# Patient Record
Sex: FEMALE | Race: BLACK OR AFRICAN AMERICAN | NOT HISPANIC OR LATINO | Employment: OTHER | URBAN - NONMETROPOLITAN AREA
[De-identification: names, ages, dates, MRNs, and addresses within clinical notes are randomized per-mention and may not be internally consistent; named-entity substitution may affect disease eponyms.]

---

## 2017-01-04 PROBLEM — R12 HEART BURN: Status: ACTIVE | Noted: 2017-01-04

## 2017-01-04 PROBLEM — J44.9 CHRONIC OBSTRUCTIVE PULMONARY DISEASE (HCC): Status: ACTIVE | Noted: 2017-01-04

## 2017-01-04 PROBLEM — R06.2 WHEEZING: Status: ACTIVE | Noted: 2017-01-04

## 2017-01-12 ENCOUNTER — OFFICE VISIT (OUTPATIENT)
Dept: GASTROENTEROLOGY | Facility: CLINIC | Age: 57
End: 2017-01-12

## 2017-01-12 VITALS
DIASTOLIC BLOOD PRESSURE: 82 MMHG | HEART RATE: 61 BPM | WEIGHT: 118.5 LBS | SYSTOLIC BLOOD PRESSURE: 124 MMHG | HEIGHT: 65 IN | BODY MASS INDEX: 19.74 KG/M2

## 2017-01-12 DIAGNOSIS — R13.10 DYSPHAGIA, UNSPECIFIED TYPE: ICD-10-CM

## 2017-01-12 DIAGNOSIS — K22.2 STRICTURE ESOPHAGUS: Primary | ICD-10-CM

## 2017-01-12 PROCEDURE — 99213 OFFICE O/P EST LOW 20 MIN: CPT | Performed by: INTERNAL MEDICINE

## 2017-01-12 NOTE — MR AVS SNAPSHOT
Maddison Sheridan   1/12/2017 9:15 AM   Office Visit    Dept Phone:  [phone]   Encounter #:  [id number]    Provider:  Babar Connell MD   Department:  [facility] GASTROENTEROLOGY                Your Full Care Plan              Your Updated Medication List          This list is accurate as of: 1/12/17 10:27 AM.  Always use your most recent med list.                albuterol 108 (90 BASE) MCG/ACT inhaler   Commonly known as:  PROVENTIL HFA;VENTOLIN HFA   Inhale 2 puffs Every 4 (Four) Hours As Needed for wheezing.       BOOST liquid   Take 1 can by mouth 4 (four) times a day.       buPROPion  MG 24 hr tablet   Commonly known as:  WELLBUTRIN XL   Take 1 tablet by mouth Daily.       docusate sodium 100 MG capsule   Commonly known as:  COLACE   Take 1 capsule by mouth 2 (Two) Times a Day.       HYDROcodone-acetaminophen  MG per tablet   Commonly known as:  NORCO   Take 1 tablet by mouth 2 (Two) Times a Day As Needed for moderate pain (4-6).       hydrOXYzine 25 MG capsule   Commonly known as:  VISTARIL       loperamide 2 MG capsule   Commonly known as:  IMODIUM   Take 1 capsule by mouth 3 (three) times a day.       metoclopramide 10 MG tablet   Commonly known as:  REGLAN   Take 1 tablet by mouth 4 (Four) Times a Day.       NEXIUM 24HR 20 MG capsule   Generic drug:  esomeprazole   Take 1 capsule by mouth Daily.       ondansetron ODT 4 MG disintegrating tablet   Commonly known as:  ZOFRAN-ODT   Take 1 tablet by mouth Every 6 (Six) Hours As Needed for nausea or vomiting.       sucralfate 1 GM/10ML suspension   Commonly known as:  CARAFATE   Take 10 mL by mouth 4 (Four) Times a Day.               We Performed the Following     Case request       You Were Diagnosed With        Codes Comments    Stricture esophagus    -  Primary ICD-10-CM: K22.2  ICD-9-CM: 530.3     Dysphagia, unspecified type     ICD-10-CM: R13.10  ICD-9-CM: 787.20       Instructions     None       Patient Instructions History      Upcoming Appointments     Visit Type Date Time Department    OFFICE VISIT 2017  9:15 AM [facility] GASTROENT  [facility]    OFFICE VISIT 2017  3:30 PM [facility] FM RESIDENT [facility]    NEW PATIENT 2017 10:00 AM [facility] PAIN MNGT [facility]    OFFICE VISIT 3/2/2017 10:00 AM [facility] GASTROENT  [facility]    [facility]    LAB 2017  2:00 PM  [facility] OP INFUSION      MyChart Signup     [facility] allows you to send messages to your doctor, view your test results, renew your prescriptions, schedule appointments, and more. To sign up, go to [employer] and click on the Sign Up Now link in the New User? box. Enter your [employer] Activation Code exactly as it appears below along with the last four digits of your Social Security Number and your Date of Birth () to complete the sign-up process. If you do not sign up before the expiration date, you must request a new code.    Shoeb[employer] Activation Code: [email or web address]  Expires: 2017 10:26 AM    If you have questions, you can email [email or web address] or call [phone] to talk to our [employer] staff. Remember, [employer] is NOT to be used for urgent needs. For medical emergencies, dial 911.               Other Info from Your Visit           Your Appointments     2017  3:30 PM CST   Office Visit with Tiffany Jimenez MD   [facility] FAMILY MEDICINE (--)    [address] [place] [address] [phone]           Arrive 15 minutes prior to appointment.            2017 10:00 AM CST   New Patient with Luiz Baxter MD   [facility] PAIN MANAGEMENT (--)    [address]  [address] [place] [address] [phone]           Bring all previous medical records and films, along with current medications and insurance information.            Mar 02, 2017 10:00 AM CST   Office Visit with Babar Connell MD   [facility]  "[facility] GASTROENTEROLOGY (--)    [address] [place] [address] [address]  [address]  [facility] [address] [phone]           Arrive 15 minutes prior to appointment.            Apr 13, 2017  2:00 PM CDT   LAB with NURSE TANMAY ALEXANDER   [facility] OP INFUSION ([place])    [address] [place] [id number] [phone]            Apr 13, 2017  2:30 PM CDT   Follow Up with Camilo Coleman MD   [facility] HEMATOLOGY AND ONCOLOGY ([place])    [address] [place] [address] [phone]           Arrive 15 minutes prior to appointment.              Other Notes About Your Plan     Risk Score 4        Allergies     Penicillins  Hives      Reason for Visit     Nausea 4 week follow up    Vomiting           Vital Signs     Blood Pressure Pulse Height Weight Last Menstrual Period Body Mass Index    124/82 (BP Location: Left arm, Patient Position: Sitting, Cuff Size: Adult) 61 65\" (165.1 cm) 118 lb 8 oz (53.8 kg) 03/17/1989 (Within Months) 19.72 kg/m2    Smoking Status                   Former Smoker           Problems and Diagnoses Noted     Difficulty swallowing    Narrowing of esophagus        "

## 2017-01-12 NOTE — LETTER
January 12, 2017     Moses Howard MD  [facility] [address] [place] [address]    Patient: Maddison Sheridan   YOB: 1960   Date of Visit: 1/12/2017       Dear Dr. Anita MD:    Thank you for referring Maddison Sheridan to me for evaluation. Below is a copy of my consult note.    If you have questions, please do not hesitate to call me. I look forward to following Maddison along with you.         Sincerely,        Babar Connell MD        CC: MD Paul Johnson MD Farhan Ahmed, MD Lilly Maryam Yusufi, MD Benjamin Jacob Jarrett, MD    [facility]      Chief Complaint:   Chief Complaint   Patient presents with   • Nausea     4 week follow up   • Vomiting       Subjective     HPI:   Patient with history of esophageal cancer squamous cell.  Patient had chemotherapy and radiation.  Patient has been having dysphagia and has stricturing of the distal esophagus causing difficulty with eating.    Plan; we'll schedule patient for EGD with dilatation.    Past Medical History:   Past Medical History   Diagnosis Date   • Abnormal weight loss    • Atrophic vaginitis    • Degeneration of lumbar intervertebral disc    • Dysphagia      pharyngoesophageal phase      • Encounter for surgical aftercare following surgery of digestive system      [place] Michael (thoracic esophagectomy, lymphadenectomly, Bronchoscopy, Placement on Q. 3/1/16      • Essential hypertension    • History of esophagogastroduodenoscopy      Normal hypopharynx.A tumor was found in the middle third of the esophagus.Mul.biopsies taken.Normal GE junction.Mild nonerosive gastritis in antrum.Biopsies taken.Normal pylorus and duodenum. 09/24/2015       • History of hysteroscopy      Hysteroscopy, dilation and curettage, and repair of vaginal laceration. 08/03/2013       • History of substance abuse    • Lumbosacral strain    • Myopia    • Presbyopia    • Primary malignant neoplasm of thoracic part of  esophagus      Squamous cell   • Tobacco dependence      1 ppd       Family History:  Family History   Problem Relation Age of Onset   • Ovarian cancer Mother    • Alzheimer's disease Father    • Osteoporosis Other    • Heart disease Other    • Diabetes Other    • Cancer Other      Colorectal-Parent   • Asthma Other        Social History:   reports that she has quit smoking. She does not have any smokeless tobacco history on file. She reports that she does not drink alcohol.    Medications:   Current Outpatient Prescriptions   Medication Sig Dispense Refill   • albuterol (PROVENTIL HFA;VENTOLIN HFA) 108 (90 BASE) MCG/ACT inhaler Inhale 2 puffs Every 4 (Four) Hours As Needed for wheezing. 3.7 g 1   • buPROPion XL (WELLBUTRIN XL) 150 MG 24 hr tablet Take 1 tablet by mouth Daily. 30 tablet 5   • docusate sodium (COLACE) 100 MG capsule Take 1 capsule by mouth 2 (Two) Times a Day. 60 capsule 3   • HYDROcodone-acetaminophen (NORCO)  MG per tablet Take 1 tablet by mouth 2 (Two) Times a Day As Needed for moderate pain (4-6). 60 tablet 0   • hydrOXYzine (VISTARIL) 25 MG capsule Take 25 mg by mouth Daily.     • loperamide (IMODIUM) 2 MG capsule Take 1 capsule by mouth 3 (three) times a day. 180 capsule 1   • metoclopramide (REGLAN) 10 MG tablet Take 1 tablet by mouth 4 (Four) Times a Day. 336 tablet 0   • NEXIUM 24HR 20 MG capsule Take 1 capsule by mouth Daily. 30 capsule 5   • Nutritional Supplements (BOOST) liquid Take 1 can by mouth 4 (four) times a day. 30 can 1   • ondansetron ODT (ZOFRAN-ODT) 4 MG disintegrating tablet Take 1 tablet by mouth Every 6 (Six) Hours As Needed for nausea or vomiting. 60 tablet 1   • sucralfate (CARAFATE) 1 GM/10ML suspension Take 10 mL by mouth 4 (Four) Times a Day. 4 g 5     No current facility-administered medications for this visit.        Allergies:  Penicillins    ROS:    Review of Systems   HENT: Positive for trouble swallowing. Negative for congestion and sore throat.   "  Respiratory: Negative for apnea, cough, choking, chest tightness, shortness of breath, wheezing and stridor.    Cardiovascular: Negative for chest pain, palpitations and leg swelling.   Gastrointestinal: Negative for abdominal distention, abdominal pain, anal bleeding, blood in stool, constipation, diarrhea, nausea, rectal pain and vomiting.   Skin: Negative for color change, pallor, rash and wound.   Neurological: Negative for dizziness, syncope, weakness and headaches.   Psychiatric/Behavioral: Negative for agitation, behavioral problems, confusion and decreased concentration.     Objective     Blood pressure 124/82, pulse 61, height 65\" (165.1 cm), weight 118 lb 8 oz (53.8 kg), last menstrual period 03/17/1989, not currently breastfeeding.    Physical Exam   Constitutional: She is oriented to person, place, and time. She appears well-developed and well-nourished. No distress.   HENT:   Head: Normocephalic and atraumatic.   Cardiovascular: Normal rate, regular rhythm, normal heart sounds and intact distal pulses.  Exam reveals no gallop and no friction rub.    No murmur heard.  Pulmonary/Chest: Breath sounds normal. No respiratory distress. She has no wheezes. She has no rales. She exhibits no tenderness.   Abdominal: Soft. Bowel sounds are normal. She exhibits no distension and no mass. There is no tenderness. There is no rebound and no guarding. No hernia.   Musculoskeletal: Normal range of motion. She exhibits no edema.   Neurological: She is alert and oriented to person, place, and time.   Skin: Skin is warm and dry. No rash noted. She is not diaphoretic. No erythema. No pallor.   Psychiatric: She has a normal mood and affect. Her behavior is normal. Judgment and thought content normal.        Assessment/Plan   Maddison was seen today for nausea and vomiting.    Diagnoses and all orders for this visit:    Stricture esophagus  -     Case request    Dysphagia, unspecified type  -     Case " request        ESOPHAGOGASTRODUODENOSCOPY (N/A)     Diagnosis Plan   1. Stricture esophagus  Case request   2. Dysphagia, unspecified type  Case request       Anticipated Surgical Procedure:  No orders of the defined types were placed in this encounter.      The risks, benefits, and alternatives of this procedure have been discussed with the patient or the responsible party- the patient understands and agrees to proceed.

## 2017-01-12 NOTE — PROGRESS NOTES
[facility]      Chief Complaint:   Chief Complaint   Patient presents with   • Nausea     4 week follow up   • Vomiting       Subjective     HPI:   Patient with history of esophageal cancer squamous cell.  Patient had chemotherapy and radiation.  Patient has been having dysphagia and has stricturing of the distal esophagus causing difficulty with eating.    Plan; we'll schedule patient for EGD with dilatation.    Past Medical History:   Past Medical History   Diagnosis Date   • Abnormal weight loss    • Atrophic vaginitis    • Degeneration of lumbar intervertebral disc    • Dysphagia      pharyngoesophageal phase      • Encounter for surgical aftercare following surgery of digestive system      [place] Michael (thoracic esophagectomy, lymphadenectomly, Bronchoscopy, Placement on Q. 3/1/16      • Essential hypertension    • History of esophagogastroduodenoscopy      Normal hypopharynx.A tumor was found in the middle third of the esophagus.Mul.biopsies taken.Normal GE junction.Mild nonerosive gastritis in antrum.Biopsies taken.Normal pylorus and duodenum. 09/24/2015       • History of hysteroscopy      Hysteroscopy, dilation and curettage, and repair of vaginal laceration. 08/03/2013       • History of substance abuse    • Lumbosacral strain    • Myopia    • Presbyopia    • Primary malignant neoplasm of thoracic part of esophagus      Squamous cell   • Tobacco dependence      1 ppd       Family History:  Family History   Problem Relation Age of Onset   • Ovarian cancer Mother    • Alzheimer's disease Father    • Osteoporosis Other    • Heart disease Other    • Diabetes Other    • Cancer Other      Colorectal-Parent   • Asthma Other        Social History:   reports that she has quit smoking. She does not have any smokeless tobacco history on file. She reports that she does not drink alcohol.    Medications:   Current Outpatient Prescriptions   Medication Sig Dispense Refill   • albuterol (PROVENTIL  HFA;VENTOLIN HFA) 108 (90 BASE) MCG/ACT inhaler Inhale 2 puffs Every 4 (Four) Hours As Needed for wheezing. 3.7 g 1   • buPROPion XL (WELLBUTRIN XL) 150 MG 24 hr tablet Take 1 tablet by mouth Daily. 30 tablet 5   • docusate sodium (COLACE) 100 MG capsule Take 1 capsule by mouth 2 (Two) Times a Day. 60 capsule 3   • HYDROcodone-acetaminophen (NORCO)  MG per tablet Take 1 tablet by mouth 2 (Two) Times a Day As Needed for moderate pain (4-6). 60 tablet 0   • hydrOXYzine (VISTARIL) 25 MG capsule Take 25 mg by mouth Daily.     • loperamide (IMODIUM) 2 MG capsule Take 1 capsule by mouth 3 (three) times a day. 180 capsule 1   • metoclopramide (REGLAN) 10 MG tablet Take 1 tablet by mouth 4 (Four) Times a Day. 336 tablet 0   • NEXIUM 24HR 20 MG capsule Take 1 capsule by mouth Daily. 30 capsule 5   • Nutritional Supplements (BOOST) liquid Take 1 can by mouth 4 (four) times a day. 30 can 1   • ondansetron ODT (ZOFRAN-ODT) 4 MG disintegrating tablet Take 1 tablet by mouth Every 6 (Six) Hours As Needed for nausea or vomiting. 60 tablet 1   • sucralfate (CARAFATE) 1 GM/10ML suspension Take 10 mL by mouth 4 (Four) Times a Day. 4 g 5     No current facility-administered medications for this visit.        Allergies:  Penicillins    ROS:    Review of Systems   HENT: Positive for trouble swallowing. Negative for congestion and sore throat.    Respiratory: Negative for apnea, cough, choking, chest tightness, shortness of breath, wheezing and stridor.    Cardiovascular: Negative for chest pain, palpitations and leg swelling.   Gastrointestinal: Negative for abdominal distention, abdominal pain, anal bleeding, blood in stool, constipation, diarrhea, nausea, rectal pain and vomiting.   Skin: Negative for color change, pallor, rash and wound.   Neurological: Negative for dizziness, syncope, weakness and headaches.   Psychiatric/Behavioral: Negative for agitation, behavioral problems, confusion and decreased concentration.  "    Objective     Blood pressure 124/82, pulse 61, height 65\" (165.1 cm), weight 118 lb 8 oz (53.8 kg), last menstrual period 03/17/1989, not currently breastfeeding.    Physical Exam   Constitutional: She is oriented to person, place, and time. She appears well-developed and well-nourished. No distress.   HENT:   Head: Normocephalic and atraumatic.   Cardiovascular: Normal rate, regular rhythm, normal heart sounds and intact distal pulses.  Exam reveals no gallop and no friction rub.    No murmur heard.  Pulmonary/Chest: Breath sounds normal. No respiratory distress. She has no wheezes. She has no rales. She exhibits no tenderness.   Abdominal: Soft. Bowel sounds are normal. She exhibits no distension and no mass. There is no tenderness. There is no rebound and no guarding. No hernia.   Musculoskeletal: Normal range of motion. She exhibits no edema.   Neurological: She is alert and oriented to person, place, and time.   Skin: Skin is warm and dry. No rash noted. She is not diaphoretic. No erythema. No pallor.   Psychiatric: She has a normal mood and affect. Her behavior is normal. Judgment and thought content normal.        Assessment/Plan   Maddison was seen today for nausea and vomiting.    Diagnoses and all orders for this visit:    Stricture esophagus  -     Case request    Dysphagia, unspecified type  -     Case request        ESOPHAGOGASTRODUODENOSCOPY (N/A)     Diagnosis Plan   1. Stricture esophagus  Case request   2. Dysphagia, unspecified type  Case request       Anticipated Surgical Procedure:  No orders of the defined types were placed in this encounter.      The risks, benefits, and alternatives of this procedure have been discussed with the patient or the responsible party- the patient understands and agrees to proceed.                                                                "

## 2017-01-12 NOTE — LETTER
January 12, 2017     Moses Howard MD  [facility] [address] [place] [address]    Patient: Maddison Sheridan   YOB: 1960   Date of Visit: 1/12/2017       Dear Dr. Anita MD:    Thank you for referring Maddison Sheridan to me for evaluation. Below are the relevant portions of my assessment and plan of care.      Maddison was seen today for nausea and vomiting.    Diagnoses and all orders for this visit:    Stricture esophagus  -     Case request    Dysphagia, unspecified type  -     Case request        ESOPHAGOGASTRODUODENOSCOPY (N/A)     Diagnosis Plan   1. Stricture esophagus  Case request   2. Dysphagia, unspecified type  Case request       Anticipated Surgical Procedure:  No orders of the defined types were placed in this encounter.      The risks, benefits, and alternatives of this procedure have been discussed with the patient or the responsible party- the patient understands and agrees to proceed.                                                                              If you have questions, please do not hesitate to call me. I look forward to following Maddison along with you.         Sincerely,        Babar Connell MD        CC: No Recipients

## 2017-01-20 ENCOUNTER — OFFICE VISIT (OUTPATIENT)
Dept: FAMILY MEDICINE CLINIC | Facility: CLINIC | Age: 57
End: 2017-01-20

## 2017-01-20 VITALS
DIASTOLIC BLOOD PRESSURE: 80 MMHG | HEIGHT: 65 IN | OXYGEN SATURATION: 98 % | HEART RATE: 70 BPM | WEIGHT: 114.2 LBS | BODY MASS INDEX: 19.03 KG/M2 | SYSTOLIC BLOOD PRESSURE: 138 MMHG

## 2017-01-20 DIAGNOSIS — G89.28 OTHER CHRONIC POSTPROCEDURAL PAIN: ICD-10-CM

## 2017-01-20 PROCEDURE — 99213 OFFICE O/P EST LOW 20 MIN: CPT | Performed by: FAMILY MEDICINE

## 2017-01-20 RX ORDER — HYDROCODONE BITARTRATE AND ACETAMINOPHEN 10; 325 MG/1; MG/1
1 TABLET ORAL 3 TIMES DAILY PRN
Qty: 90 TABLET | Refills: 0 | Status: SHIPPED | OUTPATIENT
Start: 2017-01-20 | End: 2017-02-17 | Stop reason: SDUPTHER

## 2017-01-20 NOTE — PROGRESS NOTES
Subjective:     Maddison Sheridan is a 56 y.o. female who presents for Chronic pain secondary from esophageal cancer s/p surgery. Stomach has been moved up to accommodate shortened esophagus.   Patient is to see Dr. Connell soon for esophageal stretching.  She has no side effects of medication at this time.  Patient does state that she uses one in the morning and 1 at night however pain returns approximately in early afternoon and continues until her nighttime dose.  Her nighttime dose has helped her to sleep.  She was requesting her Norco be increased to 3 times a day    History:   Duration of the patient's pain: a few year(s)   Legal issues? no   Current controlled medication and doses: Norco 10/325 BID   Medications that have failed: Vistaril, Benadryl, Ibuprofen, Tylenol   Side effects of current medications: constipation   Satisfaction with treatment: yes    Functional Status:   Able to feed self: yes   Able to bathe self: yes   Able to use the toilet independently: yes   Able to dress self: yes   Able to get up from bed or chair without assistance: yes    Adverse Events:   Constipation: yes   Lethargy: yes   Hypogonadism: no    Aberrant Behavior:   Over dose: no   Run out of medications early: no   Last UDS consistent: yes    Analgesia:   Pain scale prior to meds: 9    Pain scale after meds started: 6   Taking medications as prescribed: yes     The following portions of the patient's history were reviewed and updated as appropriate: allergies, current medications, past family history, past medical history, past social history, past surgical history and problem list.    Preventative:  Over the past 2 weeks, have you felt down, depressed, or hopeless?Yes   Over the past 2 weeks, have you felt little interest or pleasure in doing things?Yes  Clinical depression screening refused by patient.Yes     On osteoporosis therapy?No     Past Medical Hx:  Past Medical History   Diagnosis Date   • Abnormal weight  loss    • Atrophic vaginitis    • Degeneration of lumbar intervertebral disc    • Dysphagia      pharyngoesophageal phase      • Encounter for surgical aftercare following surgery of digestive system      Wal[place] Michael (thoracic esophagectomy, lymphadenectomly, Bronchoscopy, Placement on Q. 3/1/16      • Essential hypertension    • History of esophagogastroduodenoscopy      Normal hypopharynx.A tumor was found in the middle third of the esophagus.Mul.biopsies taken.Normal GE junction.Mild nonerosive gastritis in antrum.Biopsies taken.Normal pylorus and duodenum. 09/24/2015       • History of hysteroscopy      Hysteroscopy, dilation and curettage, and repair of vaginal laceration. 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 Esophageal carcinoma.   • Lumbar disc surgery     • Esophagus surgery       Thoracic esophagectomy (Walter Michael type).Thoracic lymphadenectomy.Fiberoptic bronchoscopy.Placement of On Q pain pump.Jejunostomy tube placement.Pyloromyotomy. 03/01/2016          Health Maintenance:  Health Maintenance   Topic Date Due   • HEPATITIS C SCREENING  08/24/2016   • MAMMOGRAM  08/24/2018   • PAP SMEAR  12/23/2019   • TDAP/TD VACCINES (2 - Td) 01/01/2023   • COLONOSCOPY  08/24/2026   • INFLUENZA VACCINE  Addressed       Current Meds:    Current Outpatient Prescriptions:   •  albuterol (PROVENTIL HFA;VENTOLIN HFA) 108 (90 BASE) MCG/ACT inhaler, Inhale 2 puffs Every 4 (Four) Hours As Needed for wheezing., Disp: 3.7 g, Rfl: 1  •  buPROPion XL (WELLBUTRIN XL) 150 MG 24 hr tablet, Take 1 tablet by mouth Daily., Disp: 30 tablet, Rfl: 5  •  docusate sodium  (COLACE) 100 MG capsule, Take 1 capsule by mouth 2 (Two) Times a Day., Disp: 60 capsule, Rfl: 3  •  HYDROcodone-acetaminophen (NORCO)  MG per tablet, Take 1 tablet by mouth 2 (Two) Times a Day As Needed for moderate pain (4-6)., Disp: 60 tablet, Rfl: 0  •  hydrOXYzine (VISTARIL) 25 MG capsule, Take 25 mg by mouth Daily., Disp: , Rfl:   •  loperamide (IMODIUM) 2 MG capsule, Take 1 capsule by mouth 3 (three) times a day., Disp: 180 capsule, Rfl: 1  •  metoclopramide (REGLAN) 10 MG tablet, Take 1 tablet by mouth 4 (Four) Times a Day., Disp: 336 tablet, Rfl: 0  •  NEXIUM 24HR 20 MG capsule, Take 1 capsule by mouth Daily., Disp: 30 capsule, Rfl: 5  •  Nutritional Supplements (BOOST) liquid, Take 1 can by mouth 4 (four) times a day., Disp: 30 can, Rfl: 1  •  ondansetron ODT (ZOFRAN-ODT) 4 MG disintegrating tablet, Take 1 tablet by mouth Every 6 (Six) Hours As Needed for nausea or vomiting., Disp: 60 tablet, Rfl: 1  •  sucralfate (CARAFATE) 1 GM/10ML suspension, Take 10 mL by mouth 4 (Four) Times a Day., Disp: 4 g, Rfl: 5    Allergies:  Penicillins    Family Hx:  Family History   Problem Relation Age of Onset   • Ovarian cancer Mother    • Alzheimer's disease Father    • Osteoporosis Other    • Heart disease Other    • Diabetes Other    • Cancer Other      Colorectal-Parent   • Asthma Other         Social History:  Social History     Social History   • Marital status: Single     Spouse name: N/A   • Number of children: N/A   • Years of education: N/A     Occupational History   • Not on file.     Social History Main Topics   • Smoking status: Former Smoker   • Smokeless tobacco: Not on file   • Alcohol use No   • Drug use: Not on file   • Sexual activity: Not on file     Other Topics Concern   • Not on file     Social History Narrative       Review of Systems  Review of Systems   Constitutional: Negative.  Negative for fatigue and fever.   HENT: Negative.  Negative for ear pain and sore throat.    Eyes: Negative.   "Negative for pain and visual disturbance.   Respiratory: Negative for cough and shortness of breath.    Cardiovascular: Negative.  Negative for chest pain and palpitations.   Gastrointestinal: Negative for abdominal pain and nausea.   Endocrine: Negative.    Genitourinary: Negative for dysuria.   Musculoskeletal: Negative for arthralgias.        Pain secondary to esophageal cancer. Difficulty sleeping due to Gerd.    Skin: Negative for rash.   Allergic/Immunologic: Negative.    Neurological: Negative for dizziness, weakness and headaches.   Psychiatric/Behavioral: Negative for sleep disturbance.       Objective:     Visit Vitals   • /80 (BP Location: Left arm, Patient Position: Sitting, Cuff Size: Adult)   • Pulse 70   • Ht 65\" (165.1 cm)   • Wt 114 lb 3.2 oz (51.8 kg)   • LMP 03/17/1989 (Within Months)   • SpO2 98%   • BMI 19 kg/m2     Physical exam   General:  alert, appears stated age, cooperative and no distress   Oropharynx: lips, mucosa, and tongue normal; teeth and gums normal    Eyes:  conjunctivae/corneas clear. PERRL, EOM's intact. Fundi benign.    Ears:  normal TM's and external ear canals both ears   Neck: no adenopathy, no carotid bruit, no JVD, supple, symmetrical, trachea midline and thyroid not enlarged, symmetric, no tenderness/mass/nodules   Thyroid:  no palpable nodule   Lung: clear to auscultation bilaterally   Heart:  regular rate and rhythm, S1, S2 normal, no murmur, click, rub or gallop   Abdomen: soft, non-tender; bowel sounds normal; no masses,  no organomegaly   Extremities: extremities normal, atraumatic, no cyanosis or edema   Skin: warm and dry, no hyperpigmentation, vitiligo, or suspicious lesions   Pulses: 2+ and symmetric   Neuro: normal without focal findings, mental status, speech normal, alert and oriented x3 and reflexes normal and symmetric       Lab Review  none     Assessment:     1. Other chronic postprocedural pain         Plan:     As part of this patient's treatment " plan, I am prescribing controlled substances. The patient has been made aware of appropriate use of such medications, including potential risk of somnolence, limited ability to drive and /or work safely, and potential for dependence or overdose. It has also been made clear that these medications are for use by this patient only, without concomitant use of alcohol or other substances unless prescribed.    Patient has completed prescribing agreement detailing terms of continued prescribing of controlled substances, including monitoring ANNETTE reports, urine drug screening, and pill counts if necessary. The patient is aware that inappropriate use will result in cessation of prescribing such medications.    History and physical exam exhibit continued safe and appropriate use of controlled substances.    1.  Controlled substance abuse agreement discussed and copy in record: yes, prior  2.  Discussed:   Risk of addiction: yes   Specific risk of medications: yes   Side effects of medications: yes   Reasonable expectations of the medication: yes  3.  Treatment Objectives:   Discussed management of constipation: yes   Discussed management of other side effects: yes   4.  eKASPER:     ANNETTE report has been reviewed by: Tiffany Jimenez MD on 01/20/17.  The report was scanned into the patient's chart.   Date of last ANNETTE:  1/20/2017 appropriate   Annette # [id number]   UDS Today again  5.  Results and date of last drug screen:appropriate  6.  Follow up plan:    1 month   Recheck if not improving: yes   Continue on current medications as directed: yes   7.  Adjustments:   We'll increase medication 3 times a day      RISK SCORE: 4          This document has been electronically signed by Tiffany Jimenez MD on January 25, 2017 9:57 AM

## 2017-01-20 NOTE — MR AVS SNAPSHOT
Maddison Sheridan   1/20/2017 3:30 PM   Office Visit    Dept Phone:  [phone]   Encounter #:  [id number]    Provider:  Tiffany Jimenez MD   Department:  [facility]                Your Full Care Plan              Today's Medication Changes          These changes are accurate as of: 1/20/17  4:31 PM.  If you have any questions, ask your nurse or doctor.               Medication(s)that have changed:     HYDROcodone-acetaminophen  MG per tablet   Commonly known as:  NORCO   Take 1 tablet by mouth 3 (Three) Times a Day As Needed for moderate pain (4-6).   What changed:  when to take this   Changed by:  Tiffany Jimenez MD            Where to Get Your Medications      You can get these medications from any pharmacy     Bring a paper prescription for each of these medications     HYDROcodone-acetaminophen  MG per tablet                  Your Updated Medication List          This list is accurate as of: 1/20/17  4:31 PM.  Always use your most recent med list.                albuterol 108 (90 BASE) MCG/ACT inhaler   Commonly known as:  PROVENTIL HFA;VENTOLIN HFA   Inhale 2 puffs Every 4 (Four) Hours As Needed for wheezing.       BOOST liquid   Take 1 can by mouth 4 (four) times a day.       buPROPion  MG 24 hr tablet   Commonly known as:  WELLBUTRIN XL   Take 1 tablet by mouth Daily.       docusate sodium 100 MG capsule   Commonly known as:  COLACE   Take 1 capsule by mouth 2 (Two) Times a Day.       HYDROcodone-acetaminophen  MG per tablet   Commonly known as:  NORCO   Take 1 tablet by mouth 3 (Three) Times a Day As Needed for moderate pain (4-6).       hydrOXYzine 25 MG capsule   Commonly known as:  VISTARIL       loperamide 2 MG capsule   Commonly known as:  IMODIUM   Take 1 capsule by mouth 3 (three) times a day.       metoclopramide 10 MG tablet   Commonly known as:  REGLAN   Take 1 tablet by mouth 4 (Four) Times a Day.       NEXIUM 24HR 20 MG capsule   Generic drug:  esomeprazole   Take 1 capsule by mouth Daily.       ondansetron ODT 4 MG disintegrating tablet   Commonly known as:  ZOFRAN-ODT   Take 1 tablet by mouth Every 6 (Six) Hours As Needed for nausea or vomiting.       sucralfate 1 GM/10ML suspension   Commonly known as:  CARAFATE   Take 10 mL by mouth 4 (Four) Times a Day.               We Performed the Following     Pain Management Profile (13 Drugs) Urine       You Were Diagnosed With        Codes Comments    Other chronic postprocedural pain     ICD-10-CM: G89.28  ICD-9-CM: 338.28       Instructions     None    Patient Instructions History      Upcoming Appointments     Visit Type Date Time Department    OFFICE VISIT 2017  3:30 PM W  RESIDENT [facility]    NEW PATIENT 2017 10:00 AM [facility] PAIN MNGT [facility]    OFFICE VISIT 2017  3:45 PM [facility] FM RESIDENT [facility]    OFFICE VISIT 3/2/2017 10:00 AM [facility] GASTROENT  [facility]    FOLLOW UP 2017  2:30 PM [facility] ONC [place]    LAB 2017  2:00 PM [facility] OP INFUSION      MyChart Signup     [facility] [employer] allows you to send messages to your doctor, view your test results, renew your prescriptions, schedule appointments, and more. To sign up, go to [employer] and click on the Sign Up Now link in the New User? box. Enter your [employer] Activation Code exactly as it appears below along with the last four digits of your Social Security Number and your Date of Birth () to complete the sign-up process. If you do not sign up before the expiration date, you must request a new code.    [employer] Activation Code: [email or web address]  Expires: 2017 10:26 AM    If you have questions, you can email [email or web address] or call [phone] to talk to our [employer] staff. Remember, [employer] is NOT to be used for urgent needs. For medical emergencies, dial 911.               Other Info from Your Visit           Your Appointments     2017 10:00 AM CST   New  "Patient with Luiz Baxter MD   [facility] PAIN MANAGEMENT (--)    [address]  [place] [address] [facility] [address] [phone]           Bring all previous medical records and films, along with current medications and insurance information.            Feb 17, 2017  3:45 PM CST   Office Visit with Tiffany Jimenez MD   [facility] FAMILY MEDICINE (--)    [address]   [place] [address] [phone]           Arrive 15 minutes prior to appointment.            Mar 02, 2017 10:00 AM CST   Office Visit with Babar Connell MD   [facility] GASTROENTEROLOGY (--)    [address]  [address] [place] [address] [facility] [address] [phone]           Arrive 15 minutes prior to appointment.            Apr 13, 2017  2:00 PM CDT   LAB with NURSE TANMAY ALEXANDER   [facility] OP INFUSION ([place])    [address] [address] [phone]            Apr 13, 2017  2:30 PM CDT   Follow Up with Camilo Coleman MD   [facility] HEMATOLOGY AND ONCOLOGY ([address] [address] [phone]           Arrive 15 minutes prior to appointment.              Other Notes About Your Plan     Risk Score 4        Allergies     Penicillins  Hives      Reason for Visit     Pain     Follow-up           Vital Signs     Blood Pressure Pulse Height Weight    138/80 (BP Location: Left arm, Patient Position: Sitting, Cuff Size: Adult) 70 65\" (165.1 cm) 114 lb 3.2 oz (51.8 kg)    Last Menstrual Period Oxygen Saturation Body Mass Index Smoking Status    03/17/1989 (Within Months) 98% 19 kg/m2 Former Smoker      Problems and Diagnoses Noted     Chronic pain        "

## 2017-02-03 ENCOUNTER — HOSPITAL ENCOUNTER (OUTPATIENT)
Facility: HOSPITAL | Age: 57
Setting detail: HOSPITAL OUTPATIENT SURGERY
Discharge: HOME | End: 2017-02-03
Attending: INTERNAL MEDICINE | Admitting: INTERNAL MEDICINE

## 2017-02-03 ENCOUNTER — ANESTHESIA EVENT (OUTPATIENT)
Dept: GASTROENTEROLOGY | Facility: HOSPITAL | Age: 57
End: 2017-02-03

## 2017-02-03 ENCOUNTER — ANESTHESIA (OUTPATIENT)
Dept: GASTROENTEROLOGY | Facility: HOSPITAL | Age: 57
End: 2017-02-03

## 2017-02-03 VITALS
SYSTOLIC BLOOD PRESSURE: 137 MMHG | WEIGHT: 114.2 LBS | RESPIRATION RATE: 18 BRPM | DIASTOLIC BLOOD PRESSURE: 87 MMHG | BODY MASS INDEX: 19.03 KG/M2 | OXYGEN SATURATION: 100 % | HEART RATE: 87 BPM | HEIGHT: 65 IN | TEMPERATURE: 96.9 F

## 2017-02-03 DIAGNOSIS — R13.10 DYSPHAGIA, UNSPECIFIED TYPE: ICD-10-CM

## 2017-02-03 DIAGNOSIS — K22.2 STRICTURE ESOPHAGUS: ICD-10-CM

## 2017-02-03 PROCEDURE — 43239 EGD BIOPSY SINGLE/MULTIPLE: CPT | Performed by: INTERNAL MEDICINE

## 2017-02-03 PROCEDURE — 25010000002 MIDAZOLAM PER 1 MG: Performed by: NURSE ANESTHETIST, CERTIFIED REGISTERED

## 2017-02-03 PROCEDURE — 25010000002 FENTANYL CITRATE (PF) 100 MCG/2ML SOLUTION: Performed by: NURSE ANESTHETIST, CERTIFIED REGISTERED

## 2017-02-03 PROCEDURE — 88342 IMHCHEM/IMCYTCHM 1ST ANTB: CPT | Performed by: PATHOLOGY

## 2017-02-03 PROCEDURE — 88342 IMHCHEM/IMCYTCHM 1ST ANTB: CPT | Performed by: INTERNAL MEDICINE

## 2017-02-03 PROCEDURE — 88305 TISSUE EXAM BY PATHOLOGIST: CPT | Performed by: PATHOLOGY

## 2017-02-03 PROCEDURE — 43248 EGD GUIDE WIRE INSERTION: CPT | Performed by: INTERNAL MEDICINE

## 2017-02-03 PROCEDURE — 88305 TISSUE EXAM BY PATHOLOGIST: CPT | Performed by: INTERNAL MEDICINE

## 2017-02-03 RX ORDER — LIDOCAINE HYDROCHLORIDE 10 MG/ML
INJECTION, SOLUTION INFILTRATION; PERINEURAL AS NEEDED
Status: DISCONTINUED | OUTPATIENT
Start: 2017-02-03 | End: 2017-02-03 | Stop reason: SURG

## 2017-02-03 RX ORDER — PROMETHAZINE HYDROCHLORIDE 25 MG/1
25 TABLET ORAL ONCE AS NEEDED
Status: DISCONTINUED | OUTPATIENT
Start: 2017-02-03 | End: 2017-02-03 | Stop reason: HOSPADM

## 2017-02-03 RX ORDER — PROMETHAZINE HYDROCHLORIDE 25 MG/ML
12.5 INJECTION, SOLUTION INTRAMUSCULAR; INTRAVENOUS ONCE AS NEEDED
Status: DISCONTINUED | OUTPATIENT
Start: 2017-02-03 | End: 2017-02-03 | Stop reason: HOSPADM

## 2017-02-03 RX ORDER — DEXTROSE AND SODIUM CHLORIDE 5; .45 G/100ML; G/100ML
20 INJECTION, SOLUTION INTRAVENOUS CONTINUOUS
Status: DISCONTINUED | OUTPATIENT
Start: 2017-02-03 | End: 2017-02-03 | Stop reason: HOSPADM

## 2017-02-03 RX ORDER — ONDANSETRON 2 MG/ML
4 INJECTION INTRAMUSCULAR; INTRAVENOUS ONCE AS NEEDED
Status: DISCONTINUED | OUTPATIENT
Start: 2017-02-03 | End: 2017-02-03 | Stop reason: HOSPADM

## 2017-02-03 RX ORDER — MIDAZOLAM HYDROCHLORIDE 1 MG/ML
INJECTION INTRAMUSCULAR; INTRAVENOUS AS NEEDED
Status: DISCONTINUED | OUTPATIENT
Start: 2017-02-03 | End: 2017-02-03 | Stop reason: SURG

## 2017-02-03 RX ORDER — SODIUM CHLORIDE, SODIUM GLUCONATE, SODIUM ACETATE, POTASSIUM CHLORIDE, AND MAGNESIUM CHLORIDE 526; 502; 368; 37; 30 MG/100ML; MG/100ML; MG/100ML; MG/100ML; MG/100ML
INJECTION, SOLUTION INTRAVENOUS CONTINUOUS PRN
Status: DISCONTINUED | OUTPATIENT
Start: 2017-02-03 | End: 2017-02-03 | Stop reason: SURG

## 2017-02-03 RX ORDER — PROMETHAZINE HYDROCHLORIDE 25 MG/1
25 SUPPOSITORY RECTAL ONCE AS NEEDED
Status: DISCONTINUED | OUTPATIENT
Start: 2017-02-03 | End: 2017-02-03 | Stop reason: HOSPADM

## 2017-02-03 RX ORDER — FENTANYL CITRATE 50 UG/ML
INJECTION, SOLUTION INTRAMUSCULAR; INTRAVENOUS AS NEEDED
Status: DISCONTINUED | OUTPATIENT
Start: 2017-02-03 | End: 2017-02-03 | Stop reason: SURG

## 2017-02-03 RX ADMIN — MIDAZOLAM 2 MG: 1 INJECTION INTRAMUSCULAR; INTRAVENOUS at 14:22

## 2017-02-03 RX ADMIN — SODIUM CHLORIDE, SODIUM GLUCONATE, SODIUM ACETATE, POTASSIUM CHLORIDE, AND MAGNESIUM CHLORIDE: 526; 502; 368; 37; 30 INJECTION, SOLUTION INTRAVENOUS at 14:00

## 2017-02-03 RX ADMIN — LIDOCAINE HYDROCHLORIDE 100 MG: 10 INJECTION, SOLUTION INFILTRATION; PERINEURAL at 14:22

## 2017-02-03 RX ADMIN — FENTANYL CITRATE 100 MCG: 50 INJECTION, SOLUTION INTRAMUSCULAR; INTRAVENOUS at 14:22

## 2017-02-03 RX ADMIN — DEXTROSE AND SODIUM CHLORIDE 20 ML/HR: 5; 450 INJECTION, SOLUTION INTRAVENOUS at 13:46

## 2017-02-03 NOTE — PLAN OF CARE
Problem: Patient Care Overview (Adult)  Goal: Plan of Care Review    02/03/17 1455   Coping/Psychosocial Response Interventions   Plan Of Care Reviewed With patient   Patient Care Overview   Progress no change         Problem: GI Endoscopy (Adult)  Goal: Signs and Symptoms of Listed Potential Problems Will be Absent or Manageable (GI Endoscopy)    02/03/17 4805   GI Endoscopy   Problems Assessed (GI Endoscopy) all   Problems Present (GI Endoscopy) none

## 2017-02-03 NOTE — ANESTHESIA POSTPROCEDURE EVALUATION
Patient: Maddison Sheridan    Procedure Summary     Date Anesthesia Start Anesthesia Stop Room / Location    02/03/17 1421 1432 [facility] ENDOSCOPY 1 / [facility] ENDOSCOPY       Procedure Diagnosis Surgeon Provider    ESOPHAGOGASTRODUODENOSCOPY (N/A Esophagus) Stricture esophagus; Dysphagia, unspecified type  (Stricture esophagus [K22.2]; Dysphagia, unspecified type [R13.10]) MD Pamela Vásquez CRNA          Anesthesia Type: MAC  Last vitals  BP (!) 186/101 (02/03/17 1337)    Temp 97.6 °F (36.4 °C) (02/03/17 1337)    Pulse 54 (02/03/17 1337)   Resp 18 (02/03/17 1337)    SpO2 100 % (02/03/17 1337)      Post Anesthesia Care and Evaluation    Patient location during evaluation: bedside  Patient participation: complete - patient participated  Level of consciousness: agitated  Pain score: 0  Pain management: adequate  Airway patency: patent  Anesthetic complications: No anesthetic complications  PONV Status: none  Cardiovascular status: acceptable  Respiratory status: acceptable  Hydration status: acceptable

## 2017-02-03 NOTE — H&P (VIEW-ONLY)
[facility]      Chief Complaint:   Chief Complaint   Patient presents with   • Nausea     4 week follow up   • Vomiting       Subjective     HPI:   Patient with history of esophageal cancer squamous cell.  Patient had chemotherapy and radiation.  Patient has been having dysphagia and has stricturing of the distal esophagus causing difficulty with eating.    Plan; we'll schedule patient for EGD with dilatation.    Past Medical History:   Past Medical History   Diagnosis Date   • Abnormal weight loss    • Atrophic vaginitis    • Degeneration of lumbar intervertebral disc    • Dysphagia      pharyngoesophageal phase      • Encounter for surgical aftercare following surgery of digestive system      [place] Michael (thoracic esophagectomy, lymphadenectomly, Bronchoscopy, Placement on Q. 3/1/16      • Essential hypertension    • History of esophagogastroduodenoscopy      Normal hypopharynx.A tumor was found in the middle third of the esophagus.Mul.biopsies taken.Normal GE junction.Mild nonerosive gastritis in antrum.Biopsies taken.Normal pylorus and duodenum. 09/24/2015       • History of hysteroscopy      Hysteroscopy, dilation and curettage, and repair of vaginal laceration. 08/03/2013       • History of substance abuse    • Lumbosacral strain    • Myopia    • Presbyopia    • Primary malignant neoplasm of thoracic part of esophagus      Squamous cell   • Tobacco dependence      1 ppd       Family History:  Family History   Problem Relation Age of Onset   • Ovarian cancer Mother    • Alzheimer's disease Father    • Osteoporosis Other    • Heart disease Other    • Diabetes Other    • Cancer Other      Colorectal-Parent   • Asthma Other        Social History:   reports that she has quit smoking. She does not have any smokeless tobacco history on file. She reports that she does not drink alcohol.    Medications:   Current Outpatient Prescriptions   Medication Sig Dispense Refill   • albuterol (PROVENTIL  HFA;VENTOLIN HFA) 108 (90 BASE) MCG/ACT inhaler Inhale 2 puffs Every 4 (Four) Hours As Needed for wheezing. 3.7 g 1   • buPROPion XL (WELLBUTRIN XL) 150 MG 24 hr tablet Take 1 tablet by mouth Daily. 30 tablet 5   • docusate sodium (COLACE) 100 MG capsule Take 1 capsule by mouth 2 (Two) Times a Day. 60 capsule 3   • HYDROcodone-acetaminophen (NORCO)  MG per tablet Take 1 tablet by mouth 2 (Two) Times a Day As Needed for moderate pain (4-6). 60 tablet 0   • hydrOXYzine (VISTARIL) 25 MG capsule Take 25 mg by mouth Daily.     • loperamide (IMODIUM) 2 MG capsule Take 1 capsule by mouth 3 (three) times a day. 180 capsule 1   • metoclopramide (REGLAN) 10 MG tablet Take 1 tablet by mouth 4 (Four) Times a Day. 336 tablet 0   • NEXIUM 24HR 20 MG capsule Take 1 capsule by mouth Daily. 30 capsule 5   • Nutritional Supplements (BOOST) liquid Take 1 can by mouth 4 (four) times a day. 30 can 1   • ondansetron ODT (ZOFRAN-ODT) 4 MG disintegrating tablet Take 1 tablet by mouth Every 6 (Six) Hours As Needed for nausea or vomiting. 60 tablet 1   • sucralfate (CARAFATE) 1 GM/10ML suspension Take 10 mL by mouth 4 (Four) Times a Day. 4 g 5     No current facility-administered medications for this visit.        Allergies:  Penicillins    ROS:    Review of Systems   HENT: Positive for trouble swallowing. Negative for congestion and sore throat.    Respiratory: Negative for apnea, cough, choking, chest tightness, shortness of breath, wheezing and stridor.    Cardiovascular: Negative for chest pain, palpitations and leg swelling.   Gastrointestinal: Negative for abdominal distention, abdominal pain, anal bleeding, blood in stool, constipation, diarrhea, nausea, rectal pain and vomiting.   Skin: Negative for color change, pallor, rash and wound.   Neurological: Negative for dizziness, syncope, weakness and headaches.   Psychiatric/Behavioral: Negative for agitation, behavioral problems, confusion and decreased concentration.  "    Objective     Blood pressure 124/82, pulse 61, height 65\" (165.1 cm), weight 118 lb 8 oz (53.8 kg), last menstrual period 03/17/1989, not currently breastfeeding.    Physical Exam   Constitutional: She is oriented to person, place, and time. She appears well-developed and well-nourished. No distress.   HENT:   Head: Normocephalic and atraumatic.   Cardiovascular: Normal rate, regular rhythm, normal heart sounds and intact distal pulses.  Exam reveals no gallop and no friction rub.    No murmur heard.  Pulmonary/Chest: Breath sounds normal. No respiratory distress. She has no wheezes. She has no rales. She exhibits no tenderness.   Abdominal: Soft. Bowel sounds are normal. She exhibits no distension and no mass. There is no tenderness. There is no rebound and no guarding. No hernia.   Musculoskeletal: Normal range of motion. She exhibits no edema.   Neurological: She is alert and oriented to person, place, and time.   Skin: Skin is warm and dry. No rash noted. She is not diaphoretic. No erythema. No pallor.   Psychiatric: She has a normal mood and affect. Her behavior is normal. Judgment and thought content normal.        Assessment/Plan   Maddison was seen today for nausea and vomiting.    Diagnoses and all orders for this visit:    Stricture esophagus  -     Case request    Dysphagia, unspecified type  -     Case request        ESOPHAGOGASTRODUODENOSCOPY (N/A)     Diagnosis Plan   1. Stricture esophagus  Case request   2. Dysphagia, unspecified type  Case request       Anticipated Surgical Procedure:  No orders of the defined types were placed in this encounter.      The risks, benefits, and alternatives of this procedure have been discussed with the patient or the responsible party- the patient understands and agrees to proceed.                                                                "

## 2017-02-03 NOTE — DISCHARGE INSTRUCTIONS
Outpatient Instructions for Monitored Anesthesia Care (MAC)    1. You will be released from the hospital in the care of a responsible adult who should remain with you for at least 6 hours.    2. You are at an increased risk for falls following anesthesia. Use care when changing from a lying to a sitting position. Use your assistive devices ( example: cane, walker or family member).    3. You must NOT drive a car, climb high places such as a ladder, or operate equipment such as electric knives,stoves etc...for at least 12 hours. If you are dizzy for longer than 24 hours, notify your doctor.    4. DO NOT drink any alcoholic beverages for at least 24 hours. Anesthesia may impair your judgement.    5. If you smoke, do not smoke alone due to increased risk of burns/fires.    6. DO NOT undertake any legally binding commitment for at least 24 hours. Anesthesia may impair your judgement.

## 2017-02-03 NOTE — PLAN OF CARE
Problem: Patient Care Overview (Adult)  Goal: Plan of Care Review    02/03/17 1433   Coping/Psychosocial Response Interventions   Plan Of Care Reviewed With patient   Patient Care Overview   Progress no change   Outcome Evaluation   Outcome Summary/Follow up Plan vs stable         Problem: GI Endoscopy (Adult)  Goal: Signs and Symptoms of Listed Potential Problems Will be Absent or Manageable (GI Endoscopy)  Outcome: Ongoing (interventions implemented as appropriate)    02/03/17 1433   GI Endoscopy   Problems Assessed (GI Endoscopy) all   Problems Present (GI Endoscopy) none

## 2017-02-03 NOTE — ANESTHESIA PREPROCEDURE EVALUATION
Anesthesia Evaluation      Airway   Mallampati: I  TM distance: <3 FB  Neck ROM: full  possible difficult intubation  Dental      Pulmonary - normal exam   Cardiovascular - normal exam    Neuro/Psych  GI/Hepatic/Renal/Endo      Musculoskeletal     Abdominal    Substance History      OB/GYN          Other                             Anesthesia Plan    ASA 3     MAC     Anesthetic plan and risks discussed with patient.

## 2017-02-06 LAB
LAB AP CASE REPORT: NORMAL
LAB AP DIAGNOSIS COMMENT: NORMAL
Lab: NORMAL
PATH REPORT.FINAL DX SPEC: NORMAL
PATH REPORT.GROSS SPEC: NORMAL

## 2017-02-08 ENCOUNTER — OFFICE VISIT (OUTPATIENT)
Dept: PAIN MEDICINE | Facility: CLINIC | Age: 57
End: 2017-02-08

## 2017-02-08 VITALS
WEIGHT: 113.6 LBS | BODY MASS INDEX: 18.93 KG/M2 | SYSTOLIC BLOOD PRESSURE: 140 MMHG | DIASTOLIC BLOOD PRESSURE: 80 MMHG | HEIGHT: 65 IN

## 2017-02-08 DIAGNOSIS — K22.2 STRICTURE ESOPHAGUS: Primary | ICD-10-CM

## 2017-02-08 DIAGNOSIS — K22.89 PAIN OF ESOPHAGUS: ICD-10-CM

## 2017-02-08 DIAGNOSIS — R13.10 DYSPHAGIA, UNSPECIFIED TYPE: ICD-10-CM

## 2017-02-08 DIAGNOSIS — Z85.01 HISTORY OF ESOPHAGEAL CANCER: ICD-10-CM

## 2017-02-08 PROCEDURE — 99213 OFFICE O/P EST LOW 20 MIN: CPT | Performed by: NURSE PRACTITIONER

## 2017-02-08 RX ORDER — LACTOSE-REDUCED FOOD
1 LIQUID (ML) ORAL AS NEEDED
COMMUNITY
End: 2018-05-10

## 2017-02-08 NOTE — PROGRESS NOTES
"Maddison Sheridna is a 56 y.o. female.   1960    HPI:   Location: right chest below clavicle   Quality: aching  Severity: 10/10  Timing: comes and goes  Alleviating: pain medication take the edge off  Aggravating: nothing        Pt referred to pain management via DR. Dubose related to chronic chest wall pain related to esophogeal cancer and treatments. Chronologically, pt was diagnosed August 2015 with Esophogeal Cancer tumor stage II Dr. Bell. Dr. Carias performed upper GI endoscopy. Pt was referred to [facility] and Dr. Nguyễn. Pt underwent chemotherapy and radiation treatment after tumor removed Dr. Carisa and Ray 2016 march. Pt continued to follow up with Dr. dubose and PCP family physician Tony has been providing Norco 10mg TID. Pt states \"Did see Dr. Howard up until the CA diagnosis and treatment, seen him for my chest wall pain, then changed to PCP in [place]\". Pt has been having trouble swallowing and esophogeal pain, Dr. Hernandez with recent Upper GI scope and results below. Pt states \" my chest is sore on the inside not tender to touch, especially when I eat\". I reviewed Dr. Jimenez's notes and below. Pt with aberrant UDS on 12/23/16 Cocaine and Amp... Pt states \"i have no idea....how that got in there\".... Reviewed 1/20/2016 UDS with \"not collected yet results\".... Pt states 'I dont remember her ordering UDS\".... Pt will follow up with Dr. Jimenez Feb. 17th.  Patient understood to continue primary care, [facility] follow-up, and any ancillary appointments---patient seemed very pleasant with today's visit.          CONCLUSION-   1. Postoperative changes from gastric pull up procedure visualized.  Barium was noted to pass freely from the mouth into the small bowel  without evidence of obstruction.  2. Unable to determine whether there is mucosal irregularity due to  the presence of collapsed gastric mucosa in most of the images.  3. No significant gastric peristalsis was visualized, this " resulted  in the colon barium remaining in the proximal esophagus when the  patient was in the prone position, and only proceeded into the gastric  pull-up when the head of the table was raised.      Electronically Signed By- Moses Mcnally MD On: 2016-10-21 13:36:55    Tiffany Jimenez 12/2016  Maddison Sheridan is a 56 y.o. female who presents for Chronic pain secondary from esophageal cancer s/p surgery. Stomach has been moved up to accommodate shortened esophagus. Goes next month to Dr. Connell. She followed up with Dr. Bang who put her on Reglan. She stated it is helping some. She has no side effects with opioids.   The following portions of the patient's history were reviewed by me and updated as appropriate: allergies, current medications, past family history, past medical history, past social history, past surgical history and problem list.    Past Medical History   Diagnosis Date   • Abnormal weight loss    • Anxiety    • Atrophic vaginitis    • Degeneration of lumbar intervertebral disc    • Depression    • Dysphagia      pharyngoesophageal phase      • Encounter for surgical aftercare following surgery of digestive system      [place] Michael (thoracic esophagectomy, lymphadenectomly, Bronchoscopy, Placement on Q. 3/1/16      • Essential hypertension    • Headache    • History of esophagogastroduodenoscopy      Normal hypopharynx.A tumor was found in the middle third of the esophagus.Mul.biopsies taken.Normal GE junction.Mild nonerosive gastritis in antrum.Biopsies taken.Normal pylorus and duodenum. 09/24/2015       • History of hysteroscopy      Hysteroscopy, dilation and curettage, and repair of vaginal laceration. 08/03/2013       • History of substance abuse    • Lumbosacral strain    • Myopia    • Presbyopia    • Primary malignant neoplasm of thoracic part of esophagus      Squamous cell   • Tobacco dependence      1 ppd       Social History     Social History   • Marital status: Single      Spouse name: N/A   • Number of children: N/A   • Years of education: N/A     Occupational History   • Not on file.     Social History Main Topics   • Smoking status: Former Smoker     Quit date: 2015   • Smokeless tobacco: Not on file      Comment: any smoking   • Alcohol use No   • Drug use: No   • Sexual activity: Defer     Other Topics Concern   • Not on file     Social History Narrative       Family History   Problem Relation Age of Onset   • Ovarian cancer Mother    • Cancer Mother    • Osteoporosis Other    • Heart disease Other    • Diabetes Other    • Cancer Other      Colorectal-Parent   • Asthma Other    • Asthma Sister    • Diabetes Sister    • Hypertension Sister    • Alcohol abuse Brother    • Cancer Maternal Uncle          Current Outpatient Prescriptions:   •  albuterol (PROVENTIL HFA;VENTOLIN HFA) 108 (90 BASE) MCG/ACT inhaler, Inhale 2 puffs Every 4 (Four) Hours As Needed for wheezing., Disp: 3.7 g, Rfl: 1  •  buPROPion XL (WELLBUTRIN XL) 150 MG 24 hr tablet, Take 1 tablet by mouth Daily., Disp: 30 tablet, Rfl: 5  •  docusate sodium (COLACE) 100 MG capsule, Take 1 capsule by mouth 2 (Two) Times a Day., Disp: 60 capsule, Rfl: 3  •  HYDROcodone-acetaminophen (NORCO)  MG per tablet, Take 1 tablet by mouth 3 (Three) Times a Day As Needed for moderate pain (4-6)., Disp: 90 tablet, Rfl: 0  •  hydrOXYzine (VISTARIL) 25 MG capsule, Take 25 mg by mouth Daily., Disp: , Rfl:   •  loperamide (IMODIUM) 2 MG capsule, Take 1 capsule by mouth 3 (three) times a day., Disp: 180 capsule, Rfl: 1  •  NEXIUM 24HR 20 MG capsule, Take 1 capsule by mouth Daily., Disp: 30 capsule, Rfl: 5  •  Nutritional Supplements (BOOST) liquid, Take 1 can by mouth As Needed., Disp: , Rfl:   •  ondansetron ODT (ZOFRAN-ODT) 4 MG disintegrating tablet, Take 1 tablet by mouth Every 6 (Six) Hours As Needed for nausea or vomiting., Disp: 60 tablet, Rfl: 1  •  sucralfate (CARAFATE) 1 GM/10ML suspension, Take 10 mL by mouth 4 (Four) Times a  Day., Disp: 4 g, Rfl: 5    Allergies   Allergen Reactions   • Penicillins Hives           Review of Systems   Musculoskeletal:        Right chest pain (below clavicle)     10 system review of systems was reviewed and negative except for above.    Physical Exam   Constitutional: She is oriented to person, place, and time. She appears well-developed. No distress.   113 lbs  Thin appearance   HENT:   Head: Normocephalic and atraumatic.   Nose: Nose normal.   Eyes: Conjunctivae are normal. Pupils are equal, round, and reactive to light.   Neck: Normal range of motion. Neck supple. No spinous process tenderness and no muscular tenderness present. Normal range of motion present.   Cardiovascular: Normal rate and regular rhythm.    Pulmonary/Chest: Effort normal and breath sounds normal.   Abdominal: Soft. Bowel sounds are normal. There is no rigidity, no rebound and no guarding.   Musculoskeletal: Normal range of motion.        Arms:  Pain to right sub clavical region with swallowing of solids   Neurological: She is alert and oriented to person, place, and time. She has normal reflexes. She displays no tremor and normal reflexes. No sensory deficit. She displays no seizure activity. Gait normal.   Reflex Scores:       Tricep reflexes are 2+ on the right side and 2+ on the left side.       Bicep reflexes are 2+ on the right side and 2+ on the left side.       Brachioradialis reflexes are 2+ on the right side and 2+ on the left side.       Patellar reflexes are 2+ on the right side and 2+ on the left side.       Achilles reflexes are 2+ on the right side and 2+ on the left side.  Skin: Skin is warm and dry.   Psychiatric: She has a normal mood and affect. Judgment normal. She expresses no homicidal and no suicidal ideation. She expresses no suicidal plans and no homicidal plans.   Nursing note and vitals reviewed.      Maddison was seen today for pain.    Diagnoses and all orders for this visit:    Stricture  "esophagus    History of esophageal cancer    Pain of esophagus    Dysphagia, unspecified type        Medication: None at this time. Pt takes Norco 10mg TID Dr. Jimenez and GI medications as well.  After reviewing the aberrant UDS is we are unable to take over Norco pain medications, patient may follow up when necessary for any possible interventions at may follow.    Interventional: none at this time. Pt will follow up with Dr. Jimenez feb. 17 and discuss aberrant UDS and missed UDS. Opiate medications will not be apart of today's visit. Pt may follow up PRN.     Rehab: Pt follows up at [facility] for her continuing Dysphagia and esophagus issues.     Behavioral: No aberrant behavior noted. ANNETTE Report # [id number] was reviewed and is consistent with stated history    Urine drug screen None at this time.  After reviewing lab notes with UDS from Dr. Jimenez.  It is noted that in December 2016 patient was positive for \"amphetamines and cocaine\".  Patient states she doesn't know how those get in her system.  Also noted and January that patient did not \"show up or complete a urinary drug screen\" ordered by Dr. Jimenez.    ORT: 4  Depression controlled with Wellbutrin Dr. Jimenez. No suicidal or thoughts. No mental health. Years ago experimented with Marijuana and Cocaine in [place].     PHQ-9: 10          This document has been electronically signed by AKASH Gambino on February 8, 2017 6:52 PM          This document has been electronically signed by AKASH Gambino on February 8, 2017 6:52 PM     "

## 2017-02-16 DIAGNOSIS — Z85.01 HISTORY OF ESOPHAGEAL CANCER: Primary | ICD-10-CM

## 2017-02-16 DIAGNOSIS — C15.9 MALIGNANT NEOPLASM OF ESOPHAGUS, UNSPECIFIED LOCATION (HCC): ICD-10-CM

## 2017-02-17 ENCOUNTER — APPOINTMENT (OUTPATIENT)
Dept: LAB | Facility: HOSPITAL | Age: 57
End: 2017-02-17

## 2017-02-17 ENCOUNTER — OFFICE VISIT (OUTPATIENT)
Dept: FAMILY MEDICINE CLINIC | Facility: CLINIC | Age: 57
End: 2017-02-17

## 2017-02-17 VITALS
SYSTOLIC BLOOD PRESSURE: 140 MMHG | OXYGEN SATURATION: 100 % | HEIGHT: 63 IN | BODY MASS INDEX: 20.54 KG/M2 | WEIGHT: 115.9 LBS | HEART RATE: 96 BPM | DIASTOLIC BLOOD PRESSURE: 80 MMHG

## 2017-02-17 DIAGNOSIS — R06.2 WHEEZING: ICD-10-CM

## 2017-02-17 DIAGNOSIS — G89.28 OTHER CHRONIC POSTPROCEDURAL PAIN: Primary | ICD-10-CM

## 2017-02-17 PROCEDURE — 80307 DRUG TEST PRSMV CHEM ANLYZR: CPT | Performed by: FAMILY MEDICINE

## 2017-02-17 PROCEDURE — 99213 OFFICE O/P EST LOW 20 MIN: CPT | Performed by: FAMILY MEDICINE

## 2017-02-17 RX ORDER — ALBUTEROL SULFATE 90 UG/1
2 AEROSOL, METERED RESPIRATORY (INHALATION) EVERY 4 HOURS PRN
Qty: 18 G | Refills: 11 | Status: SHIPPED | OUTPATIENT
Start: 2017-02-17

## 2017-02-17 RX ORDER — HYDROCODONE BITARTRATE AND ACETAMINOPHEN 10; 325 MG/1; MG/1
1 TABLET ORAL 3 TIMES DAILY PRN
Qty: 90 TABLET | Refills: 0 | Status: SHIPPED | OUTPATIENT
Start: 2017-02-17 | End: 2017-03-20 | Stop reason: SDUPTHER

## 2017-02-17 NOTE — PROGRESS NOTES
Subjective:     Maddison Sheridan is a 56 y.o. female who presents for Chronic pain secondary from esophageal cancer s/p surgery. Stomach has been moved up to accommodate shortened esophagus.   Patient is to see Dr. Connell soon for esophageal stretching.  She has no side effects of medication at this time.  Patient does state that she uses one in the morning and 1 at night however pain returns approximately in early afternoon and continues until her nighttime dose.  Her nighttime dose has helped her to sleep.  She was requesting her Norco be increased to 3 times a day    History:   Duration of the patient's pain: a few year(s)   Legal issues? no   Current controlled medication and doses: Norco 10/325 BID   Medications that have failed: Vistaril, Benadryl, Ibuprofen, Tylenol   Side effects of current medications: constipation   Satisfaction with treatment: yes    Functional Status:   Able to feed self: yes   Able to bathe self: yes   Able to use the toilet independently: yes   Able to dress self: yes   Able to get up from bed or chair without assistance: yes    Adverse Events:   Constipation: yes   Lethargy: yes   Hypogonadism: no    Aberrant Behavior:   Over dose: no   Run out of medications early: no   Last UDS consistent: yes    Analgesia:   Pain scale prior to meds: 9    Pain scale after meds started: 6   Taking medications as prescribed: yes     The following portions of the patient's history were reviewed and updated as appropriate: allergies, current medications, past family history, past medical history, past social history, past surgical history and problem list.    Preventative:  Over the past 2 weeks, have you felt down, depressed, or hopeless?Yes   Over the past 2 weeks, have you felt little interest or pleasure in doing things?Yes  Clinical depression screening refused by patient.Yes     On osteoporosis therapy?No     Past Medical Hx:  Past Medical History   Diagnosis Date   • Abnormal weight  loss    • Anxiety    • Atrophic vaginitis    • Degeneration of lumbar intervertebral disc    • Depression    • Dysphagia      pharyngoesophageal phase      • Encounter for surgical aftercare following surgery of digestive system      [facility] Michael (thoracic esophagectomy, lymphadenectomly, Bronchoscopy, Placement on Q. 3/1/16      • Essential hypertension    • Headache    • History of esophagogastroduodenoscopy      Normal hypopharynx.A tumor was found in the middle third of the esophagus.Mul.biopsies taken.Normal GE junction.Mild nonerosive gastritis in antrum.Biopsies taken.Normal pylorus and duodenum. 09/24/2015       • History of hysteroscopy      Hysteroscopy, dilation and curettage, and repair of vaginal laceration. 08/03/2013       • History of substance abuse    • Lumbosacral strain    • Myopia    • Presbyopia    • Primary malignant neoplasm of thoracic part of esophagus      Squamous cell   • Tobacco dependence      1 ppd       Past Surgical Hx:  Past Surgical History   Procedure Laterality Date   • Abdominal surgery  03/01/2016     Abdominal portion of an [place] Michael esophagogastrectomy with jejunostomy tube placement. Esophageal carcinoma.   • Lumbar disc surgery     • Esophagus surgery       Thoracic esophagectomy ([place] Michael type).Thoracic lymphadenectomy.Fiberoptic bronchoscopy.Placement of On Q pain pump.Jejunostomy tube placement.Pyloromyotomy. 03/01/2016      • Endoscopy N/A 2/3/2017     Procedure: ESOPHAGOGASTRODUODENOSCOPY;  Surgeon: Babar Connell MD;  Location: [facility] ENDOSCOPY;  Service:        Health Maintenance:  Health Maintenance   Topic Date Due   • HEPATITIS C SCREENING  08/24/2016   • MAMMOGRAM  08/24/2018   • PAP SMEAR  12/23/2019   • TDAP/TD VACCINES (2 - Td) 01/01/2023   • COLONOSCOPY  08/24/2026   • INFLUENZA VACCINE  Addressed       Current Meds:    Current Outpatient Prescriptions:   •  albuterol (PROVENTIL HFA;VENTOLIN HFA) 108 (90 BASE) MCG/ACT inhaler, Inhale 2 puffs Every 4 (Four)  Hours As Needed for wheezing., Disp: 3.7 g, Rfl: 1  •  buPROPion XL (WELLBUTRIN XL) 150 MG 24 hr tablet, Take 1 tablet by mouth Daily., Disp: 30 tablet, Rfl: 5  •  docusate sodium (COLACE) 100 MG capsule, Take 1 capsule by mouth 2 (Two) Times a Day., Disp: 60 capsule, Rfl: 3  •  HYDROcodone-acetaminophen (NORCO)  MG per tablet, Take 1 tablet by mouth 3 (Three) Times a Day As Needed for moderate pain (4-6)., Disp: 90 tablet, Rfl: 0  •  hydrOXYzine (VISTARIL) 25 MG capsule, Take 25 mg by mouth Daily., Disp: , Rfl:   •  loperamide (IMODIUM) 2 MG capsule, Take 1 capsule by mouth 3 (three) times a day., Disp: 180 capsule, Rfl: 1  •  NEXIUM 24HR 20 MG capsule, Take 1 capsule by mouth Daily., Disp: 30 capsule, Rfl: 5  •  Nutritional Supplements (BOOST) liquid, Take 1 can by mouth As Needed., Disp: , Rfl:   •  ondansetron ODT (ZOFRAN-ODT) 4 MG disintegrating tablet, Take 1 tablet by mouth Every 6 (Six) Hours As Needed for nausea or vomiting., Disp: 60 tablet, Rfl: 1  •  sucralfate (CARAFATE) 1 GM/10ML suspension, Take 10 mL by mouth 4 (Four) Times a Day., Disp: 4 g, Rfl: 5  •  albuterol (PROVENTIL HFA;VENTOLIN HFA) 108 (90 BASE) MCG/ACT inhaler, Inhale 2 puffs Every 4 (Four) Hours As Needed for wheezing., Disp: 18 g, Rfl: 11    Allergies:  Penicillins    Family Hx:  Family History   Problem Relation Age of Onset   • Ovarian cancer Mother    • Cancer Mother    • Osteoporosis Other    • Heart disease Other    • Diabetes Other    • Cancer Other      Colorectal-Parent   • Asthma Other    • Asthma Sister    • Diabetes Sister    • Hypertension Sister    • Alcohol abuse Brother    • Cancer Maternal Uncle         Social History:  Social History     Social History   • Marital status: Single     Spouse name: N/A   • Number of children: N/A   • Years of education: N/A     Occupational History   • Not on file.     Social History Main Topics   • Smoking status: Former Smoker     Quit date: 2015   • Smokeless tobacco: Not on file  "     Comment: any smoking   • Alcohol use No   • Drug use: No   • Sexual activity: Defer     Other Topics Concern   • Not on file     Social History Narrative       Review of Systems  Review of Systems   Constitutional: Negative.  Negative for fatigue and fever.   HENT: Negative.  Negative for ear pain and sore throat.    Eyes: Negative.  Negative for pain and visual disturbance.   Respiratory: Negative for cough and shortness of breath.    Cardiovascular: Negative.  Negative for chest pain and palpitations.   Gastrointestinal: Negative for abdominal pain and nausea.   Endocrine: Negative.    Genitourinary: Negative for dysuria.   Musculoskeletal: Negative for arthralgias.        Pain secondary to esophageal cancer. Difficulty sleeping due to Gerd.    Skin: Negative for rash.   Allergic/Immunologic: Negative.    Neurological: Negative for dizziness, weakness and headaches.   Psychiatric/Behavioral: Negative for sleep disturbance.       Objective:     Visit Vitals   • /80 (BP Location: Left arm, Patient Position: Sitting)   • Pulse 96   • Ht 63\" (160 cm)   • Wt 115 lb 14.4 oz (52.6 kg)   • LMP 03/17/1989 (Within Months)   • SpO2 100%   • BMI 20.53 kg/m2     Physical exam   General:  alert, appears stated age, cooperative and no distress   Oropharynx: lips, mucosa, and tongue normal; teeth and gums normal    Eyes:  conjunctivae/corneas clear. PERRL, EOM's intact. Fundi benign.    Ears:  normal TM's and external ear canals both ears   Neck: no adenopathy, no carotid bruit, no JVD, supple, symmetrical, trachea midline and thyroid not enlarged, symmetric, no tenderness/mass/nodules   Thyroid:  no palpable nodule   Lung: clear to auscultation bilaterally   Heart:  regular rate and rhythm, S1, S2 normal, no murmur, click, rub or gallop   Abdomen: soft, non-tender; bowel sounds normal; no masses,  no organomegaly   Extremities: extremities normal, atraumatic, no cyanosis or edema   Skin: warm and dry, no " hyperpigmentation, vitiligo, or suspicious lesions   Pulses: 2+ and symmetric   Neuro: normal without focal findings, mental status, speech normal, alert and oriented x3 and reflexes normal and symmetric       Lab Review  none     Assessment:      Diagnosis Plan   1. Other chronic postprocedural pain  HYDROcodone-acetaminophen (NORCO)  MG per tablet   2. Wheezing  albuterol (PROVENTIL HFA;VENTOLIN HFA) 108 (90 BASE) MCG/ACT inhaler          Plan:     As part of this patient's treatment plan, I am prescribing controlled substances. The patient has been made aware of appropriate use of such medications, including potential risk of somnolence, limited ability to drive and /or work safely, and potential for dependence or overdose. It has also been made clear that these medications are for use by this patient only, without concomitant use of alcohol or other substances unless prescribed.    Patient has completed prescribing agreement detailing terms of continued prescribing of controlled substances, including monitoring ANNETTE reports, urine drug screening, and pill counts if necessary. The patient is aware that inappropriate use will result in cessation of prescribing such medications.    History and physical exam exhibit continued safe and appropriate use of controlled substances.    1.  Controlled substance abuse agreement discussed and copy in record: yes, prior  2.  Discussed:   Risk of addiction: yes   Specific risk of medications: yes   Side effects of medications: yes   Reasonable expectations of the medication: yes  3.  Treatment Objectives:   Discussed management of constipation: yes   Discussed management of other side effects: yes   4.  eKASPER:     ANNETTE report has been reviewed by: Tiffany Jimenez MD on 02/23/17.  The report was scanned into the patient's chart.   Date of last ANNETTE:  2/20/2017 appropriate   Annette # [id number]   UDS Today again  5.  Results and date of last drug screen: We'll obtain  today  6.  Follow up plan:    1 month   Recheck if not improving: yes   Continue on current medications as directed: yes   7.  Adjustments:   We'll increase medication 3 times a day      RISK SCORE: 4          This document has been electronically signed by Tiffany Jimenez MD on February 23, 2017 10:39 AM

## 2017-02-17 NOTE — PROGRESS NOTES
I have seen the patient.  I have reviewed the notes, assessments, and/or procedures performed by dr Jimenez I agree with her/his documentation and assessment and plan for Maddison Sheridan.  [place]

## 2017-02-22 LAB
AMPHETAMINES UR QL SCN: NEGATIVE NG/ML
BARBITURATES UR QL SCN: NEGATIVE NG/ML
BENZODIAZ UR QL SCN: NEGATIVE NG/ML
BZE UR QL SCN: NEGATIVE NG/ML
CANNABINOIDS UR QL SCN: NEGATIVE NG/ML
CREAT 24H UR-MCNC: 104 MG/DL (ref 20–300)
FENTANYL+NORFENTANYL UR QL SCN: NEGATIVE PG/ML
Lab: ABNORMAL
MEPERIDINE UR CFM-MCNC: NEGATIVE NG/ML
METHADONE UR QL SCN: NEGATIVE NG/ML
OPIATES TESTED UR SCN: POSITIVE NG/ML
OXYCODONE/OXYMORPHONE, URINE: NEGATIVE NG/ML
PCP UR QL: NEGATIVE NG/ML
PH UR STRIP.AUTO: 8 [PH] (ref 4.5–8.9)
PROPOXYPH UR QL SCN: NEGATIVE NG/ML
SP GR UR: 1.02
TRAMADOL UR QL SCN: NEGATIVE NG/ML

## 2017-03-01 ENCOUNTER — LAB REQUISITION (OUTPATIENT)
Dept: LAB | Facility: HOSPITAL | Age: 57
End: 2017-03-01

## 2017-03-01 ENCOUNTER — HOSPITAL ENCOUNTER (OUTPATIENT)
Dept: CT IMAGING | Facility: HOSPITAL | Age: 57
Discharge: HOME | End: 2017-03-01
Attending: THORACIC SURGERY (CARDIOTHORACIC VASCULAR SURGERY) | Admitting: THORACIC SURGERY (CARDIOTHORACIC VASCULAR SURGERY)

## 2017-03-01 ENCOUNTER — APPOINTMENT (OUTPATIENT)
Dept: CT IMAGING | Facility: HOSPITAL | Age: 57
End: 2017-03-01
Attending: THORACIC SURGERY (CARDIOTHORACIC VASCULAR SURGERY)

## 2017-03-01 DIAGNOSIS — C15.9 MALIGNANT NEOPLASM OF ESOPHAGUS, UNSPECIFIED LOCATION (HCC): ICD-10-CM

## 2017-03-01 DIAGNOSIS — Z85.01 PERSONAL HISTORY OF MALIGNANT NEOPLASM OF ESOPHAGUS: ICD-10-CM

## 2017-03-01 DIAGNOSIS — C15.9 MALIGNANT NEOPLASM OF ESOPHAGUS (HCC): ICD-10-CM

## 2017-03-01 DIAGNOSIS — Z85.01 HISTORY OF ESOPHAGEAL CANCER: ICD-10-CM

## 2017-03-01 LAB
ALBUMIN SERPL-MCNC: 3.7 G/DL (ref 3.4–4.8)
ALBUMIN/GLOB SERPL: 1.2 G/DL (ref 1.1–1.8)
ALP SERPL-CCNC: 78 U/L (ref 38–126)
ALT SERPL W P-5'-P-CCNC: 22 U/L (ref 9–52)
ANION GAP SERPL CALCULATED.3IONS-SCNC: 9 MMOL/L (ref 5–15)
AST SERPL-CCNC: 18 U/L (ref 14–36)
BILIRUB SERPL-MCNC: 1.1 MG/DL (ref 0.2–1.3)
BUN BLD-MCNC: 16 MG/DL (ref 7–21)
BUN/CREAT SERPL: 16.5 (ref 7–25)
CALCIUM SPEC-SCNC: 8.6 MG/DL (ref 8.4–10.2)
CHLORIDE SERPL-SCNC: 103 MMOL/L (ref 95–110)
CO2 SERPL-SCNC: 28 MMOL/L (ref 22–31)
CREAT BLD-MCNC: 0.97 MG/DL (ref 0.5–1)
GFR SERPL CREATININE-BSD FRML MDRD: 71 ML/MIN/1.73 (ref 51–120)
GLOBULIN UR ELPH-MCNC: 3 GM/DL (ref 2.3–3.5)
GLUCOSE BLD-MCNC: 111 MG/DL (ref 60–100)
POTASSIUM BLD-SCNC: 3.4 MMOL/L (ref 3.5–5.1)
PROT SERPL-MCNC: 6.7 G/DL (ref 6.3–8.6)
SODIUM BLD-SCNC: 140 MMOL/L (ref 137–145)

## 2017-03-01 PROCEDURE — 80053 COMPREHEN METABOLIC PANEL: CPT | Performed by: THORACIC SURGERY (CARDIOTHORACIC VASCULAR SURGERY)

## 2017-03-01 PROCEDURE — 71260 CT THORAX DX C+: CPT

## 2017-03-01 PROCEDURE — 74160 CT ABDOMEN W/CONTRAST: CPT

## 2017-03-01 PROCEDURE — 0 IOPAMIDOL 61 % SOLUTION: Performed by: THORACIC SURGERY (CARDIOTHORACIC VASCULAR SURGERY)

## 2017-03-01 PROCEDURE — 36415 COLL VENOUS BLD VENIPUNCTURE: CPT | Performed by: THORACIC SURGERY (CARDIOTHORACIC VASCULAR SURGERY)

## 2017-03-01 RX ADMIN — IOPAMIDOL 80 ML: 612 INJECTION, SOLUTION INTRAVENOUS at 09:30

## 2017-03-02 ENCOUNTER — OFFICE VISIT (OUTPATIENT)
Dept: GASTROENTEROLOGY | Facility: CLINIC | Age: 57
End: 2017-03-02

## 2017-03-02 VITALS
DIASTOLIC BLOOD PRESSURE: 90 MMHG | SYSTOLIC BLOOD PRESSURE: 130 MMHG | BODY MASS INDEX: 20.53 KG/M2 | HEART RATE: 69 BPM | WEIGHT: 115.9 LBS

## 2017-03-02 DIAGNOSIS — R13.10 DYSPHAGIA, UNSPECIFIED TYPE: Primary | ICD-10-CM

## 2017-03-02 PROCEDURE — 99213 OFFICE O/P EST LOW 20 MIN: CPT | Performed by: INTERNAL MEDICINE

## 2017-03-02 RX ORDER — SODIUM CHLORIDE 0.9 % (FLUSH) 0.9 %
1-10 SYRINGE (ML) INJECTION AS NEEDED
Status: CANCELLED | OUTPATIENT
Start: 2017-04-26

## 2017-03-02 RX ORDER — DEXTROSE AND SODIUM CHLORIDE 5; .45 G/100ML; G/100ML
30 INJECTION, SOLUTION INTRAVENOUS CONTINUOUS PRN
Status: CANCELLED | OUTPATIENT
Start: 2017-04-26

## 2017-03-02 NOTE — PROGRESS NOTES
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    Plan; we'll schedule patient for EGD with dilatation.    Past Medical History:   Past Medical History   Diagnosis Date   • Abnormal weight loss    • Anxiety    • Atrophic vaginitis    • Degeneration of lumbar intervertebral disc    • Depression    • Dysphagia      pharyngoesophageal phase      • Encounter for surgical aftercare following surgery of digestive system      Walter Michael (thoracic esophagectomy, lymphadenectomly, Bronchoscopy, Placement on Q. 3/1/16      • Essential hypertension    • Headache    • History of esophagogastroduodenoscopy      Normal hypopharynx.A tumor was found in the middle third of the esophagus.Mul.biopsies taken.Normal GE junction.Mild nonerosive gastritis in antrum.Biopsies taken.Normal pylorus and duodenum. 09/24/2015       • History of hysteroscopy      Hysteroscopy, dilation and curettage, and repair of vaginal laceration. 08/03/2013       • History of substance abuse    • Lumbosacral strain    • Myopia    • Presbyopia    • Primary malignant neoplasm of thoracic part of esophagus      Squamous cell   • Tobacco dependence      1 ppd       Family History:  Family History   Problem Relation Age of Onset   • Ovarian cancer Mother    • Cancer Mother    • Osteoporosis Other    • Heart disease Other    • Diabetes Other    • Cancer Other      Colorectal-Parent   • Asthma Other    • Asthma Sister    • Diabetes Sister    • Hypertension Sister    • Alcohol abuse Brother    • Cancer Maternal Uncle         Social History:   reports that she quit smoking about 2 years ago. She does not have any smokeless tobacco history on file. She reports that she does not drink alcohol or use illicit drugs.    Medications:   Current Outpatient Prescriptions   Medication Sig Dispense Refill   • albuterol (PROVENTIL HFA;VENTOLIN HFA) 108 (90 BASE) MCG/ACT inhaler Inhale 2 puffs Every 4 (Four) Hours As Needed for wheezing. 3.7 g 1   • albuterol (PROVENTIL HFA;VENTOLIN HFA) 108 (90 BASE) MCG/ACT inhaler Inhale 2 puffs Every 4 (Four) Hours As Needed for wheezing. 18 g 11   • buPROPion XL (WELLBUTRIN XL) 150 MG 24 hr tablet Take 1 tablet by mouth Daily. 30 tablet 5   • docusate sodium (COLACE) 100 MG capsule Take 1 capsule by mouth 2 (Two) Times a Day. 60 capsule 3   • HYDROcodone-acetaminophen (NORCO)  MG per tablet Take 1 tablet by mouth 3 (Three) Times a Day As Needed for moderate pain (4-6). 90 tablet 0   • hydrOXYzine (VISTARIL) 25 MG capsule Take 25 mg by mouth Daily.     • loperamide (IMODIUM) 2 MG capsule Take 1 capsule by mouth 3 (three) times a day. 180 capsule 1   • NEXIUM 24HR 20 MG capsule Take 1 capsule by mouth Daily. 30 capsule 5   • Nutritional Supplements (BOOST) liquid Take 1 can by mouth As Needed.     • ondansetron ODT (ZOFRAN-ODT) 4 MG disintegrating tablet Take 1 tablet by mouth Every 6 (Six) Hours As Needed for nausea or vomiting. 60 tablet 1   • sucralfate (CARAFATE) 1 GM/10ML suspension Take 10 mL by mouth 4 (Four) Times a Day. 4 g 5     No current facility-administered medications for this visit.        Allergies:  Penicillins    ROS:    Review of Systems   HENT: Negative for congestion, sore throat and trouble swallowing.    Respiratory: Negative for apnea, cough, choking, chest tightness, shortness of breath, wheezing and stridor.    Cardiovascular: Negative for chest pain, palpitations and leg swelling.   Gastrointestinal: Negative for abdominal distention, abdominal pain, anal bleeding, blood in  stool, constipation, diarrhea, nausea, rectal pain and vomiting.   Skin: Negative for color change, pallor, rash and wound.   Neurological: Negative for dizziness, syncope, weakness and headaches.   Psychiatric/Behavioral: Negative for agitation, behavioral problems, confusion and decreased concentration.     Objective     Blood pressure 130/90, pulse 69, weight 115 lb 14.4 oz (52.6 kg), last menstrual period 03/17/1989, not currently breastfeeding.    Physical Exam   Constitutional: She is oriented to person, place, and time. She appears well-developed and well-nourished. No distress.   HENT:   Head: Normocephalic and atraumatic.   Cardiovascular: Normal rate, regular rhythm, normal heart sounds and intact distal pulses.  Exam reveals no gallop and no friction rub.    No murmur heard.  Pulmonary/Chest: Breath sounds normal. No respiratory distress. She has no wheezes. She has no rales. She exhibits no tenderness.   Abdominal: Soft. Bowel sounds are normal. She exhibits no distension and no mass. There is no tenderness. There is no rebound and no guarding. No hernia.   Musculoskeletal: Normal range of motion. She exhibits no edema.   Neurological: She is alert and oriented to person, place, and time.   Skin: Skin is warm and dry. No rash noted. She is not diaphoretic. No erythema. No pallor.   Psychiatric: She has a normal mood and affect. Her behavior is normal. Judgment and thought content normal.        Assessment/Plan   Maddison was seen today for egd and abdominal pain.    Diagnoses and all orders for this visit:    Dysphagia, unspecified type  -     Case Request; Standing  -     sodium chloride 0.9 % flush 1-10 mL; Infuse 1-10 mL into a venous catheter As Needed for line care.  -     dextrose 5 % and sodium chloride 0.45 % infusion; Infuse 30 mL/hr into a venous catheter Continuous As Needed (Start Prior to Procedure).  -     Case Request    Other orders  -     Implement Anesthesia Orders Day of Procedure;  Standing  -     Obtain Informed Consent; Standing  -     Insert Peripheral IV; Standing  -     Saline Lock & Maintain IV Access; Standing        ESOPHAGOGASTRODUODENOSCOPY (N/A)     Diagnosis Plan   1. Dysphagia, unspecified type  Case Request    sodium chloride 0.9 % flush 1-10 mL    dextrose 5 % and sodium chloride 0.45 % infusion    Case Request       Anticipated Surgical Procedure:  No orders of the defined types were placed in this encounter.      The risks, benefits, and alternatives of this procedure have been discussed with the patient or the responsible party- the patient understands and agrees to proceed.

## 2017-03-20 ENCOUNTER — PROCEDURE VISIT (OUTPATIENT)
Dept: FAMILY MEDICINE CLINIC | Facility: CLINIC | Age: 57
End: 2017-03-20

## 2017-03-20 VITALS
WEIGHT: 110.4 LBS | BODY MASS INDEX: 19.56 KG/M2 | HEART RATE: 75 BPM | DIASTOLIC BLOOD PRESSURE: 70 MMHG | HEIGHT: 63 IN | OXYGEN SATURATION: 98 % | SYSTOLIC BLOOD PRESSURE: 124 MMHG

## 2017-03-20 DIAGNOSIS — Z01.419 ENCOUNTER FOR GYNECOLOGICAL EXAMINATION: Primary | ICD-10-CM

## 2017-03-20 DIAGNOSIS — G89.28 OTHER CHRONIC POSTPROCEDURAL PAIN: ICD-10-CM

## 2017-03-20 PROCEDURE — 88142 CYTOPATH C/V THIN LAYER: CPT | Performed by: FAMILY MEDICINE

## 2017-03-20 PROCEDURE — 99213 OFFICE O/P EST LOW 20 MIN: CPT | Performed by: FAMILY MEDICINE

## 2017-03-20 RX ORDER — HYDROCODONE BITARTRATE AND ACETAMINOPHEN 10; 325 MG/1; MG/1
1 TABLET ORAL 3 TIMES DAILY PRN
Qty: 90 TABLET | Refills: 0 | Status: SHIPPED | OUTPATIENT
Start: 2017-03-20 | End: 2017-04-20 | Stop reason: SDUPTHER

## 2017-03-20 NOTE — PROGRESS NOTES
Subjective:     Maddison Sheridan is a 56 y.o. female who presents for Chronic pain secondary from esophageal cancer s/p surgery. Stomach has been moved up to accommodate shortened esophagus.   Patient is to see Dr. Connell soon for esophageal stretching.  She has no side effects of medication at this time.  Patient does state that she uses one in the morning and 1 at night however pain returns approximately in early afternoon and continues until her nighttime dose.  Her nighttime dose has helped her to sleep.  She was requesting her Norco be increased to 3 times a day    History:   Duration of the patient's pain: a few year(s)   Legal issues? no   Current controlled medication and doses: Norco 10/325 BID   Medications that have failed: Vistaril, Benadryl, Ibuprofen, Tylenol   Side effects of current medications: constipation   Satisfaction with treatment: yes    Functional Status:   Able to feed self: yes   Able to bathe self: yes   Able to use the toilet independently: yes   Able to dress self: yes   Able to get up from bed or chair without assistance: yes    Adverse Events:   Constipation: yes   Lethargy: yes   Hypogonadism: no    Aberrant Behavior:   Over dose: no   Run out of medications early: no   Last UDS consistent: yes    Analgesia:   Pain scale prior to meds: 9    Pain scale after meds started: 6   Taking medications as prescribed: yes     The following portions of the patient's history were reviewed and updated as appropriate: allergies, current medications, past family history, past medical history, past social history, past surgical history and problem list.    Preventative:  Over the past 2 weeks, have you felt down, depressed, or hopeless?Yes   Over the past 2 weeks, have you felt little interest or pleasure in doing things?Yes  Clinical depression screening refused by patient.Yes     On osteoporosis therapy?No     Past Medical Hx:  Past Medical History   Diagnosis Date   • Abnormal weight  loss    • Anxiety    • Atrophic vaginitis    • Degeneration of lumbar intervertebral disc    • Depression    • Dysphagia      pharyngoesophageal phase      • Encounter for surgical aftercare following surgery of digestive system      Walter Michael (thoracic esophagectomy, lymphadenectomly, Bronchoscopy, Placement on Q. 3/1/16      • Essential hypertension    • Headache    • History of esophagogastroduodenoscopy      Normal hypopharynx.A tumor was found in the middle third of the esophagus.Mul.biopsies taken.Normal GE junction.Mild nonerosive gastritis in antrum.Biopsies taken.Normal pylorus and duodenum. 09/24/2015       • History of hysteroscopy      Hysteroscopy, dilation and curettage, and repair of vaginal laceration. 08/03/2013       • History of substance abuse    • Lumbosacral strain    • Myopia    • Presbyopia    • Primary malignant neoplasm of thoracic part of esophagus      Squamous cell   • Tobacco dependence      1 ppd       Past Surgical Hx:  Past Surgical History   Procedure Laterality Date   • Abdominal surgery  03/01/2016     Abdominal portion of an [place] Michael esophagogastrectomy with jejunostomy tube placement. Esophageal carcinoma.   • Lumbar disc surgery     • Esophagus surgery       Thoracic esophagectomy ([place] Michael type).Thoracic lymphadenectomy.Fiberoptic bronchoscopy.Placement of On Q pain pump.Jejunostomy tube placement.Pyloromyotomy. 03/01/2016      • Endoscopy N/A 2/3/2017     Procedure: ESOPHAGOGASTRODUODENOSCOPY;  Surgeon: Babar Connell MD;  Location: [facility] ENDOSCOPY;  Service:    • Upper gastrointestinal endoscopy  02/03/2017       Health Maintenance:  Health Maintenance   Topic Date Due   • HEPATITIS C SCREENING  08/24/2016   • MAMMOGRAM  08/24/2018   • PAP SMEAR  12/23/2019   • TDAP/TD VACCINES (2 - Td) 01/01/2023   • COLONOSCOPY  08/24/2026   • INFLUENZA VACCINE  Addressed       Current Meds:    Current Outpatient Prescriptions:   •  albuterol (PROVENTIL HFA;VENTOLIN HFA) 108 (90  BASE) MCG/ACT inhaler, Inhale 2 puffs Every 4 (Four) Hours As Needed for wheezing., Disp: 3.7 g, Rfl: 1  •  albuterol (PROVENTIL HFA;VENTOLIN HFA) 108 (90 BASE) MCG/ACT inhaler, Inhale 2 puffs Every 4 (Four) Hours As Needed for wheezing., Disp: 18 g, Rfl: 11  •  buPROPion XL (WELLBUTRIN XL) 150 MG 24 hr tablet, Take 1 tablet by mouth Daily., Disp: 30 tablet, Rfl: 5  •  docusate sodium (COLACE) 100 MG capsule, Take 1 capsule by mouth 2 (Two) Times a Day., Disp: 60 capsule, Rfl: 3  •  HYDROcodone-acetaminophen (NORCO)  MG per tablet, Take 1 tablet by mouth 3 (Three) Times a Day As Needed for Moderate Pain (4-6)., Disp: 90 tablet, Rfl: 0  •  hydrOXYzine (VISTARIL) 25 MG capsule, Take 25 mg by mouth Daily., Disp: , Rfl:   •  loperamide (IMODIUM) 2 MG capsule, Take 1 capsule by mouth 3 (three) times a day., Disp: 180 capsule, Rfl: 1  •  NEXIUM 24HR 20 MG capsule, Take 1 capsule by mouth Daily., Disp: 30 capsule, Rfl: 5  •  Nutritional Supplements (BOOST) liquid, Take 1 can by mouth As Needed., Disp: , Rfl:   •  ondansetron ODT (ZOFRAN-ODT) 4 MG disintegrating tablet, Take 1 tablet by mouth Every 6 (Six) Hours As Needed for nausea or vomiting., Disp: 60 tablet, Rfl: 1  •  sucralfate (CARAFATE) 1 GM/10ML suspension, Take 10 mL by mouth 4 (Four) Times a Day., Disp: 4 g, Rfl: 5    Allergies:  Penicillins    Family Hx:  Family History   Problem Relation Age of Onset   • Ovarian cancer Mother    • Cancer Mother    • Osteoporosis Other    • Heart disease Other    • Diabetes Other    • Cancer Other      Colorectal-Parent   • Asthma Other    • Asthma Sister    • Diabetes Sister    • Hypertension Sister    • Alcohol abuse Brother    • Cancer Maternal Uncle         Social History:  Social History     Social History   • Marital status: Single     Spouse name: N/A   • Number of children: N/A   • Years of education: N/A     Occupational History   • Not on file.     Social History Main Topics   • Smoking status: Former Smoker      "Quit date: 2015   • Smokeless tobacco: Not on file      Comment: any smoking   • Alcohol use No   • Drug use: No   • Sexual activity: Defer     Other Topics Concern   • Not on file     Social History Narrative       Review of Systems  Review of Systems   Constitutional: Negative.  Negative for fatigue and fever.        Chronic aches and pain.   Pain from esophageal surgery.    HENT: Negative.  Negative for ear pain and sore throat.    Eyes: Negative.  Negative for pain and visual disturbance.   Respiratory: Positive for choking (chronic) and shortness of breath. Negative for cough.    Cardiovascular: Negative for chest pain and palpitations.   Gastrointestinal: Positive for diarrhea. Negative for abdominal pain and nausea.   Endocrine: Negative.    Genitourinary: Negative for dysuria.   Musculoskeletal: Negative for arthralgias.        Pain secondary to esophageal cancer. Difficulty sleeping due to Gerd.    Skin: Negative for rash.   Allergic/Immunologic: Negative.    Neurological: Negative for dizziness, weakness and headaches.   Psychiatric/Behavioral: Negative for sleep disturbance.       Objective:     Visit Vitals   • /70 (BP Location: Left arm, Patient Position: Sitting, Cuff Size: Adult)   • Pulse 75   • Ht 63\" (160 cm)   • Wt 110 lb 6.4 oz (50.1 kg)   • LMP 03/17/1989 (Within Months)   • SpO2 98%   • BMI 19.56 kg/m2     Physical exam   General:  alert, appears stated age, cooperative and no distress   Oropharynx: lips, mucosa, and tongue normal; teeth and gums normal    Eyes:  conjunctivae/corneas clear. PERRL, EOM's intact. Fundi benign.    Ears:  normal TM's and external ear canals both ears   Neck: no adenopathy, no carotid bruit, no JVD, supple, symmetrical, trachea midline and thyroid not enlarged, symmetric, no tenderness/mass/nodules   Thyroid:  no palpable nodule   Lung: clear to auscultation bilaterally   Heart:  regular rate and rhythm, S1, S2 normal, no murmur, click, rub or gallop "   Abdomen: soft, non-tender; bowel sounds normal; no masses,  no organomegaly   Extremities: extremities normal, atraumatic, no cyanosis or edema   Skin: warm and dry, no hyperpigmentation, vitiligo, or suspicious lesions   Pulses: 2+ and symmetric   Genito-urinary  Vaginal exam normal, cervix normal. No erythema pr discharged noted.    Neuro: normal without focal findings, mental status, speech normal, alert and oriented x3 and reflexes normal and symmetric       Lab Review  none     Assessment:      Diagnosis Plan   1. Encounter for gynecological examination  Cytology, thin prep pap (cervical)   2. Other chronic postprocedural pain  HYDROcodone-acetaminophen (NORCO)  MG per tablet          Plan:     As part of this patient's treatment plan, I am prescribing controlled substances. The patient has been made aware of appropriate use of such medications, including potential risk of somnolence, limited ability to drive and /or work safely, and potential for dependence or overdose. It has also been made clear that these medications are for use by this patient only, without concomitant use of alcohol or other substances unless prescribed.    Patient has completed prescribing agreement detailing terms of continued prescribing of controlled substances, including monitoring ANNETTE reports, urine drug screening, and pill counts if necessary. The patient is aware that inappropriate use will result in cessation of prescribing such medications.    History and physical exam exhibit continued safe and appropriate use of controlled substances.    1.  Controlled substance abuse agreement discussed and copy in record: yes, prior  2.  Discussed:   Risk of addiction: yes   Specific risk of medications: yes   Side effects of medications: yes   Reasonable expectations of the medication: yes  3.  Treatment Objectives:   Discussed management of constipation: yes   Discussed management of other side effects: yes   4.  eKASPER:      ANNETTE report has been reviewed by: Tiffany Jimenez MD on 03/20/17.  The report was scanned into the patient's chart.   Date of last ANNETTE:  2/20/2017 appropriate   Annette # [id number]   UDS Today again  5.  Results and date of last drug screen: We'll obtain today  6.  Follow up plan:    1 month   Recheck if not improving: yes   Continue on current medications as directed: yes   7.  Adjustments:   We'll increase medication 3 times a day      RISK SCORE: 4          This document has been electronically signed by Tiffany Jimenez MD on March 20, 2017 4:04 PM

## 2017-03-22 LAB
LAB AP CASE REPORT: NORMAL
LAB AP GYN ADDITIONAL INFORMATION: NORMAL
LAB AP GYN OTHER FINDINGS: NORMAL
Lab: NORMAL
PATH INTERP SPEC-IMP: NORMAL
STAT OF ADQ CVX/VAG CYTO-IMP: NORMAL

## 2017-03-31 DIAGNOSIS — C15.9 MALIGNANT NEOPLASM OF ESOPHAGUS, UNSPECIFIED LOCATION (HCC): Primary | ICD-10-CM

## 2017-04-12 DIAGNOSIS — C15.4 MALIGNANT NEOPLASM OF THORACIC ESOPHAGUS (HCC): Primary | ICD-10-CM

## 2017-04-13 ENCOUNTER — APPOINTMENT (OUTPATIENT)
Dept: ONCOLOGY | Facility: HOSPITAL | Age: 57
End: 2017-04-13

## 2017-04-19 VITALS — BODY MASS INDEX: 19.84 KG/M2 | HEIGHT: 63 IN | WEIGHT: 112 LBS

## 2017-04-19 RX ORDER — METOCLOPRAMIDE 10 MG/1
10 TABLET ORAL DAILY
Refills: 0 | COMMUNITY
Start: 2017-02-17 | End: 2017-04-28 | Stop reason: SDUPTHER

## 2017-04-20 ENCOUNTER — OFFICE VISIT (OUTPATIENT)
Dept: FAMILY MEDICINE CLINIC | Facility: CLINIC | Age: 57
End: 2017-04-20

## 2017-04-20 ENCOUNTER — LAB (OUTPATIENT)
Dept: ONCOLOGY | Facility: HOSPITAL | Age: 57
End: 2017-04-20

## 2017-04-20 ENCOUNTER — OFFICE VISIT (OUTPATIENT)
Dept: ONCOLOGY | Facility: CLINIC | Age: 57
End: 2017-04-20

## 2017-04-20 VITALS
BODY MASS INDEX: 19.89 KG/M2 | SYSTOLIC BLOOD PRESSURE: 146 MMHG | OXYGEN SATURATION: 100 % | DIASTOLIC BLOOD PRESSURE: 83 MMHG | TEMPERATURE: 98.1 F | WEIGHT: 112.3 LBS | RESPIRATION RATE: 20 BRPM | HEART RATE: 55 BPM

## 2017-04-20 VITALS
OXYGEN SATURATION: 99 % | HEIGHT: 63 IN | SYSTOLIC BLOOD PRESSURE: 114 MMHG | BODY MASS INDEX: 19.97 KG/M2 | DIASTOLIC BLOOD PRESSURE: 78 MMHG | WEIGHT: 112.7 LBS | HEART RATE: 53 BPM

## 2017-04-20 DIAGNOSIS — R13.10 DYSPHAGIA, UNSPECIFIED TYPE: ICD-10-CM

## 2017-04-20 DIAGNOSIS — G89.28 OTHER CHRONIC POSTPROCEDURAL PAIN: Primary | ICD-10-CM

## 2017-04-20 DIAGNOSIS — C15.9 ESOPHAGEAL CARCINOMA (HCC): Primary | ICD-10-CM

## 2017-04-20 DIAGNOSIS — R13.19 OTHER DYSPHAGIA: ICD-10-CM

## 2017-04-20 DIAGNOSIS — C15.4 MALIGNANT NEOPLASM OF THORACIC ESOPHAGUS (HCC): ICD-10-CM

## 2017-04-20 DIAGNOSIS — R12 HEART BURN: ICD-10-CM

## 2017-04-20 DIAGNOSIS — F33.41 RECURRENT MAJOR DEPRESSIVE DISORDER, IN PARTIAL REMISSION (HCC): ICD-10-CM

## 2017-04-20 DIAGNOSIS — Z11.59 NEED FOR HEPATITIS C SCREENING TEST: ICD-10-CM

## 2017-04-20 LAB
ALBUMIN SERPL-MCNC: 3.8 G/DL (ref 3.4–4.8)
ALBUMIN/GLOB SERPL: 1.2 G/DL (ref 1.1–1.8)
ALP SERPL-CCNC: 73 U/L (ref 38–126)
ALT SERPL W P-5'-P-CCNC: 20 U/L (ref 9–52)
ANION GAP SERPL CALCULATED.3IONS-SCNC: 10 MMOL/L (ref 5–15)
AST SERPL-CCNC: 18 U/L (ref 14–36)
BASOPHILS # BLD AUTO: 0.01 10*3/MM3 (ref 0–0.2)
BASOPHILS NFR BLD AUTO: 0.2 % (ref 0–2)
BILIRUB SERPL-MCNC: 1 MG/DL (ref 0.2–1.3)
BUN BLD-MCNC: 14 MG/DL (ref 7–21)
BUN/CREAT SERPL: 19.2 (ref 7–25)
CALCIUM SPEC-SCNC: 8.6 MG/DL (ref 8.4–10.2)
CHLORIDE SERPL-SCNC: 101 MMOL/L (ref 95–110)
CO2 SERPL-SCNC: 28 MMOL/L (ref 22–31)
CREAT BLD-MCNC: 0.73 MG/DL (ref 0.5–1)
DEPRECATED RDW RBC AUTO: 47.6 FL (ref 36.4–46.3)
EOSINOPHIL # BLD AUTO: 0.05 10*3/MM3 (ref 0–0.7)
EOSINOPHIL NFR BLD AUTO: 0.9 % (ref 0–7)
ERYTHROCYTE [DISTWIDTH] IN BLOOD BY AUTOMATED COUNT: 13.8 % (ref 11.5–14.5)
GFR SERPL CREATININE-BSD FRML MDRD: 99 ML/MIN/1.73 (ref 51–120)
GLOBULIN UR ELPH-MCNC: 3.1 GM/DL (ref 2.3–3.5)
GLUCOSE BLD-MCNC: 102 MG/DL (ref 60–100)
HCT VFR BLD AUTO: 36.4 % (ref 35–45)
HGB BLD-MCNC: 12.6 G/DL (ref 12–15.5)
IMM GRANULOCYTES # BLD: 0 10*3/MM3 (ref 0–0.02)
IMM GRANULOCYTES NFR BLD: 0 % (ref 0–0.5)
LYMPHOCYTES # BLD AUTO: 0.9 10*3/MM3 (ref 0.6–4.2)
LYMPHOCYTES NFR BLD AUTO: 16.8 % (ref 10–50)
MCH RBC QN AUTO: 32.7 PG (ref 26.5–34)
MCHC RBC AUTO-ENTMCNC: 34.6 G/DL (ref 31.4–36)
MCV RBC AUTO: 94.5 FL (ref 80–98)
MONOCYTES # BLD AUTO: 0.55 10*3/MM3 (ref 0–0.9)
MONOCYTES NFR BLD AUTO: 10.2 % (ref 0–12)
NEUTROPHILS # BLD AUTO: 3.86 10*3/MM3 (ref 2–8.6)
NEUTROPHILS NFR BLD AUTO: 71.9 % (ref 37–80)
PLATELET # BLD AUTO: 171 10*3/MM3 (ref 150–450)
PMV BLD AUTO: 10.8 FL (ref 8–12)
POTASSIUM BLD-SCNC: 3.5 MMOL/L (ref 3.5–5.1)
PROT SERPL-MCNC: 6.9 G/DL (ref 6.3–8.6)
RBC # BLD AUTO: 3.85 10*6/MM3 (ref 3.77–5.16)
SODIUM BLD-SCNC: 139 MMOL/L (ref 137–145)
WBC NRBC COR # BLD: 5.37 10*3/MM3 (ref 3.2–9.8)

## 2017-04-20 PROCEDURE — 36415 COLL VENOUS BLD VENIPUNCTURE: CPT | Performed by: INTERNAL MEDICINE

## 2017-04-20 PROCEDURE — 99213 OFFICE O/P EST LOW 20 MIN: CPT | Performed by: FAMILY MEDICINE

## 2017-04-20 PROCEDURE — 99213 OFFICE O/P EST LOW 20 MIN: CPT | Performed by: INTERNAL MEDICINE

## 2017-04-20 PROCEDURE — 85025 COMPLETE CBC W/AUTO DIFF WBC: CPT | Performed by: INTERNAL MEDICINE

## 2017-04-20 PROCEDURE — 80053 COMPREHEN METABOLIC PANEL: CPT | Performed by: INTERNAL MEDICINE

## 2017-04-20 RX ORDER — PROPRANOLOL HYDROCHLORIDE 10 MG/1
40 TABLET ORAL DAILY
Qty: 60 CAPSULE | Refills: 5 | Status: SHIPPED | OUTPATIENT
Start: 2017-04-20 | End: 2018-08-02

## 2017-04-20 RX ORDER — HYDROCODONE BITARTRATE AND ACETAMINOPHEN 10; 325 MG/1; MG/1
1 TABLET ORAL 3 TIMES DAILY PRN
Qty: 90 TABLET | Refills: 0 | Status: SHIPPED | OUTPATIENT
Start: 2017-04-20 | End: 2017-05-19 | Stop reason: SDUPTHER

## 2017-04-20 RX ORDER — BUPROPION HYDROCHLORIDE 150 MG/1
150 TABLET, EXTENDED RELEASE ORAL 2 TIMES DAILY
Qty: 60 TABLET | Refills: 5 | Status: SHIPPED | OUTPATIENT
Start: 2017-04-20 | End: 2017-11-13 | Stop reason: SDUPTHER

## 2017-04-20 RX ORDER — SUCRALFATE ORAL 1 G/10ML
1 SUSPENSION ORAL 4 TIMES DAILY
Qty: 4 G | Refills: 5 | Status: SHIPPED | OUTPATIENT
Start: 2017-04-20 | End: 2017-04-21 | Stop reason: SDUPTHER

## 2017-04-20 NOTE — PROGRESS NOTES
Subjective  patient with squamous cell carcinoma of esophagus.         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  Patient was treated with weekly Taxol and carbo along with radiation on CROSS protocol.  This was followed by surgery on 3/1/2016.  Patient had a pathological CR.  She has required multiple dilatations following surgery for her dysphagia.  She has no new complaints at this time.  Her CBC, CMP are within normal range.   denies any cough or chest pain or shortness of breath.    Past Medical History, Past Surgical History, Social History, Family History have been reviewed and are without significant changes except as mentioned.    Review of Systems   Constitutional: Negative.    HENT: Negative.    Eyes: Negative.    Respiratory: Negative.    Cardiovascular: Negative.    Gastrointestinal:        Dysphagia +   Endocrine: Negative.    Genitourinary: Negative.    Musculoskeletal: Negative.    Skin: Negative.    Allergic/Immunologic: Negative.    Neurological: Negative.    Hematological: Negative.    Psychiatric/Behavioral: Negative.    All other systems reviewed and are negative.     A comprehensive 14 point review of systems was performed and was negative except as mentioned.    Medications:  The current medication list was reviewed in the EMR    ALLERGIES:    Allergies   Allergen Reactions   • Penicillins Hives   • Strawberry C [Ascorbate] Hives       Objective      Vitals:    04/20/17 1209   BP: 146/83   Pulse: 55   Resp: 20   Temp: 98.1 °F (36.7 °C)   TempSrc: Temporal Artery    SpO2: 100%   Weight: 112 lb 4.8 oz (50.9 kg)   PainSc:   7   PainLoc: Back  Comment: and stomach     Current Status 4/20/2017   ECOG score 1       Physical Exam   Constitutional:   malnourished   HENT:   Head: Normocephalic and atraumatic.   Nose: Nose normal.   Mouth/Throat: Oropharynx is clear and moist.    Eyes: Conjunctivae and EOM are normal. Pupils are equal, round, and reactive to light.   Neck: Normal range of motion. Neck supple.   Cardiovascular: Normal rate, regular rhythm, normal heart sounds and intact distal pulses.    Pulmonary/Chest: Effort normal and breath sounds normal.   Abdominal: Soft. Bowel sounds are normal.   Musculoskeletal: Normal range of motion.   Neurological: She is alert.   Skin: Skin is warm.   Psychiatric: She has a normal mood and affect. Her behavior is normal. Judgment and thought content normal.        RECENT LABS:  Hematology WBC   Date Value Ref Range Status   04/20/2017 5.37 3.20 - 9.80 10*3/mm3 Final     RBC   Date Value Ref Range Status   04/20/2017 3.85 3.77 - 5.16 10*6/mm3 Final     Hemoglobin   Date Value Ref Range Status   04/20/2017 12.6 12.0 - 15.5 g/dL Final     Hematocrit   Date Value Ref Range Status   04/20/2017 36.4 35.0 - 45.0 % Final     Platelets   Date Value Ref Range Status   04/20/2017 171 150 - 450 10*3/mm3 Final              Assessment/Plan    1.  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  2.  Dysphagia  3.  Pancytopenia    Patient with CA esophagus currently has no evidence of disease.  Observation is indicated.  Her scans from February 2017 did not show any evidence of residual disease on recurrence.  My plan will be to do scans once in 6 months for the next one year.  Dysphagia patient is undergoing frequent dilatations as per GI.  She is encouraged to follow up with gastric for the same.  Her pancytopenia has resolved.    Patient is advised to follow up in October with a CT chest and abdomen and pelvis, CBC and CMP.  Thanks for allowing us to participate in the care of this patient.                  4/20/2017      CC:

## 2017-04-20 NOTE — PROGRESS NOTES
Subjective:     Maddison Sheridan is a 57 y.o. female who presents for Chronic pain secondary from esophageal cancer s/p surgery.  Patient is doing better. She recently had her esophagus stretched which has helped her symptoms. She is taking Norco TID PRN. She states with this medication, she is able to function. She is not able to do everything that she used to do but is much improved.     History:   Duration of the patient's pain: a few year(s)   Legal issues? no   Current controlled medication and doses: Norco 10/325 TID   Medications that have failed: Vistaril, Benadryl, Ibuprofen, Tylenol   Side effects of current medications: constipation   Satisfaction with treatment: yes    Functional Status:   Able to feed self: yes   Able to bathe self: yes   Able to use the toilet independently: yes   Able to dress self: yes   Able to get up from bed or chair without assistance: yes    Adverse Events:   Constipation: yes   Lethargy: yes   Hypogonadism: no    Aberrant Behavior:   Over dose: no   Run out of medications early: no   Last UDS consistent: yes    Analgesia:   Pain scale prior to meds: 9    Pain scale after meds started: 6   Taking medications as prescribed: yes     The following portions of the patient's history were reviewed and updated as appropriate: allergies, current medications, past family history, past medical history, past social history, past surgical history and problem list.    Preventative:  Over the past 2 weeks, have you felt down, depressed, or hopeless?Yes   Over the past 2 weeks, have you felt little interest or pleasure in doing things?Yes  Clinical depression screening refused by patient.Yes     On osteoporosis therapy?No     Past Medical Hx:  Past Medical History:   Diagnosis Date   • Abnormal weight loss    • Anxiety    • Atrophic vaginitis    • Depression    • Dysphagia     pharyngoesophageal phase      • Encounter for surgical aftercare following surgery of digestive system      Walter Michael (thoracic esophagectomy, lymphadenectomly, Bronchoscopy, Placement on Q. 3/1/16      • Essential hypertension    • History of esophagogastroduodenoscopy     Normal hypopharynx.A tumor was found in the middle third of the esophagus.Mul.biopsies taken.Normal GE junction.Mild nonerosive gastritis in antrum.Biopsies taken.Normal pylorus and duodenum. 09/24/2015       • History of hysteroscopy     Hysteroscopy, dilation and curettage, and repair of vaginal laceration. 08/03/2013       • History of substance abuse    • Lumbosacral strain    • Myopia    • Presbyopia    • Primary malignant neoplasm of thoracic part of esophagus     Squamous cell   • Tobacco dependence     1 ppd       Past Surgical Hx:  Past Surgical History:   Procedure Laterality Date   • ABDOMINAL SURGERY  03/01/2016    Abdominal portion of an [place] Michael esophagogastrectomy with jejunostomy tube placement. Esophageal carcinoma.   • ENDOSCOPY N/A 2/3/2017    Procedure: ESOPHAGOGASTRODUODENOSCOPY;  Surgeon: Babar Connell MD;  Location: [facility] ENDOSCOPY;  Service:    • ESOPHAGUS SURGERY      Thoracic esophagectomy (Walter Michael type).Thoracic lymphadenectomy.Fiberoptic bronchoscopy.Placement of On Q pain pump.Jejunostomy tube placement.Pyloromyotomy. 03/01/2016      • LUMBAR DISC SURGERY     • UPPER GASTROINTESTINAL ENDOSCOPY  02/03/2017       Health Maintenance:  Health Maintenance   Topic Date Due   • HEPATITIS C SCREENING  08/24/2016   • MAMMOGRAM  08/24/2018   • PAP SMEAR  03/20/2020   • TDAP/TD VACCINES (2 - Td) 01/01/2023   • COLONOSCOPY  08/24/2026   • INFLUENZA VACCINE  Addressed       Current Meds:    Current Outpatient Prescriptions:   •  albuterol (PROVENTIL HFA;VENTOLIN HFA) 108 (90 BASE) MCG/ACT inhaler, Inhale 2 puffs Every 4 (Four) Hours As Needed for wheezing., Disp: 18 g, Rfl: 11  •  buPROPion XL (WELLBUTRIN XL) 150 MG 24 hr tablet, Take 1 tablet by mouth Daily., Disp: 30 tablet, Rfl: 5  •  docusate sodium (COLACE) 100 MG capsule,  Take 1 capsule by mouth 2 (Two) Times a Day., Disp: 60 capsule, Rfl: 3  •  HYDROcodone-acetaminophen (NORCO)  MG per tablet, Take 1 tablet by mouth 3 (Three) Times a Day As Needed for Moderate Pain (4-6)., Disp: 90 tablet, Rfl: 0  •  hydrOXYzine (VISTARIL) 25 MG capsule, Take 25 mg by mouth Daily., Disp: , Rfl:   •  loperamide (IMODIUM) 2 MG capsule, Take 1 capsule by mouth 3 (three) times a day., Disp: 180 capsule, Rfl: 1  •  metoclopramide (REGLAN) 10 MG tablet, Take 10 mg by mouth Daily., Disp: , Rfl: 0  •  NEXIUM 24HR 20 MG capsule, Take 2 capsules by mouth Daily., Disp: 60 capsule, Rfl: 5  •  Nutritional Supplements (BOOST) liquid, Take 1 can by mouth As Needed., Disp: , Rfl:   •  ondansetron ODT (ZOFRAN-ODT) 4 MG disintegrating tablet, Take 1 tablet by mouth Every 6 (Six) Hours As Needed for nausea or vomiting., Disp: 60 tablet, Rfl: 1  •  sucralfate (CARAFATE) 1 GM/10ML suspension, Take 10 mL by mouth 4 (Four) Times a Day., Disp: 4 g, Rfl: 5  •  buPROPion SR (WELLBUTRIN SR) 150 MG 12 hr tablet, Take 1 tablet by mouth 2 (Two) Times a Day., Disp: 60 tablet, Rfl: 5    Allergies:  Penicillins and Strawberry c [ascorbate]    Family Hx:  Family History   Problem Relation Age of Onset   • Ovarian cancer Mother    • Cancer Mother    • Osteoporosis Other    • Heart disease Other    • Diabetes Other    • Cancer Other      Colorectal-Parent   • Asthma Other    • Asthma Sister    • Diabetes Sister    • Hypertension Sister    • Alcohol abuse Brother    • Cancer Maternal Uncle         Social History:  Social History     Social History   • Marital status: Single     Spouse name: N/A   • Number of children: N/A   • Years of education: N/A     Occupational History   • Not on file.     Social History Main Topics   • Smoking status: Former Smoker     Quit date: 2015   • Smokeless tobacco: Not on file      Comment: any smoking   • Alcohol use No   • Drug use: No   • Sexual activity: Defer     Other Topics Concern   • Not  "on file     Social History Narrative       Review of Systems  Review of Systems   Constitutional: Negative.  Negative for fatigue and fever.   HENT: Negative.  Negative for ear pain and sore throat.    Eyes: Negative.  Negative for pain and visual disturbance.   Respiratory: Negative for cough and shortness of breath.    Cardiovascular: Negative.  Negative for chest pain and palpitations.   Gastrointestinal: Negative for abdominal pain and nausea.   Endocrine: Negative.    Genitourinary: Negative for dysuria.   Musculoskeletal: Negative for arthralgias.        Pain secondary to esophageal cancer. Difficulty sleeping due to Gerd.    Skin: Negative for rash.   Allergic/Immunologic: Negative.    Neurological: Negative for dizziness, weakness and headaches.   Psychiatric/Behavioral: Negative for sleep disturbance.       Objective:     /78  Pulse 53  Ht 63\" (160 cm)  Wt 112 lb 11.2 oz (51.1 kg)  LMP 03/17/1989 (Within Months)  SpO2 99%  BMI 19.96 kg/m2  Physical exam   General:  alert, appears stated age, cooperative and no distress   Oropharynx: lips, mucosa, and tongue normal; teeth and gums normal    Eyes:  conjunctivae/corneas clear. PERRL, EOM's intact. Fundi benign.    Ears:  normal external ear canals both ears   Neck: no adenopathy, no carotid bruit, no JVD, supple, symmetrical, trachea midline and thyroid not enlarged, symmetric, no tenderness/mass/nodules   Thyroid:  no palpable nodule   Lung: clear to auscultation bilaterally   Heart:  regular rate and rhythm, S1, S2 normal, no murmur, click, rub or gallop   Abdomen: soft, non-tender; bowel sounds normal; no masses,  no organomegaly   Extremities: extremities normal, atraumatic, no cyanosis or edema   Skin: warm and dry, no hyperpigmentation, vitiligo, or suspicious lesions   Pulses: 2+ and symmetric   Neuro: normal without focal findings, mental status, speech normal, alert and oriented x3 and reflexes normal and symmetric       Lab Review  none   "   Assessment:      Diagnosis Plan   1. Other chronic postprocedural pain  HYDROcodone-acetaminophen (NORCO)  MG per tablet   2. Heart burn  NEXIUM 24HR 20 MG capsule   3. Malignant neoplasm of thoracic esophagus     4. Dysphagia, unspecified type  sucralfate (CARAFATE) 1 GM/10ML suspension   5. Recurrent major depressive disorder, in partial remission  buPROPion SR (WELLBUTRIN SR) 150 MG 12 hr tablet   6. Need for hepatitis C screening test  Hepatitis C Antibody        Plan:     As part of this patient's treatment plan, I am prescribing controlled substances. The patient has been made aware of appropriate use of such medications, including potential risk of somnolence, limited ability to drive and /or work safely, and potential for dependence or overdose. It has also been made clear that these medications are for use by this patient only, without concomitant use of alcohol or other substances unless prescribed.    Patient has completed prescribing agreement detailing terms of continued prescribing of controlled substances, including monitoring ANNETTE reports, urine drug screening, and pill counts if necessary. The patient is aware that inappropriate use will result in cessation of prescribing such medications.    History and physical exam exhibit continued safe and appropriate use of controlled substances.    1.  Controlled substance abuse agreement discussed and copy in record: yes, prior  2.  Discussed:   Risk of addiction: yes   Specific risk of medications: yes   Side effects of medications: yes   Reasonable expectations of the medication: yes  3.  Treatment Objectives:   Discussed management of constipation: yes   Discussed management of other side effects: yes   4.  eKASPER:     ANNETTE report has been reviewed by: Tiffany Jimenez MD on 04/20/17.  The report was scanned into the patient's chart.   Date of last ANNETTE:  4/20/2017 appropriate   Annette # [id number]   UDS in Feb. Will Need one next month  5.   Results and date of last drug screen: Feb. Appropriate  6.  Follow up plan:    1 month   Recheck if not improving: yes   Continue on current medications as directed: yes   7.  Adjustments:   Will continue current medication       RISK SCORE: 4          This document has been electronically signed by Tiffany Jimenez MD on April 20, 2017 9:35 AM

## 2017-04-20 NOTE — PROGRESS NOTES
I have seen the patient.  I have reviewed the notes, assessments, and/or procedures performed by Tiffany Jimenez MD , I concur with her/his documentation and assessment and plan for Maddisonjulia Powellauroraosman Arvin.          This document has been electronically signed by Sheree Bailon MD on April 20, 2017 9:39 AM

## 2017-04-21 ENCOUNTER — TELEPHONE (OUTPATIENT)
Dept: FAMILY MEDICINE CLINIC | Facility: CLINIC | Age: 57
End: 2017-04-21

## 2017-04-21 DIAGNOSIS — R13.10 DYSPHAGIA, UNSPECIFIED TYPE: ICD-10-CM

## 2017-04-21 RX ORDER — SUCRALFATE ORAL 1 G/10ML
1 SUSPENSION ORAL 4 TIMES DAILY
Qty: 420 ML | Refills: 6 | Status: SHIPPED | OUTPATIENT
Start: 2017-04-21 | End: 2017-12-06

## 2017-04-21 NOTE — TELEPHONE ENCOUNTER
----- Message from Keanu Colin sent at 4/20/2017 12:25 PM CDT -----  Regarding: PRESCRIPTION CLARIFICATION  [facility] is calling regarding a Prescription Clarification on this PT.  Medication: CARAFATE SUSPENSION    Clarification: Need clarification on quantity    Please contact pharmacy at your earliest convenience regarding this matter.    Phone #: [phone]    Thanks.

## 2017-04-21 NOTE — TELEPHONE ENCOUNTER
The quantity on this prescription was wrong. It has been corrected.     Tiffany Jimenez M.D.  Family Medicine Resident, PGY II        This document has been electronically signed by Tiffany Jimenez MD on April 21, 2017 8:08 AM

## 2017-04-22 DIAGNOSIS — R11.2 NAUSEA AND VOMITING, INTRACTABILITY OF VOMITING NOT SPECIFIED, UNSPECIFIED VOMITING TYPE: ICD-10-CM

## 2017-04-24 RX ORDER — METOCLOPRAMIDE 10 MG/1
TABLET ORAL
Qty: 336 TABLET | Refills: 0 | OUTPATIENT
Start: 2017-04-24

## 2017-04-26 ENCOUNTER — HOSPITAL ENCOUNTER (OUTPATIENT)
Facility: HOSPITAL | Age: 57
Setting detail: HOSPITAL OUTPATIENT SURGERY
Discharge: HOME | End: 2017-04-26
Attending: INTERNAL MEDICINE | Admitting: INTERNAL MEDICINE

## 2017-04-26 DIAGNOSIS — R13.19 OTHER DYSPHAGIA: Primary | ICD-10-CM

## 2017-04-26 PROCEDURE — G0463 HOSPITAL OUTPT CLINIC VISIT: HCPCS | Performed by: INTERNAL MEDICINE

## 2017-04-26 RX ORDER — DEXTROSE AND SODIUM CHLORIDE 5; .45 G/100ML; G/100ML
30 INJECTION, SOLUTION INTRAVENOUS CONTINUOUS PRN
Status: CANCELLED | OUTPATIENT
Start: 2017-05-05

## 2017-04-26 RX ORDER — SODIUM CHLORIDE 0.9 % (FLUSH) 0.9 %
1-10 SYRINGE (ML) INJECTION AS NEEDED
Status: CANCELLED | OUTPATIENT
Start: 2017-04-26

## 2017-05-01 RX ORDER — HYDROXYZINE PAMOATE 25 MG/1
CAPSULE ORAL
Qty: 90 CAPSULE | Refills: 6 | Status: SHIPPED | OUTPATIENT
Start: 2017-05-01 | End: 2017-06-30

## 2017-05-01 RX ORDER — METOCLOPRAMIDE 10 MG/1
TABLET ORAL
Qty: 336 TABLET | Refills: 6 | Status: SHIPPED | OUTPATIENT
Start: 2017-05-01 | End: 2017-06-30

## 2017-05-01 RX ORDER — LOPERAMIDE HYDROCHLORIDE 2 MG/1
CAPSULE ORAL
Qty: 180 CAPSULE | Refills: 6 | Status: SHIPPED | OUTPATIENT
Start: 2017-05-01 | End: 2017-06-30

## 2017-05-19 ENCOUNTER — OFFICE VISIT (OUTPATIENT)
Dept: FAMILY MEDICINE CLINIC | Facility: CLINIC | Age: 57
End: 2017-05-19

## 2017-05-19 ENCOUNTER — APPOINTMENT (OUTPATIENT)
Dept: LAB | Facility: HOSPITAL | Age: 57
End: 2017-05-19

## 2017-05-19 VITALS
WEIGHT: 112.3 LBS | BODY MASS INDEX: 19.17 KG/M2 | HEIGHT: 64 IN | OXYGEN SATURATION: 98 % | HEART RATE: 64 BPM | SYSTOLIC BLOOD PRESSURE: 140 MMHG | DIASTOLIC BLOOD PRESSURE: 72 MMHG

## 2017-05-19 DIAGNOSIS — Z51.81 THERAPEUTIC DRUG MONITORING: ICD-10-CM

## 2017-05-19 DIAGNOSIS — G89.28 OTHER CHRONIC POSTPROCEDURAL PAIN: Primary | ICD-10-CM

## 2017-05-19 PROCEDURE — 80307 DRUG TEST PRSMV CHEM ANLYZR: CPT | Performed by: FAMILY MEDICINE

## 2017-05-19 PROCEDURE — G0480 DRUG TEST DEF 1-7 CLASSES: HCPCS

## 2017-05-19 PROCEDURE — 99213 OFFICE O/P EST LOW 20 MIN: CPT | Performed by: FAMILY MEDICINE

## 2017-05-19 RX ORDER — HYDROCODONE BITARTRATE AND ACETAMINOPHEN 10; 325 MG/1; MG/1
1 TABLET ORAL 3 TIMES DAILY PRN
Qty: 90 TABLET | Refills: 0 | Status: SHIPPED | OUTPATIENT
Start: 2017-05-19 | End: 2017-07-07 | Stop reason: ALTCHOICE

## 2017-05-22 ENCOUNTER — ANESTHESIA (OUTPATIENT)
Dept: GASTROENTEROLOGY | Facility: HOSPITAL | Age: 57
End: 2017-05-22

## 2017-05-22 ENCOUNTER — HOSPITAL ENCOUNTER (OUTPATIENT)
Facility: HOSPITAL | Age: 57
Setting detail: HOSPITAL OUTPATIENT SURGERY
Discharge: HOME | End: 2017-05-22
Attending: INTERNAL MEDICINE | Admitting: INTERNAL MEDICINE

## 2017-05-22 ENCOUNTER — ANESTHESIA EVENT (OUTPATIENT)
Dept: GASTROENTEROLOGY | Facility: HOSPITAL | Age: 57
End: 2017-05-22

## 2017-05-22 VITALS
OXYGEN SATURATION: 99 % | SYSTOLIC BLOOD PRESSURE: 133 MMHG | BODY MASS INDEX: 19.65 KG/M2 | TEMPERATURE: 96.8 F | HEART RATE: 73 BPM | DIASTOLIC BLOOD PRESSURE: 85 MMHG | WEIGHT: 110.89 LBS | RESPIRATION RATE: 18 BRPM | HEIGHT: 63 IN

## 2017-05-22 PROCEDURE — 43235 EGD DIAGNOSTIC BRUSH WASH: CPT | Performed by: INTERNAL MEDICINE

## 2017-05-22 PROCEDURE — 25010000002 PROPOFOL 10 MG/ML EMULSION: Performed by: NURSE ANESTHETIST, CERTIFIED REGISTERED

## 2017-05-22 RX ORDER — PROMETHAZINE HYDROCHLORIDE 25 MG/ML
12.5 INJECTION, SOLUTION INTRAMUSCULAR; INTRAVENOUS ONCE AS NEEDED
Status: DISCONTINUED | OUTPATIENT
Start: 2017-05-22 | End: 2017-05-22 | Stop reason: HOSPADM

## 2017-05-22 RX ORDER — PROMETHAZINE HYDROCHLORIDE 25 MG/1
25 SUPPOSITORY RECTAL ONCE AS NEEDED
Status: DISCONTINUED | OUTPATIENT
Start: 2017-05-22 | End: 2017-05-22 | Stop reason: HOSPADM

## 2017-05-22 RX ORDER — ONDANSETRON 2 MG/ML
4 INJECTION INTRAMUSCULAR; INTRAVENOUS ONCE AS NEEDED
Status: DISCONTINUED | OUTPATIENT
Start: 2017-05-22 | End: 2017-05-22 | Stop reason: HOSPADM

## 2017-05-22 RX ORDER — DEXAMETHASONE SODIUM PHOSPHATE 4 MG/ML
8 INJECTION, SOLUTION INTRA-ARTICULAR; INTRALESIONAL; INTRAMUSCULAR; INTRAVENOUS; SOFT TISSUE ONCE AS NEEDED
Status: DISCONTINUED | OUTPATIENT
Start: 2017-05-22 | End: 2017-05-22 | Stop reason: HOSPADM

## 2017-05-22 RX ORDER — LIDOCAINE HYDROCHLORIDE 10 MG/ML
INJECTION, SOLUTION INFILTRATION; PERINEURAL AS NEEDED
Status: DISCONTINUED | OUTPATIENT
Start: 2017-05-22 | End: 2017-05-22 | Stop reason: SURG

## 2017-05-22 RX ORDER — PROPOFOL 10 MG/ML
VIAL (ML) INTRAVENOUS AS NEEDED
Status: DISCONTINUED | OUTPATIENT
Start: 2017-05-22 | End: 2017-05-22 | Stop reason: SURG

## 2017-05-22 RX ORDER — PROMETHAZINE HYDROCHLORIDE 25 MG/1
25 TABLET ORAL ONCE AS NEEDED
Status: DISCONTINUED | OUTPATIENT
Start: 2017-05-22 | End: 2017-05-22 | Stop reason: HOSPADM

## 2017-05-22 RX ORDER — DEXTROSE AND SODIUM CHLORIDE 5; .45 G/100ML; G/100ML
20 INJECTION, SOLUTION INTRAVENOUS CONTINUOUS
Status: DISCONTINUED | OUTPATIENT
Start: 2017-05-22 | End: 2017-05-22 | Stop reason: HOSPADM

## 2017-05-22 RX ADMIN — PROPOFOL 60 MG: 10 INJECTION, EMULSION INTRAVENOUS at 10:41

## 2017-05-22 RX ADMIN — DEXTROSE AND SODIUM CHLORIDE 20 ML/HR: 5; 450 INJECTION, SOLUTION INTRAVENOUS at 10:19

## 2017-05-22 RX ADMIN — LIDOCAINE HYDROCHLORIDE 50 MG: 10 INJECTION, SOLUTION INFILTRATION; PERINEURAL at 10:41

## 2017-05-23 LAB
AMPHETAMINES UR QL SCN: NEGATIVE NG/ML
BARBITURATES UR QL SCN: NEGATIVE NG/ML
BENZODIAZ UR QL SCN: NEGATIVE NG/ML
BZE UR QL SCN: POSITIVE
CANNABINOIDS UR QL SCN: NEGATIVE NG/ML
CREAT 24H UR-MCNC: 135.9 MG/DL (ref 20–300)
FENTANYL+NORFENTANYL UR QL SCN: NEGATIVE PG/ML
Lab: ABNORMAL
MEPERIDINE UR CFM-MCNC: NEGATIVE NG/ML
METHADONE UR QL SCN: NEGATIVE NG/ML
OPIATES TESTED UR SCN: NEGATIVE NG/ML
OXYCODONE/OXYMORPHONE, URINE: NEGATIVE NG/ML
PCP UR QL: NEGATIVE NG/ML
PH UR STRIP.AUTO: 8.8 [PH] (ref 4.5–8.9)
PROPOXYPH UR QL SCN: NEGATIVE NG/ML
SP GR UR: 1.03
TRAMADOL UR QL SCN: NEGATIVE NG/ML

## 2017-05-24 LAB — SPECIMEN STATUS: NORMAL

## 2017-05-29 LAB
HYDROCODONE UR CFM-MCNC: 497 NG/ML
HYDROCODONE UR QL: POSITIVE
HYDROMORPHONE SERPL-MCNC: NEGATIVE NG/ML
Lab: ABNORMAL
MORPHINE UR QL: NEGATIVE
OPIATES UR QL SCN: POSITIVE NG/ML

## 2017-06-06 ENCOUNTER — APPOINTMENT (OUTPATIENT)
Dept: CT IMAGING | Facility: HOSPITAL | Age: 57
End: 2017-06-06

## 2017-06-13 ENCOUNTER — OFFICE VISIT (OUTPATIENT)
Dept: GASTROENTEROLOGY | Facility: CLINIC | Age: 57
End: 2017-06-13

## 2017-06-13 VITALS
WEIGHT: 110.7 LBS | HEART RATE: 65 BPM | SYSTOLIC BLOOD PRESSURE: 179 MMHG | BODY MASS INDEX: 19.61 KG/M2 | HEIGHT: 63 IN | DIASTOLIC BLOOD PRESSURE: 96 MMHG

## 2017-06-13 DIAGNOSIS — R13.10 DYSPHAGIA, UNSPECIFIED TYPE: Primary | ICD-10-CM

## 2017-06-13 PROCEDURE — 99213 OFFICE O/P EST LOW 20 MIN: CPT | Performed by: INTERNAL MEDICINE

## 2017-06-13 RX ORDER — DEXTROSE AND SODIUM CHLORIDE 5; .45 G/100ML; G/100ML
30 INJECTION, SOLUTION INTRAVENOUS CONTINUOUS PRN
Status: CANCELLED | OUTPATIENT
Start: 2017-07-10

## 2017-06-13 RX ORDER — SODIUM CHLORIDE 0.9 % (FLUSH) 0.9 %
1-10 SYRINGE (ML) INJECTION AS NEEDED
Status: CANCELLED | OUTPATIENT
Start: 2017-06-13

## 2017-06-13 NOTE — PROGRESS NOTES
[facility]      Chief Complaint:   Chief Complaint   Patient presents with   • Difficulty Swallowing     3 month follow up   • EGD     Follow up from 05/22/2017   • Abdominal Pain       Subjective     HPI:   Patient with episodes of dysphagia.  Patient underwent EGD and showed a large amount of food in the stomach.  Patient states that it been approximately 8 hours since she had eaten.  Patient may have been an element of gastroparesis versus her esophagus.  We'll have patient stop her eating for 24 hours prior to procedure.    Plan; we'll schedule patient for EGD with dilatation.  If food is still in the stomach at 24 hours on clear liquid will need to do motility studies.    Past Medical History:   Past Medical History:   Diagnosis Date   • Abnormal weight loss    • Anxiety    • Atrophic vaginitis    • Depression    • Dysphagia     pharyngoesophageal phase      • Encounter for surgical aftercare following surgery of digestive system     Walter Michael (thoracic esophagectomy, lymphadenectomly, Bronchoscopy, Placement on Q. 3/1/16      • Essential hypertension    • History of esophagogastroduodenoscopy     Normal hypopharynx.A tumor was found in the middle third of the esophagus.Mul.biopsies taken.Normal GE junction.Mild nonerosive gastritis in antrum.Biopsies taken.Normal pylorus and duodenum. 09/24/2015       • History of hysteroscopy     Hysteroscopy, dilation and curettage, and repair of vaginal laceration. 08/03/2013       • History of substance abuse    • Lumbosacral strain    • Myopia    • Presbyopia    • Primary malignant neoplasm of thoracic part of esophagus     Squamous cell   • Tobacco dependence     1 ppd       Family History:  Family History   Problem Relation Age of Onset   • Ovarian cancer Mother    • Cancer Mother    • Osteoporosis Other    • Heart disease Other    • Diabetes Other    • Cancer Other      Colorectal-Parent   • Asthma Other    • Asthma Sister    • Diabetes Sister     • Hypertension Sister    • Alcohol abuse Brother    • Cancer Maternal Uncle        Social History:   reports that she quit smoking about 2 years ago. She has never used smokeless tobacco. She reports that she does not drink alcohol or use illicit drugs.    Medications:   Current Outpatient Prescriptions   Medication Sig Dispense Refill   • albuterol (PROVENTIL HFA;VENTOLIN HFA) 108 (90 BASE) MCG/ACT inhaler Inhale 2 puffs Every 4 (Four) Hours As Needed for wheezing. 18 g 11   • buPROPion SR (WELLBUTRIN SR) 150 MG 12 hr tablet Take 1 tablet by mouth 2 (Two) Times a Day. 60 tablet 5   • docusate sodium (COLACE) 100 MG capsule Take 1 capsule by mouth 2 (Two) Times a Day. 60 capsule 3   • HYDROcodone-acetaminophen (NORCO)  MG per tablet Take 1 tablet by mouth 3 (Three) Times a Day As Needed for Moderate Pain (4-6). 90 tablet 0   • hydrOXYzine (VISTARIL) 25 MG capsule TAKE 1 CAPSULE AT BEDTIME 90 capsule 6   • loperamide (IMODIUM) 2 MG capsule TAKE 1 CAPSULE THREE TIMES DAILY 180 capsule 6   • metoclopramide (REGLAN) 10 MG tablet TAKE 1 TABLET FOUR TIMES DAILY 336 tablet 6   • NEXIUM 24HR 20 MG capsule Take 2 capsules by mouth Daily. 60 capsule 5   • Nutritional Supplements (BOOST) liquid Take 1 can by mouth As Needed.     • ondansetron ODT (ZOFRAN-ODT) 4 MG disintegrating tablet Take 1 tablet by mouth Every 6 (Six) Hours As Needed for nausea or vomiting. 60 tablet 1   • sucralfate (CARAFATE) 1 GM/10ML suspension Take 10 mL by mouth 4 (Four) Times a Day. 420 mL 6     No current facility-administered medications for this visit.        Allergies:  Penicillins and Strawberry c [ascorbate]    ROS:    Review of Systems   Constitutional: Negative for activity change, appetite change, chills, diaphoresis, fatigue, fever and unexpected weight change.   HENT: Negative for sore throat and trouble swallowing.    Respiratory: Negative for shortness of breath.    Gastrointestinal: Negative for abdominal distention, abdominal  "pain, anal bleeding, blood in stool, constipation, diarrhea, nausea, rectal pain and vomiting.   Musculoskeletal: Negative for arthralgias.   Skin: Negative for pallor.   Neurological: Negative for light-headedness.     Objective     Blood pressure 179/96, pulse 65, height 63\" (160 cm), weight 110 lb 11.2 oz (50.2 kg), last menstrual period 03/17/1989, not currently breastfeeding.    Physical Exam   Constitutional: She is oriented to person, place, and time. She appears well-developed and well-nourished. No distress.   HENT:   Head: Normocephalic and atraumatic.   Cardiovascular: Normal rate, regular rhythm, normal heart sounds and intact distal pulses.  Exam reveals no gallop and no friction rub.    No murmur heard.  Pulmonary/Chest: Breath sounds normal. No respiratory distress. She has no wheezes. She has no rales. She exhibits no tenderness.   Abdominal: Soft. Bowel sounds are normal. She exhibits no distension and no mass. There is no tenderness. There is no rebound and no guarding. No hernia.   Musculoskeletal: Normal range of motion. She exhibits no edema.   Neurological: She is alert and oriented to person, place, and time.   Skin: Skin is warm and dry. No rash noted. She is not diaphoretic. No erythema. No pallor.   Psychiatric: She has a normal mood and affect. Her behavior is normal. Judgment and thought content normal.        Assessment/Plan   Maddison was seen today for difficulty swallowing, egd and abdominal pain.    Diagnoses and all orders for this visit:    Dysphagia, unspecified type  -     Case Request; Standing  -     sodium chloride 0.9 % flush 1-10 mL; Infuse 1-10 mL into a venous catheter As Needed for Line Care.  -     dextrose 5 % and sodium chloride 0.45 % infusion; Infuse 30 mL/hr into a venous catheter Continuous As Needed (Start Prior to Procedure).  -     Case Request    Other orders  -     Implement Anesthesia Orders Day of Procedure; Standing  -     Obtain Informed Consent; Standing  - "     Insert Peripheral IV; Standing  -     Saline Lock & Maintain IV Access; Standing      ESOPHAGOGASTRODUODENOSCOPY (N/A)     Diagnosis Plan   1. Dysphagia, unspecified type  Case Request    sodium chloride 0.9 % flush 1-10 mL    dextrose 5 % and sodium chloride 0.45 % infusion    Case Request       Anticipated Surgical Procedure:  No orders of the defined types were placed in this encounter.      The risks, benefits, and alternatives of this procedure have been discussed with the patient or the responsible party- the patient understands and agrees to proceed.

## 2017-06-28 ENCOUNTER — APPOINTMENT (OUTPATIENT)
Dept: CT IMAGING | Facility: HOSPITAL | Age: 57
End: 2017-06-28

## 2017-07-06 ENCOUNTER — HOSPITAL ENCOUNTER (OUTPATIENT)
Dept: CT IMAGING | Facility: HOSPITAL | Age: 57
Discharge: HOME | End: 2017-07-06
Admitting: THORACIC SURGERY (CARDIOTHORACIC VASCULAR SURGERY)

## 2017-07-06 DIAGNOSIS — C15.4 MALIGNANT NEOPLASM OF THORACIC ESOPHAGUS (HCC): ICD-10-CM

## 2017-07-06 PROCEDURE — 71260 CT THORAX DX C+: CPT

## 2017-07-06 PROCEDURE — 74160 CT ABDOMEN W/CONTRAST: CPT

## 2017-07-06 PROCEDURE — 0 IOPAMIDOL 61 % SOLUTION: Performed by: THORACIC SURGERY (CARDIOTHORACIC VASCULAR SURGERY)

## 2017-07-06 RX ADMIN — IOPAMIDOL 75 ML: 612 INJECTION, SOLUTION INTRAVENOUS at 15:18

## 2017-07-07 ENCOUNTER — OFFICE VISIT (OUTPATIENT)
Dept: FAMILY MEDICINE CLINIC | Facility: CLINIC | Age: 57
End: 2017-07-07

## 2017-07-07 VITALS
OXYGEN SATURATION: 97 % | DIASTOLIC BLOOD PRESSURE: 88 MMHG | WEIGHT: 108.9 LBS | HEART RATE: 88 BPM | SYSTOLIC BLOOD PRESSURE: 128 MMHG | BODY MASS INDEX: 19.3 KG/M2 | HEIGHT: 63 IN

## 2017-07-07 DIAGNOSIS — F51.01 PRIMARY INSOMNIA: Primary | ICD-10-CM

## 2017-07-07 PROCEDURE — 99213 OFFICE O/P EST LOW 20 MIN: CPT | Performed by: FAMILY MEDICINE

## 2017-07-07 RX ORDER — HYDROXYZINE PAMOATE 25 MG/1
25 CAPSULE ORAL NIGHTLY
Qty: 60 CAPSULE | Refills: 2 | Status: SHIPPED | OUTPATIENT
Start: 2017-07-07 | End: 2017-12-06

## 2017-07-07 NOTE — PROGRESS NOTES
Subjective:     Maddison Sheridan is a 57 y.o. female who presents for follow up for insomnia.  Insomnia  several months ago. Patient describes symptoms as frequent night time awakening and difficulty falling asleep. Patient has found moderate relief with prescription sleep aid, on vistaril . Associated symptoms include: none. Patient denies anxiety, frequent nighttime urination, leg cramps and snoring. Symptoms have been well-controlled.    Patient has a history of esophageal cancer. She has a repeat CT scan coming up. A recent scope showed no signs of malignancy. She also has an EGD for esophageal stretching. Patient was on Norco. She had 2 positive urine drug screens for cocaine. Discussed that I would no longer be able to prescribe the medication. She stated she understands.     Preventative:  Over the past 2 weeks, have you felt down, depressed, or hopeless?No   Over the past 2 weeks, have you felt little interest or pleasure in doing things?No  Clinical depression screening refused by patient.No     On osteoporosis therapy?No     Past Medical Hx:  Past Medical History:   Diagnosis Date   • Abnormal weight loss    • Anxiety    • Atrophic vaginitis    • Depression    • Dysphagia     pharyngoesophageal phase      • Encounter for surgical aftercare following surgery of digestive system     Nimitz Michael (thoracic esophagectomy, lymphadenectomly, Bronchoscopy, Placement on Q. 3/1/16      • Essential hypertension    • History of esophagogastroduodenoscopy     Normal hypopharynx.A tumor was found in the middle third of the esophagus.Mul.biopsies taken.Normal GE junction.Mild nonerosive gastritis in antrum.Biopsies taken.Normal pylorus and duodenum. 09/24/2015       • History of hysteroscopy     Hysteroscopy, dilation and curettage, and repair of vaginal laceration. 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 Esophageal carcinoma.   • ENDOSCOPY N/A 2/3/2017    Procedure: ESOPHAGOGASTRODUODENOSCOPY;  Surgeon: Babar Connell MD;  Location: [facility] ENDOSCOPY;  Service:    • ENDOSCOPY N/A 5/22/2017    Procedure: ESOPHAGOGASTRODUODENOSCOPY;  Surgeon: Babar Connell MD;  Location: [facility] ENDOSCOPY;  Service:    • ESOPHAGUS SURGERY      Thoracic esophagectomy (Walter Michael type).Thoracic lymphadenectomy.Fiberoptic bronchoscopy.Placement of On Q pain pump.Jejunostomy tube placement.Pyloromyotomy. 03/01/2016      • LUMBAR DISC SURGERY     • UPPER GASTROINTESTINAL ENDOSCOPY  02/03/2017   • UPPER GASTROINTESTINAL ENDOSCOPY  05/22/2017       Health Maintenance:  Health Maintenance   Topic Date Due   • INFLUENZA VACCINE  08/01/2017   • MAMMOGRAM  08/24/2018   • PAP SMEAR  03/20/2020   • TDAP/TD VACCINES (2 - Td) 01/01/2023   • COLONOSCOPY  08/24/2026   • HEPATITIS C SCREENING  Completed       Current Meds:    Current Outpatient Prescriptions:   •  albuterol (PROVENTIL HFA;VENTOLIN HFA) 108 (90 BASE) MCG/ACT inhaler, Inhale 2 puffs Every 4 (Four) Hours As Needed for wheezing., Disp: 18 g, Rfl: 11  •  buPROPion SR (WELLBUTRIN SR) 150 MG 12 hr tablet, Take 1 tablet by mouth 2 (Two) Times a Day., Disp: 60 tablet, Rfl: 5  •  docusate sodium (COLACE) 100 MG capsule, Take 1 capsule by mouth 2 (Two) Times a Day., Disp: 60 capsule, Rfl: 3  •  HYDROcodone-acetaminophen (NORCO)  MG per tablet, Take 1 tablet by mouth 3 (Three) Times a Day As Needed for Moderate Pain (4-6)., Disp: 90 tablet, Rfl: 0  •  hydrOXYzine (VISTARIL) 25 MG capsule, Take 25 mg by mouth Every Night., Disp: , Rfl:   •  loperamide (IMODIUM A-D) 2 MG tablet, Take 2 mg by mouth 3 (Three) Times a Day As Needed for  Diarrhea., Disp: , Rfl:   •  metoclopramide (REGLAN) 10 MG tablet, Take 10 mg by mouth 4 (Four) Times a Day., Disp: , Rfl:   •  NEXIUM 24HR 20 MG capsule, Take 2 capsules by mouth Daily., Disp: 60 capsule, Rfl: 5  •  Nutritional Supplements (BOOST) liquid, Take 1 can by mouth As Needed., Disp: , Rfl:   •  ondansetron ODT (ZOFRAN-ODT) 4 MG disintegrating tablet, Take 1 tablet by mouth Every 6 (Six) Hours As Needed for nausea or vomiting., Disp: 60 tablet, Rfl: 1  •  sucralfate (CARAFATE) 1 GM/10ML suspension, Take 10 mL by mouth 4 (Four) Times a Day., Disp: 420 mL, Rfl: 6    Allergies:  Penicillins and Strawberry c [ascorbate]    Family Hx:  Family History   Problem Relation Age of Onset   • Ovarian cancer Mother    • Cancer Mother    • Osteoporosis Other    • Heart disease Other    • Diabetes Other    • Cancer Other      Colorectal-Parent   • Asthma Other    • Asthma Sister    • Diabetes Sister    • Hypertension Sister    • Alcohol abuse Brother    • Cancer Maternal Uncle         Social History:  Social History     Social History   • Marital status: Single     Spouse name: N/A   • Number of children: N/A   • Years of education: N/A     Occupational History   • Not on file.     Social History Main Topics   • Smoking status: Former Smoker     Quit date: 2015   • Smokeless tobacco: Never Used      Comment: any smoking   • Alcohol use No   • Drug use: No   • Sexual activity: Defer     Other Topics Concern   • Not on file     Social History Narrative       Review of Systems  General:negative for - chills, fatigue, fever, hot flashes, malaise, night sweats, weight gain or weight loss  Psychological: negative for - anxiety, depression, sleep disturbances or suicidal ideation  Ophthalmic: negative for - blurry vision or loss of vision  ENT: negative for - hearing change, nasal congestion or sore throat  Hematological and Lymphatic: negative for - jaundice  Endocrine: negative for - hair pattern changes, skin changes or  "temperature intolerance  Respiratory: no cough, shortness of breath, or wheezing  Cardiovascular: no chest pain, edema or dyspnea on exertion  Gastrointestinal: no  Nausea/vomiting, abdominal pain, change in bowel habits, or black or bloody stools. Positive for heart burn  Genito-Urinary: no dysuria, trouble voiding, or hematuria  Musculoskeletal: negative for - joint pain or muscle pain  Neurological: negative for - dizziness, headaches, numbness/tingling or seizures  Dermatological: negative for rash and skin lesion changes      Objective:     /88  Pulse 88  Ht 63\" (160 cm)  Wt 108 lb 14.4 oz (49.4 kg)  LMP 03/17/1989 (Within Months)  SpO2 97%  BMI 19.29 kg/m2    Physical Exam     General Appearance:    Alert, cooperative, no distress, appears stated age   Head:    Normocephalic, without obvious abnormality, atraumatic   Eyes:    PERRL, conjunctiva/corneas clear, EOM's intact   Ears:    Normal external ear canals, both ears   Nose:   Nares normal, septum midline, mucosa normal, no drainage     or sinus tenderness   Throat:   Lips, mucosa, and tongue normal; teeth and gums normal   Neck:   Supple, symmetrical, trachea midline, no adenopathy;     thyroid:  no enlargement/tenderness/nodules; no carotid    bruit   Back:     Symmetric, no curvature, ROM normal, no CVA tenderness   Lungs:     Clear to auscultation bilaterally, respirations unlabored   Chest Wall:    No tenderness or deformity    Heart:    Regular rate and rhythm, S1 and S2 normal, no murmur, rub    or gallop   Abdomen:     Soft, non-tender, bowel sounds active all four quadrants,     no masses, no organomegaly   Extremities:   Extremities normal, atraumatic, no cyanosis or edema   Pulses:   2+ and symmetric all extremities   Skin:   Skin color, texture, turgor normal, no rashes or lesions   Lymph nodes:   Cervical, supraclavicular, and axillary nodes normal   Neurologic:   CNII-XII grossly intact              Assessment/Plan:      Diagnosis " Plan   1. Primary insomnia  hydrOXYzine (VISTARIL) 25 MG capsule       Follow-up:     Return in about 3 months (around 10/7/2017).    GOALS:  Be able to sleep     Preventative:  Female Preventative: Colonoscopy and mammogram was declined.   Vaccines:   Tetanus vaccine: not up to date - Declined  Annual influenza vaccine: up to date     Does not smoke   does not drink  BMI goals: avoid cocaine     RISK SCORE: 4      Signature   Tiffany Jimenez M.D.  Family Medicine Resident, PGY III  [address] [place] [address]  [phone]          This document has been electronically signed by Tiffany Jimenez MD on July 7, 2017 4:24 PM

## 2017-07-14 NOTE — PROGRESS NOTES
I have reviewed the notes, assessments, and/or procedures performed. I concur with her/his documentation of Maddison Sheridan.     Kalpesh Stark, DO

## 2017-08-15 ENCOUNTER — OFFICE VISIT (OUTPATIENT)
Dept: CARDIAC SURGERY | Facility: CLINIC | Age: 57
End: 2017-08-15

## 2017-08-15 VITALS
HEART RATE: 86 BPM | HEIGHT: 63 IN | SYSTOLIC BLOOD PRESSURE: 100 MMHG | DIASTOLIC BLOOD PRESSURE: 71 MMHG | TEMPERATURE: 98.7 F | BODY MASS INDEX: 19.49 KG/M2 | WEIGHT: 110 LBS | OXYGEN SATURATION: 94 %

## 2017-08-15 DIAGNOSIS — R13.19 OTHER DYSPHAGIA: ICD-10-CM

## 2017-08-15 DIAGNOSIS — J44.9 CHRONIC OBSTRUCTIVE PULMONARY DISEASE, UNSPECIFIED COPD TYPE (HCC): ICD-10-CM

## 2017-08-15 DIAGNOSIS — K21.9 GASTROESOPHAGEAL REFLUX DISEASE WITHOUT ESOPHAGITIS: ICD-10-CM

## 2017-08-15 DIAGNOSIS — C15.4 MALIGNANT NEOPLASM OF THORACIC ESOPHAGUS (HCC): Primary | ICD-10-CM

## 2017-08-15 PROCEDURE — 99214 OFFICE O/P EST MOD 30 MIN: CPT | Performed by: THORACIC SURGERY (CARDIOTHORACIC VASCULAR SURGERY)

## 2017-08-19 NOTE — PROGRESS NOTES
8/19/2017    Maddison Sheridan  1960      Chief Complaint:    Chief Complaint   Patient presents with   • Esophageal Cancer     CT results       HPI:      PCP:  Tiffany Jimenez MD  General Surgery:  Dr Bell  Oncology:  [facility]  GI:  Dr Hernandez    57 y.o. female with HTN, depression, esophageal cancer, COPD, asthma, GERD...  former smoker.  Mild-moderate dysphagia, recurrent.  Occasional dilation.   prior heavy EtOH use (12 beers/day).    recovered well from esophagectomy.  voice weak, food sticks in throat.  ensure causing diarrhea.  Tolerating diet, Difficulty swallowing, Compliant with medications and instructions, No nausea, No vomiting, No diarrhea, Weight loss. EGD with dilation by Dr Hernandez  with good relief.  Improved symptoms..  No other associated signs, symptoms or modifying factors.    9/15/2015 Esophagram:  mid esophageal mass prohibiting passage of 13mm barium tablet.  mucosal irregularity and central ulceration.  9/25/2015 CT Chest Abdomen Pelvis:  17n32ie mid esophageal mass (patricia), subcarinal adenopathy.  liver, adrenals ok.  9/2015 EGD:  mid esophageal mass.   scope passes without difficulty.  biopsy shows moderately differentiated squamous cell CA.  10/12/2015 PET CT:  mid esophageal mass (maxSUV 12.3), possible subcarinal yina involvement.  3/1/16 Walter Michael (thoracic esophagectomy, lymphadenectomly, Bronchoscopy, Placement on Q.   3/12/16 Hospital discharge on full liquid diet  3/17/16  Advance to liquid soft   3/2016: Pathology: ypT0-ypN0. Tumor margins clear. 4 lymph neg for malignancy  7/2016 CT Chest:  No PTE.  Conduit patent.  Food noted in lower portion of gastric conduit.  Contrast passes thru to abdomen.  No evidence of recurrence or metastatic disease.  10/10/2016 CXR:  Satisfactory postop.  10/21/2016 CT Chest:  No PTE.  Conduit patent.  Food noted in lower portion of gastric conduit.  Contrast passes thru to abdomen.  No evidence of recurrence or metastatic  disease.  7/6/2017 CT Chest:   Conduit patent.  Food noted in mid and lower portion of gastric conduit.  Contrast passes thru to abdomen.  No evidence of recurrence or metastatic disease.      The following portions of the patient's history were reviewed and updated as appropriate: allergies, current medications, past family history, past medical history, past social history, past surgical history and problem list.  Recent images independently reviewed.  Available laboratory values reviewed.    PMH:  Past Medical History:   Diagnosis Date   • Abnormal weight loss    • Anxiety    • Atrophic vaginitis    • Depression    • Dysphagia     pharyngoesophageal phase      • Encounter for surgical aftercare following surgery of digestive system     Walter Michael (thoracic esophagectomy, lymphadenectomly, Bronchoscopy, Placement on Q. 3/1/16      • History of esophagogastroduodenoscopy     Normal hypopharynx.A tumor was found in the middle third of the esophagus.Mul.biopsies taken.Normal GE junction.Mild nonerosive gastritis in antrum.Biopsies taken.Normal pylorus and duodenum. 09/24/2015       • History of hysteroscopy     Hysteroscopy, dilation and curettage, and repair of vaginal laceration. 08/03/2013       • History of substance abuse    • Lumbosacral strain    • Myopia    • Presbyopia    • Primary malignant neoplasm of thoracic part of esophagus     Squamous cell   • Tobacco dependence     1 ppd       ALLERGIES:  Allergies   Allergen Reactions   • Penicillins Hives   • Strawberry C [Ascorbate] Hives         MEDICATIONS:    Current Outpatient Prescriptions:   •  albuterol (PROVENTIL HFA;VENTOLIN HFA) 108 (90 BASE) MCG/ACT inhaler, Inhale 2 puffs Every 4 (Four) Hours As Needed for wheezing., Disp: 18 g, Rfl: 11  •  buPROPion SR (WELLBUTRIN SR) 150 MG 12 hr tablet, Take 1 tablet by mouth 2 (Two) Times a Day., Disp: 60 tablet, Rfl: 5  •  hydrOXYzine (VISTARIL) 25 MG capsule, Take 1 capsule by mouth Every Night., Disp: 60  capsule, Rfl: 2  •  loperamide (IMODIUM A-D) 2 MG tablet, Take 2 mg by mouth 3 (Three) Times a Day As Needed for Diarrhea., Disp: , Rfl:   •  metoclopramide (REGLAN) 10 MG tablet, Take 10 mg by mouth 4 (Four) Times a Day., Disp: , Rfl:   •  NEXIUM 24HR 20 MG capsule, Take 2 capsules by mouth Daily., Disp: 60 capsule, Rfl: 5  •  Nutritional Supplements (BOOST) liquid, Take 1 can by mouth As Needed., Disp: , Rfl:   •  ondansetron ODT (ZOFRAN-ODT) 4 MG disintegrating tablet, Take 1 tablet by mouth Every 6 (Six) Hours As Needed for nausea or vomiting., Disp: 60 tablet, Rfl: 1  •  sucralfate (CARAFATE) 1 GM/10ML suspension, Take 10 mL by mouth 4 (Four) Times a Day., Disp: 420 mL, Rfl: 6    Review of Systems   Review of Systems   Constitution: Negative for weakness, malaise/fatigue and weight loss.   Cardiovascular: Negative for chest pain, claudication and dyspnea on exertion.   Respiratory: Negative for cough and shortness of breath.    Skin: Negative for color change and poor wound healing.   Gastrointestinal: Positive for dysphagia and heartburn.   Neurological: Negative for dizziness and numbness.       Physical Exam   Physical Exam   Constitutional: She is oriented to person, place, and time. She is active and cooperative. She does not appear ill. No distress.   HENT:   Right Ear: Hearing normal.   Left Ear: Hearing normal.   Nose: No nasal deformity. No epistaxis.   Mouth/Throat: She does not have dentures. Normal dentition.   Cardiovascular: Normal rate and regular rhythm.    No murmur heard.  Pulses:       Carotid pulses are 2+ on the right side, and 2+ on the left side.       Radial pulses are 2+ on the right side, and 2+ on the left side.        Dorsalis pedis pulses are 2+ on the right side, and 2+ on the left side.        Posterior tibial pulses are 2+ on the right side, and 2+ on the left side.   Pulmonary/Chest: Effort normal and breath sounds normal.   Abdominal: Soft. She exhibits no distension and no  mass. There is no tenderness.   Musculoskeletal: She exhibits no deformity.   Gait normal.    Neurological: She is alert and oriented to person, place, and time. She has normal strength.   Skin: Skin is warm and dry. No cyanosis or erythema. No pallor.   No venous staining   Psychiatric: She has a normal mood and affect. Her speech is normal. Judgment and thought content normal.   Results for ALEIDA SHERIDAN (MRN [id number]) as of 8/19/2017 15:19   Ref. Range 4/20/2017 10:53   Creatinine Latest Ref Range: 0.50 - 1.00 mg/dL 0.73   BUN Latest Ref Range: 7 - 21 mg/dL 14       ASSESSMENT:  Aleida was seen today for esophageal cancer.    Diagnoses and all orders for this visit:    Malignant neoplasm of thoracic esophagus  -     CT Chest Without Contrast; Future    Chronic obstructive pulmonary disease, unspecified COPD type    Other dysphagia    Gastroesophageal reflux disease without esophagitis    PLAN:  Detailed discussion with Ms Sheridan regarding situation and options.  esophageal cancer with recurrent dysphagia related to benign stricture.  Has appt for dilation with Dr Hernandez..  Multiple risk factors with severe comorbidities.  No intervention indicated at this time.  Will follow with interval imaging.  Risks, benefits discussed.  Understands and wishes to proceed with plan.    Return in January 2018 with CT Chest.    Recommended regular physical activity, progressive walking program.  Continue current medications as directed.  Will Obtain relevant old records.    Thank you for the opportunity to participate in this patient's care.    Copy to primary care provider.    EMR Dragon/Transcription disclaimer:   Much of this encounter note is an electronic transcription/translation of spoken language to printed text. The electronic translation of spoken language may permit erroneous, or at times, nonsensical words or phrases to be inadvertently transcribed; Although I have reviewed the note for such errors, some  may still exist.

## 2017-09-22 ENCOUNTER — HOSPITAL ENCOUNTER (OUTPATIENT)
Facility: HOSPITAL | Age: 57
Setting detail: HOSPITAL OUTPATIENT SURGERY
Discharge: HOME | End: 2017-09-22
Attending: INTERNAL MEDICINE | Admitting: INTERNAL MEDICINE

## 2017-09-22 ENCOUNTER — ANESTHESIA (OUTPATIENT)
Dept: GASTROENTEROLOGY | Facility: HOSPITAL | Age: 57
End: 2017-09-22

## 2017-09-22 ENCOUNTER — ANESTHESIA EVENT (OUTPATIENT)
Dept: GASTROENTEROLOGY | Facility: HOSPITAL | Age: 57
End: 2017-09-22

## 2017-09-22 VITALS
BODY MASS INDEX: 19.65 KG/M2 | HEIGHT: 63 IN | SYSTOLIC BLOOD PRESSURE: 115 MMHG | WEIGHT: 110.89 LBS | TEMPERATURE: 98.7 F | HEART RATE: 77 BPM | DIASTOLIC BLOOD PRESSURE: 64 MMHG | OXYGEN SATURATION: 98 % | RESPIRATION RATE: 16 BRPM

## 2017-09-22 PROCEDURE — 43248 EGD GUIDE WIRE INSERTION: CPT | Performed by: INTERNAL MEDICINE

## 2017-09-22 PROCEDURE — 25010000002 FENTANYL CITRATE (PF) 100 MCG/2ML SOLUTION: Performed by: NURSE ANESTHETIST, CERTIFIED REGISTERED

## 2017-09-22 PROCEDURE — 25010000002 MIDAZOLAM PER 1 MG: Performed by: NURSE ANESTHETIST, CERTIFIED REGISTERED

## 2017-09-22 PROCEDURE — 25010000002 PROPOFOL 10 MG/ML EMULSION: Performed by: NURSE ANESTHETIST, CERTIFIED REGISTERED

## 2017-09-22 RX ORDER — DEXTROSE AND SODIUM CHLORIDE 5; .45 G/100ML; G/100ML
20 INJECTION, SOLUTION INTRAVENOUS CONTINUOUS
Status: DISCONTINUED | OUTPATIENT
Start: 2017-09-22 | End: 2017-09-22 | Stop reason: HOSPADM

## 2017-09-22 RX ORDER — PROMETHAZINE HYDROCHLORIDE 25 MG/1
25 SUPPOSITORY RECTAL ONCE AS NEEDED
Status: DISCONTINUED | OUTPATIENT
Start: 2017-09-22 | End: 2017-09-22 | Stop reason: HOSPADM

## 2017-09-22 RX ORDER — PROMETHAZINE HYDROCHLORIDE 25 MG/ML
12.5 INJECTION, SOLUTION INTRAMUSCULAR; INTRAVENOUS ONCE AS NEEDED
Status: DISCONTINUED | OUTPATIENT
Start: 2017-09-22 | End: 2017-09-22 | Stop reason: HOSPADM

## 2017-09-22 RX ORDER — DEXAMETHASONE SODIUM PHOSPHATE 4 MG/ML
8 INJECTION, SOLUTION INTRA-ARTICULAR; INTRALESIONAL; INTRAMUSCULAR; INTRAVENOUS; SOFT TISSUE ONCE AS NEEDED
Status: DISCONTINUED | OUTPATIENT
Start: 2017-09-22 | End: 2017-09-22 | Stop reason: HOSPADM

## 2017-09-22 RX ORDER — FENTANYL CITRATE 50 UG/ML
INJECTION, SOLUTION INTRAMUSCULAR; INTRAVENOUS AS NEEDED
Status: DISCONTINUED | OUTPATIENT
Start: 2017-09-22 | End: 2017-09-22 | Stop reason: SURG

## 2017-09-22 RX ORDER — PROMETHAZINE HYDROCHLORIDE 25 MG/1
25 TABLET ORAL ONCE AS NEEDED
Status: DISCONTINUED | OUTPATIENT
Start: 2017-09-22 | End: 2017-09-22 | Stop reason: HOSPADM

## 2017-09-22 RX ORDER — MIDAZOLAM HYDROCHLORIDE 1 MG/ML
INJECTION INTRAMUSCULAR; INTRAVENOUS AS NEEDED
Status: DISCONTINUED | OUTPATIENT
Start: 2017-09-22 | End: 2017-09-22 | Stop reason: SURG

## 2017-09-22 RX ORDER — LIDOCAINE HYDROCHLORIDE 10 MG/ML
INJECTION, SOLUTION INFILTRATION; PERINEURAL AS NEEDED
Status: DISCONTINUED | OUTPATIENT
Start: 2017-09-22 | End: 2017-09-22 | Stop reason: SURG

## 2017-09-22 RX ORDER — PROPOFOL 10 MG/ML
VIAL (ML) INTRAVENOUS AS NEEDED
Status: DISCONTINUED | OUTPATIENT
Start: 2017-09-22 | End: 2017-09-22 | Stop reason: SURG

## 2017-09-22 RX ORDER — ONDANSETRON 2 MG/ML
4 INJECTION INTRAMUSCULAR; INTRAVENOUS ONCE AS NEEDED
Status: DISCONTINUED | OUTPATIENT
Start: 2017-09-22 | End: 2017-09-22 | Stop reason: HOSPADM

## 2017-09-22 RX ADMIN — PROPOFOL 20 MG: 10 INJECTION, EMULSION INTRAVENOUS at 10:52

## 2017-09-22 RX ADMIN — PROPOFOL 50 MG: 10 INJECTION, EMULSION INTRAVENOUS at 10:49

## 2017-09-22 RX ADMIN — LIDOCAINE HYDROCHLORIDE 60 MG: 10 INJECTION, SOLUTION INFILTRATION; PERINEURAL at 10:42

## 2017-09-22 RX ADMIN — MIDAZOLAM 2 MG: 1 INJECTION INTRAMUSCULAR; INTRAVENOUS at 10:42

## 2017-09-22 RX ADMIN — FENTANYL CITRATE 50 MCG: 50 INJECTION, SOLUTION INTRAMUSCULAR; INTRAVENOUS at 10:42

## 2017-09-22 RX ADMIN — PROPOFOL 50 MG: 10 INJECTION, EMULSION INTRAVENOUS at 10:46

## 2017-09-22 RX ADMIN — DEXTROSE AND SODIUM CHLORIDE 20 ML/HR: 5; 450 INJECTION, SOLUTION INTRAVENOUS at 10:02

## 2017-09-22 RX ADMIN — FENTANYL CITRATE 50 MCG: 50 INJECTION, SOLUTION INTRAMUSCULAR; INTRAVENOUS at 10:48

## 2017-09-22 NOTE — ANESTHESIA POSTPROCEDURE EVALUATION
Patient: Maddison Sheridan    Procedure Summary     Date Anesthesia Start Anesthesia Stop Room / Location    09/22/17 1042 1054 [facility] ENDOSCOPY 1 / [facility] ENDOSCOPY       Procedure Diagnosis Surgeon Provider    ESOPHAGOGASTRODUODENOSCOPY (N/A Esophagus) Dysphagia  (Dysphagia [R13.10]) MD Enid Vásquez CRNA          Anesthesia Type: MAC  Last vitals  BP   (!) 172/104 (09/22/17 0955)    Temp   97.6 °F (36.4 °C) (09/22/17 0955)    Pulse   56 (09/22/17 0955)   Resp   16 (09/22/17 0955)    SpO2   97 % (09/22/17 0955)      Post Anesthesia Care and Evaluation    Patient location during evaluation: bedside  Patient participation: complete - patient participated  Level of consciousness: awake and awake and alert  Pain management: adequate  Airway patency: patent  Anesthetic complications: No anesthetic complications    Cardiovascular status: acceptable  Respiratory status: acceptable  Hydration status: acceptable

## 2017-09-22 NOTE — PLAN OF CARE
Problem: Patient Care Overview (Adult)  Goal: Plan of Care Review  Outcome: Outcome(s) achieved Date Met:  09/22/17 09/22/17 1114   Coping/Psychosocial Response Interventions   Plan Of Care Reviewed With patient   Patient Care Overview   Progress no change   Outcome Evaluation   Outcome Summary/Follow up Plan pt alert, vss         Problem: GI Endoscopy (Adult)  Goal: Signs and Symptoms of Listed Potential Problems Will be Absent or Manageable (GI Endoscopy)  Outcome: Outcome(s) achieved Date Met:  09/22/17 09/22/17 1114   GI Endoscopy   Problems Assessed (GI Endoscopy) all   Problems Present (GI Endoscopy) none

## 2017-09-22 NOTE — ANESTHESIA PREPROCEDURE EVALUATION
Anesthesia Evaluation     Patient summary reviewed and Nursing notes reviewed   NPO Solid Status: > 8 hours  NPO Liquid Status: > 2 hours     Airway   Mallampati: I  TM distance: >3 FB  Neck ROM: full  Narrow palate  Dental - normal exam     Pulmonary - normal exam   (+) a smoker Former, COPD moderate,   Cardiovascular - negative cardio ROS and normal exam        Neuro/Psych  (+) psychiatric history Anxiety and Depression,    GI/Hepatic/Renal/Endo    (+)  GERD,     Musculoskeletal     (+) back pain,   Abdominal  - normal exam   Substance History - negative use     OB/GYN negative ob/gyn ROS         Other      history of cancer                                    Anesthesia Plan    ASA 3     MAC     Anesthetic plan and risks discussed with patient.

## 2017-09-22 NOTE — PLAN OF CARE
Problem: Patient Care Overview (Adult)  Goal: Plan of Care Review    09/22/17 1054   Coping/Psychosocial Response Interventions   Plan Of Care Reviewed With patient   Patient Care Overview   Progress no change   Outcome Evaluation   Outcome Summary/Follow up Plan pt tolerated well         Problem: GI Endoscopy (Adult)  Goal: Signs and Symptoms of Listed Potential Problems Will be Absent or Manageable (GI Endoscopy)    09/22/17 1054   GI Endoscopy   Problems Assessed (GI Endoscopy) all   Problems Present (GI Endoscopy) none

## 2017-09-22 NOTE — H&P
Progress Notes  Encounter Date: 9/22/2017  Babar Connell MD   Gastroenterology   Expand All Collapse All    []Hide copied text  []Gillian for attribution information  [facility]        Chief Complaint:        Chief Complaint   Patient presents with   • Difficulty Swallowing       3 month follow up   • EGD       Follow up from 05/22/2017   • Abdominal Pain            Subjective         HPI:   Patient with episodes of dysphagia.  Patient underwent EGD and showed a large amount of food in the stomach.  Patient states that it been approximately 8 hours since she had eaten.  Patient may have been an element of gastroparesis versus her esophagus.  We'll have patient stop her eating for 24 hours prior to procedure.     Plan; we'll schedule patient for EGD with dilatation.  If food is still in the stomach at 24 hours on clear liquid will need to do motility studies.     Past Medical History:    Medical History         Past Medical History:   Diagnosis Date   • Abnormal weight loss     • Anxiety     • Atrophic vaginitis     • Depression     • Dysphagia       pharyngoesophageal phase      • Encounter for surgical aftercare following surgery of digestive system       Falcon Michael (thoracic esophagectomy, lymphadenectomly, Bronchoscopy, Placement on Q. 3/1/16      • Essential hypertension     • History of esophagogastroduodenoscopy       Normal hypopharynx.A tumor was found in the middle third of the esophagus.Mul.biopsies taken.Normal GE junction.Mild nonerosive gastritis in antrum.Biopsies taken.Normal pylorus and duodenum. 09/24/2015       • History of hysteroscopy       Hysteroscopy, dilation and curettage, and repair of vaginal laceration. 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 She has never used smokeless tobacco. She reports that she does not drink alcohol or use illicit drugs.     Medications:    Current Medications           Current Outpatient Prescriptions   Medication Sig Dispense Refill   • albuterol (PROVENTIL HFA;VENTOLIN HFA) 108 (90 BASE) MCG/ACT inhaler Inhale 2 puffs Every 4 (Four) Hours As Needed for wheezing. 18 g 11   • buPROPion SR (WELLBUTRIN SR) 150 MG 12 hr tablet Take 1 tablet by mouth 2 (Two) Times a Day. 60 tablet 5   • docusate sodium (COLACE) 100 MG capsule Take 1 capsule by mouth 2 (Two) Times a Day. 60 capsule 3   • HYDROcodone-acetaminophen (NORCO)  MG per tablet Take 1 tablet by mouth 3 (Three) Times a Day As Needed for Moderate Pain (4-6). 90 tablet 0   • hydrOXYzine (VISTARIL) 25 MG capsule TAKE 1 CAPSULE AT BEDTIME 90 capsule 6   • loperamide (IMODIUM) 2 MG capsule TAKE 1 CAPSULE THREE TIMES DAILY 180 capsule 6   • metoclopramide (REGLAN) 10 MG tablet TAKE 1 TABLET FOUR TIMES DAILY 336 tablet 6   • NEXIUM 24HR 20 MG capsule Take 2 capsules by mouth Daily. 60 capsule 5   • Nutritional Supplements (BOOST) liquid Take 1 can by mouth As Needed.       • ondansetron ODT (ZOFRAN-ODT) 4 MG disintegrating tablet Take 1 tablet by mouth Every 6 (Six) Hours As Needed for nausea or vomiting. 60 tablet 1   • sucralfate (CARAFATE) 1 GM/10ML suspension Take 10 mL by mouth 4 (Four) Times a Day. 420 mL 6      No current facility-administered medications for this visit.             Allergies:  Penicillins and Strawberry c [ascorbate]     ROS:    Review of  "Systems   Constitutional: Negative for activity change, appetite change, chills, diaphoresis, fatigue, fever and unexpected weight change.   HENT: Negative for sore throat and trouble swallowing.    Respiratory: Negative for shortness of breath.    Gastrointestinal: Negative for abdominal distention, abdominal pain, anal bleeding, blood in stool, constipation, diarrhea, nausea, rectal pain and vomiting.   Musculoskeletal: Negative for arthralgias.   Skin: Negative for pallor.   Neurological: Negative for light-headedness.         Objective          Blood pressure 179/96, pulse 65, height 63\" (160 cm), weight 110 lb 11.2 oz (50.2 kg), last menstrual period 03/17/1989, not currently breastfeeding.     Physical Exam   Constitutional: She is oriented to person, place, and time. She appears well-developed and well-nourished. No distress.   HENT:   Head: Normocephalic and atraumatic.   Cardiovascular: Normal rate, regular rhythm, normal heart sounds and intact distal pulses.  Exam reveals no gallop and no friction rub.    No murmur heard.  Pulmonary/Chest: Breath sounds normal. No respiratory distress. She has no wheezes. She has no rales. She exhibits no tenderness.   Abdominal: Soft. Bowel sounds are normal. She exhibits no distension and no mass. There is no tenderness. There is no rebound and no guarding. No hernia.   Musculoskeletal: Normal range of motion. She exhibits no edema.   Neurological: She is alert and oriented to person, place, and time.   Skin: Skin is warm and dry. No rash noted. She is not diaphoretic. No erythema. No pallor.   Psychiatric: She has a normal mood and affect. Her behavior is normal. Judgment and thought content normal.            Assessment/Plan       Maddison was seen today for difficulty swallowing, egd and abdominal pain.     Diagnoses and all orders for this visit:     Dysphagia, unspecified type  -     Case Request; Standing  -     sodium chloride 0.9 % flush 1-10 mL; Infuse 1-10 mL " into a venous catheter As Needed for Line Care.  -     dextrose 5 % and sodium chloride 0.45 % infusion; Infuse 30 mL/hr into a venous catheter Continuous As Needed (Start Prior to Procedure).  -     Case Request     Other orders  -     Implement Anesthesia Orders Day of Procedure; Standing  -     Obtain Informed Consent; Standing  -     Insert Peripheral IV; Standing  -     Saline Lock & Maintain IV Access; Standing        ESOPHAGOGASTRODUODENOSCOPY (N/A)       Diagnosis Plan   1. Dysphagia, unspecified type  Case Request     sodium chloride 0.9 % flush 1-10 mL     dextrose 5 % and sodium chloride 0.45 % infusion     Case Request         Anticipated Surgical Procedure:  No orders of the defined types were placed in this encounter.        The risks, benefits, and alternatives of this procedure have been discussed with the patient or the responsible party- the patient understands and agrees to proceed.                                                                                                           Office Visit on 6/13/2017              Revision History              Detailed Report

## 2017-10-11 ENCOUNTER — TELEPHONE (OUTPATIENT)
Dept: FAMILY MEDICINE CLINIC | Facility: CLINIC | Age: 57
End: 2017-10-11

## 2017-10-11 NOTE — TELEPHONE ENCOUNTER
PT IS MOVING OUT OF TOWN. WOULD LIKE FOR A LETTER TO BE WRITTEN TO RELEASE HER FROM YOUR CARE FOR HER NEW PCP.  PT HAS APPT [phone]

## 2017-10-12 NOTE — TELEPHONE ENCOUNTER
Letter will be completed    Signature   Tiffany Jimenez M.D.  Family Medicine Resident, PGY III  [address] [id number] [phone]          This document has been electronically signed by Tiffany Jimenez MD on October 12, 2017 3:16 PM

## 2017-11-07 ENCOUNTER — OFFICE VISIT (OUTPATIENT)
Dept: GASTROENTEROLOGY | Facility: CLINIC | Age: 57
End: 2017-11-07

## 2017-11-07 VITALS
WEIGHT: 112.4 LBS | SYSTOLIC BLOOD PRESSURE: 118 MMHG | HEART RATE: 80 BPM | HEIGHT: 63 IN | BODY MASS INDEX: 19.91 KG/M2 | DIASTOLIC BLOOD PRESSURE: 70 MMHG

## 2017-11-07 DIAGNOSIS — Z12.11 ENCOUNTER FOR SCREENING FOR MALIGNANT NEOPLASM OF COLON: Primary | ICD-10-CM

## 2017-11-07 DIAGNOSIS — R13.10 DYSPHAGIA, UNSPECIFIED TYPE: ICD-10-CM

## 2017-11-07 PROCEDURE — 99213 OFFICE O/P EST LOW 20 MIN: CPT | Performed by: INTERNAL MEDICINE

## 2017-11-07 RX ORDER — DEXTROSE AND SODIUM CHLORIDE 5; .45 G/100ML; G/100ML
30 INJECTION, SOLUTION INTRAVENOUS CONTINUOUS PRN
Status: CANCELLED | OUTPATIENT
Start: 2017-11-07

## 2017-11-07 RX ORDER — SODIUM, POTASSIUM,MAG SULFATES 17.5-3.13G
1 SOLUTION, RECONSTITUTED, ORAL ORAL EVERY 12 HOURS
Qty: 1 BOTTLE | Refills: 0 | Status: SHIPPED | OUTPATIENT
Start: 2017-11-07 | End: 2017-11-10 | Stop reason: CLARIF

## 2017-11-07 NOTE — PROGRESS NOTES
[facility]      Chief Complaint:   Chief Complaint   Patient presents with   • Difficulty Swallowing   • Results     EGD Follow up fr, 09/22/2017       Subjective     HPI:   Patient with dysphagia.  Patient states marked improvement with dilatation but states has had some recurrence.  Patient has not had screening colonoscopy.  Patient is ready to do that at this time.    Plan; we'll schedule patient for EGD and colonoscopy EGD with dilatation.    Past Medical History:   Past Medical History:   Diagnosis Date   • Abnormal weight loss    • Anxiety    • Atrophic vaginitis    • Depression    • Dysphagia     pharyngoesophageal phase      • Encounter for surgical aftercare following surgery of digestive system     Ezel Michael (thoracic esophagectomy, lymphadenectomly, Bronchoscopy, Placement on Q. 3/1/16      • History of esophagogastroduodenoscopy     Normal hypopharynx.A tumor was found in the middle third of the esophagus.Mul.biopsies taken.Normal GE junction.Mild nonerosive gastritis in antrum.Biopsies taken.Normal pylorus and duodenum. 09/24/2015       • History of hysteroscopy     Hysteroscopy, dilation and curettage, and repair of vaginal laceration. 08/03/2013       • History of substance abuse    • Lumbosacral strain    • Myopia    • Presbyopia    • Primary malignant neoplasm of thoracic part of esophagus     Squamous cell   • Tobacco dependence     1 ppd       Family History:  Family History   Problem Relation Age of Onset   • Ovarian cancer Mother    • Cancer Mother    • Osteoporosis Other    • Heart disease Other    • Diabetes Other    • Cancer Other      Colorectal-Parent   • Asthma Other    • Asthma Sister    • Diabetes Sister    • Hypertension Sister    • Alcohol abuse Brother    • Cancer Maternal Uncle        Social History:   reports that she quit smoking about 2 years ago. She has never used smokeless tobacco. She reports that she does not drink alcohol or use illicit  drugs.    Medications:   Prior to Admission medications    Medication Sig Start Date End Date Taking? Authorizing Provider   albuterol (PROVENTIL HFA;VENTOLIN HFA) 108 (90 BASE) MCG/ACT inhaler Inhale 2 puffs Every 4 (Four) Hours As Needed for wheezing. 2/17/17  Yes Tiffany Jimenez MD   buPROPion SR (WELLBUTRIN SR) 150 MG 12 hr tablet Take 1 tablet by mouth 2 (Two) Times a Day. 4/20/17  Yes Tiffany Jimenez MD   hydrOXYzine (VISTARIL) 25 MG capsule Take 1 capsule by mouth Every Night. 7/7/17  Yes Tiffany Jimenez MD   loperamide (IMODIUM A-D) 2 MG tablet Take 2 mg by mouth 3 (Three) Times a Day As Needed for Diarrhea.   Yes Historical Provider, MD   metoclopramide (REGLAN) 10 MG tablet Take 10 mg by mouth 4 (Four) Times a Day.   Yes Historical Provider, MD   NEXIUM 24HR 20 MG capsule Take 2 capsules by mouth Daily. 4/20/17  Yes Tiffany Jimenez MD   Nutritional Supplements (BOOST) liquid Take 1 can by mouth As Needed.   Yes Historical Provider, MD   ondansetron ODT (ZOFRAN-ODT) 4 MG disintegrating tablet Take 1 tablet by mouth Every 6 (Six) Hours As Needed for nausea or vomiting. 12/23/16  Yes Tiffany Jimenez MD   sucralfate (CARAFATE) 1 GM/10ML suspension Take 10 mL by mouth 4 (Four) Times a Day. 4/21/17  Yes Tiffany Jimenez MD   sodium-potassium-magnesium sulfates (SUPREP) 17.5-3.13-1.6 GM/180ML solution oral solution Take 1 bottle by mouth Every 12 (Twelve) Hours. 11/7/17   Babar Connell MD       Allergies:  Penicillins and Strawberry c [ascorbate]    ROS:    Review of Systems   Constitutional: Negative for activity change, appetite change, chills, diaphoresis, fatigue, fever and unexpected weight change.   HENT: Negative for sore throat and trouble swallowing.    Respiratory: Negative for shortness of breath.    Gastrointestinal: Negative for abdominal distention, abdominal pain, anal bleeding, blood in stool, constipation, diarrhea, nausea, rectal pain and vomiting.   Musculoskeletal: Negative for  "arthralgias.   Skin: Negative for pallor.   Neurological: Negative for light-headedness.     Objective     Blood pressure 118/70, pulse 80, height 63\" (160 cm), weight 112 lb 6.4 oz (51 kg), last menstrual period 03/17/1989, not currently breastfeeding.    Physical Exam   Constitutional: She is oriented to person, place, and time. She appears well-developed and well-nourished. No distress.   HENT:   Head: Normocephalic and atraumatic.   Cardiovascular: Normal rate, regular rhythm, normal heart sounds and intact distal pulses.  Exam reveals no gallop and no friction rub.    No murmur heard.  Pulmonary/Chest: Breath sounds normal. No respiratory distress. She has no wheezes. She has no rales. She exhibits no tenderness.   Abdominal: Soft. Bowel sounds are normal. She exhibits no distension and no mass. There is no tenderness. There is no rebound and no guarding. No hernia.   Musculoskeletal: Normal range of motion. She exhibits no edema.   Neurological: She is alert and oriented to person, place, and time.   Skin: Skin is warm and dry. No rash noted. She is not diaphoretic. No erythema. No pallor.   Psychiatric: She has a normal mood and affect. Her behavior is normal. Judgment and thought content normal.        Assessment/Plan   Maddison was seen today for difficulty swallowing and results.    Diagnoses and all orders for this visit:    Encounter for screening for malignant neoplasm of colon  -     Case Request; Standing  -     dextrose 5 % and sodium chloride 0.45 % infusion; Infuse 30 mL/hr into a venous catheter Continuous As Needed (Start Prior to Procedure).  -     Case Request    Dysphagia, unspecified type  -     Case Request; Standing  -     Case Request    Other orders  -     Implement Anesthesia Orders Day of Procedure; Standing  -     Obtain Informed Consent; Standing  -     POC Glucose Fingerstick; Standing  -     sodium-potassium-magnesium sulfates (SUPREP) 17.5-3.13-1.6 GM/180ML solution oral solution; " Take 1 bottle by mouth Every 12 (Twelve) Hours.        COLONOSCOPY (N/A)     Diagnosis Plan   1. Encounter for screening for malignant neoplasm of colon  Case Request    dextrose 5 % and sodium chloride 0.45 % infusion    Case Request   2. Dysphagia, unspecified type  Case Request    Case Request       Anticipated Surgical Procedure:  No orders of the defined types were placed in this encounter.      The risks, benefits, and alternatives of this procedure have been discussed with the patient or the responsible party- the patient understands and agrees to proceed.

## 2017-11-13 DIAGNOSIS — F33.41 RECURRENT MAJOR DEPRESSIVE DISORDER, IN PARTIAL REMISSION (HCC): ICD-10-CM

## 2017-11-14 RX ORDER — BUPROPION HYDROCHLORIDE 150 MG/1
TABLET, EXTENDED RELEASE ORAL
Qty: 60 TABLET | Refills: 5 | Status: SHIPPED | OUTPATIENT
Start: 2017-11-14 | End: 2018-05-30

## 2017-12-04 ENCOUNTER — APPOINTMENT (OUTPATIENT)
Dept: CT IMAGING | Facility: HOSPITAL | Age: 57
End: 2017-12-04

## 2017-12-04 ENCOUNTER — HOSPITAL ENCOUNTER (OUTPATIENT)
Dept: CT IMAGING | Facility: HOSPITAL | Age: 57
Discharge: HOME | End: 2017-12-04
Admitting: INTERNAL MEDICINE

## 2017-12-04 DIAGNOSIS — R13.19 OTHER DYSPHAGIA: ICD-10-CM

## 2017-12-04 DIAGNOSIS — C15.9 ESOPHAGEAL CARCINOMA (HCC): ICD-10-CM

## 2017-12-04 PROCEDURE — 74177 CT ABD & PELVIS W/CONTRAST: CPT

## 2017-12-04 PROCEDURE — 71260 CT THORAX DX C+: CPT

## 2017-12-04 PROCEDURE — 0 IOPAMIDOL 61 % SOLUTION: Performed by: INTERNAL MEDICINE

## 2017-12-04 RX ADMIN — IOPAMIDOL 80 ML: 612 INJECTION, SOLUTION INTRAVENOUS at 16:00

## 2017-12-06 ENCOUNTER — LAB (OUTPATIENT)
Dept: ONCOLOGY | Facility: HOSPITAL | Age: 57
End: 2017-12-06

## 2017-12-06 ENCOUNTER — OFFICE VISIT (OUTPATIENT)
Dept: ONCOLOGY | Facility: CLINIC | Age: 57
End: 2017-12-06

## 2017-12-06 VITALS
HEART RATE: 60 BPM | DIASTOLIC BLOOD PRESSURE: 76 MMHG | HEIGHT: 63 IN | WEIGHT: 115.96 LBS | TEMPERATURE: 97.5 F | SYSTOLIC BLOOD PRESSURE: 106 MMHG | RESPIRATION RATE: 18 BRPM | BODY MASS INDEX: 20.55 KG/M2

## 2017-12-06 DIAGNOSIS — Z85.01 HISTORY OF ESOPHAGEAL CANCER: Primary | ICD-10-CM

## 2017-12-06 DIAGNOSIS — C15.9 ESOPHAGEAL CARCINOMA (HCC): ICD-10-CM

## 2017-12-06 DIAGNOSIS — R13.19 OTHER DYSPHAGIA: ICD-10-CM

## 2017-12-06 LAB
ALBUMIN SERPL-MCNC: 4.2 G/DL (ref 3.4–4.8)
ALBUMIN/GLOB SERPL: 1.4 G/DL (ref 1.1–1.8)
ALP SERPL-CCNC: 77 U/L (ref 38–126)
ALT SERPL W P-5'-P-CCNC: 27 U/L (ref 9–52)
ANION GAP SERPL CALCULATED.3IONS-SCNC: 11 MMOL/L (ref 5–15)
AST SERPL-CCNC: 45 U/L (ref 14–36)
BASOPHILS # BLD AUTO: 0.02 10*3/MM3 (ref 0–0.2)
BASOPHILS NFR BLD AUTO: 0.5 % (ref 0–2)
BILIRUB SERPL-MCNC: 0.9 MG/DL (ref 0.2–1.3)
BUN BLD-MCNC: 15 MG/DL (ref 7–21)
BUN/CREAT SERPL: 14.4 (ref 7–25)
CALCIUM SPEC-SCNC: 9.3 MG/DL (ref 8.4–10.2)
CHLORIDE SERPL-SCNC: 98 MMOL/L (ref 95–110)
CO2 SERPL-SCNC: 32 MMOL/L (ref 22–31)
CREAT BLD-MCNC: 1.04 MG/DL (ref 0.5–1)
DEPRECATED RDW RBC AUTO: 44 FL (ref 36.4–46.3)
EOSINOPHIL # BLD AUTO: 0.06 10*3/MM3 (ref 0–0.7)
EOSINOPHIL NFR BLD AUTO: 1.6 % (ref 0–7)
ERYTHROCYTE [DISTWIDTH] IN BLOOD BY AUTOMATED COUNT: 12.9 % (ref 11.5–14.5)
GFR SERPL CREATININE-BSD FRML MDRD: 66 ML/MIN/1.73 (ref 51–120)
GLOBULIN UR ELPH-MCNC: 3.1 GM/DL (ref 2.3–3.5)
GLUCOSE BLD-MCNC: 104 MG/DL (ref 60–100)
HCT VFR BLD AUTO: 39.8 % (ref 35–45)
HGB BLD-MCNC: 13.3 G/DL (ref 12–15.5)
IMM GRANULOCYTES # BLD: 0.01 10*3/MM3 (ref 0–0.02)
IMM GRANULOCYTES NFR BLD: 0.3 % (ref 0–0.5)
LYMPHOCYTES # BLD AUTO: 1.07 10*3/MM3 (ref 0.6–4.2)
LYMPHOCYTES NFR BLD AUTO: 28.1 % (ref 10–50)
MCH RBC QN AUTO: 31.4 PG (ref 26.5–34)
MCHC RBC AUTO-ENTMCNC: 33.4 G/DL (ref 31.4–36)
MCV RBC AUTO: 94.1 FL (ref 80–98)
MONOCYTES # BLD AUTO: 0.5 10*3/MM3 (ref 0–0.9)
MONOCYTES NFR BLD AUTO: 13.1 % (ref 0–12)
NEUTROPHILS # BLD AUTO: 2.15 10*3/MM3 (ref 2–8.6)
NEUTROPHILS NFR BLD AUTO: 56.4 % (ref 37–80)
PLATELET # BLD AUTO: 212 10*3/MM3 (ref 150–450)
PMV BLD AUTO: 11.2 FL (ref 8–12)
POTASSIUM BLD-SCNC: 3.6 MMOL/L (ref 3.5–5.1)
PROT SERPL-MCNC: 7.3 G/DL (ref 6.3–8.6)
RBC # BLD AUTO: 4.23 10*6/MM3 (ref 3.77–5.16)
SODIUM BLD-SCNC: 141 MMOL/L (ref 137–145)
WBC NRBC COR # BLD: 3.81 10*3/MM3 (ref 3.2–9.8)

## 2017-12-06 PROCEDURE — 99214 OFFICE O/P EST MOD 30 MIN: CPT | Performed by: NURSE PRACTITIONER

## 2017-12-06 PROCEDURE — G0463 HOSPITAL OUTPT CLINIC VISIT: HCPCS | Performed by: NURSE PRACTITIONER

## 2017-12-06 PROCEDURE — 80053 COMPREHEN METABOLIC PANEL: CPT | Performed by: INTERNAL MEDICINE

## 2017-12-06 PROCEDURE — 85025 COMPLETE CBC W/AUTO DIFF WBC: CPT | Performed by: INTERNAL MEDICINE

## 2017-12-06 PROCEDURE — 36415 COLL VENOUS BLD VENIPUNCTURE: CPT | Performed by: INTERNAL MEDICINE

## 2017-12-06 RX ORDER — AMLODIPINE BESYLATE 10 MG/1
10 TABLET ORAL DAILY
COMMUNITY
Start: 2017-11-13 | End: 2019-01-10 | Stop reason: ALTCHOICE

## 2017-12-06 RX ORDER — FAMOTIDINE 20 MG/1
20 TABLET, FILM COATED ORAL DAILY
COMMUNITY
Start: 2017-11-13 | End: 2018-12-18

## 2017-12-06 RX ORDER — POTASSIUM CHLORIDE 20 MEQ/1
20 TABLET, EXTENDED RELEASE ORAL DAILY
COMMUNITY
Start: 2017-11-13 | End: 2018-12-18

## 2017-12-06 RX ORDER — DICYCLOMINE HCL 20 MG
TABLET ORAL
COMMUNITY
Start: 2017-11-16 | End: 2017-12-12

## 2017-12-06 RX ORDER — HYDROCHLOROTHIAZIDE 12.5 MG/1
12.5 TABLET ORAL DAILY
COMMUNITY
Start: 2017-11-13 | End: 2017-12-12

## 2017-12-06 RX ORDER — MIRTAZAPINE 15 MG/1
30 TABLET, FILM COATED ORAL NIGHTLY
COMMUNITY
Start: 2017-11-16 | End: 2019-08-22 | Stop reason: DRUGHIGH

## 2017-12-06 NOTE — PROGRESS NOTES
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 She received concurrent chemotherapy and radiation therapy followed by surgery in March 2016. Pathology showed [id number]. She has required multiple dilatations following surgery for dysphagia. She is scheduled to have procedure this month along with a colonoscopy. She was last seen at [facility] in April 2017 and CT scans were ordered to be done this month. Pt here today for results.    PAST MEDICAL HISTORY:    Past Medical History:   Diagnosis Date   • Abnormal weight loss    • Anxiety    • Atrophic vaginitis    • Depression    • Dysphagia     pharyngoesophageal phase      • Encounter for surgical aftercare following surgery of digestive system     Nursery Michael (thoracic esophagectomy, lymphadenectomly, Bronchoscopy, Placement on Q. 3/1/16      • History of esophagogastroduodenoscopy     Normal hypopharynx.A tumor was found in the middle third of the esophagus.Mul.biopsies taken.Normal GE junction.Mild nonerosive gastritis in antrum.Biopsies taken.Normal pylorus and duodenum. 09/24/2015       • History of hysteroscopy     Hysteroscopy, dilation and curettage, and repair of vaginal laceration. 08/03/2013       • History of substance abuse    • Lumbosacral strain    • Myopia    • Presbyopia    • Primary malignant neoplasm of thoracic part of esophagus     Squamous cell   • Tobacco dependence     1 ppd       SOCIAL HISTORY:    Social History   Substance Use Topics   • Smoking status: Former Smoker     Quit date: 2015   • Smokeless tobacco: Never Used   • Alcohol use No       Surgical History :  Past Surgical History:   Procedure Laterality Date   • ABDOMINAL SURGERY  03/01/2016    Abdominal portion of an Nursery Michael esophagogastrectomy with jejunostomy tube  "placement. Esophageal carcinoma.   • ENDOSCOPY N/A 2/3/2017    Procedure: ESOPHAGOGASTRODUODENOSCOPY;  Surgeon: Babar Connell MD;  Location: [facility] ENDOSCOPY;  Service:    • ENDOSCOPY N/A 5/22/2017    Procedure: ESOPHAGOGASTRODUODENOSCOPY;  Surgeon: Babar Connell MD;  Location: [facility] ENDOSCOPY;  Service:    • ENDOSCOPY N/A 9/22/2017    Procedure: ESOPHAGOGASTRODUODENOSCOPY;  Surgeon: Babar Connell MD;  Location: [facility] ENDOSCOPY;  Service:    • ESOPHAGUS SURGERY      Thoracic esophagectomy (Walter Michael type).Thoracic lymphadenectomy.Fiberoptic bronchoscopy.Placement of On Q pain pump.Jejunostomy tube placement.Pyloromyotomy. 03/01/2016      • LUMBAR DISC SURGERY     • UPPER GASTROINTESTINAL ENDOSCOPY  02/03/2017   • UPPER GASTROINTESTINAL ENDOSCOPY  05/22/2017   • UPPER GASTROINTESTINAL ENDOSCOPY  09/22/2017       ALLERGIES:    Allergies   Allergen Reactions   • Penicillins Hives   • Strawberry C [Ascorbate] Hives       REVIEW OF SYSTEMS:      CONSTITUTIONAL:  No fever, chills, or night sweats.     HEENT:  No epistaxis, mouth sores, she is having some dysphagia, she is scheduled for repeat dilatation this month.    RESPIRATORY:  No new shortness of breath or cough at present.    CARDIOVASCULAR:  No chest pain or palpitations.    GASTROINTESTINAL:  No new abdominal pain, nausea, vomiting, or blood in the stool.    GENITOURINARY:  No dysuria or hematuria.    MUSCULOSKELETAL:  No any new back pain or arthralgias.     NEUROLOGICAL:  No tingling or numbness. No new headache or dizziness.     LYMPHATICS:  Denies any abnormal swollen and anywhere in the body.    SKIN:  Denies any new skin rash.    PHYSICAL EXAMINATION:      VITAL SIGNS:  /76  Pulse 60  Temp 97.5 °F (36.4 °C) (Temporal Artery )   Resp 18  Ht 160 cm (62.99\")  Wt 52.6 kg (115 lb 15.4 oz)  LMP 03/17/1989 (Within Months) Comment: Postmenopausal  BMI 20.55 kg/m2    GENERAL:  Not in any distress.    HEENT:  Normocephalic, Atraumatic.Mild " Conjunctival pallor. No icterus.  No Facial Asymmetry noted.    NECK:  No adenopathy. No JVD.    RESPIRATORY:  Fair air entry bilateral. No rhonchi or wheezing.    CARDIOVASCULAR:  S1, S2. Regular rate and rhythm. No murmur or gallop appreciated.    ABDOMEN:  Soft, obese, nontender. Bowel sounds present in all four quadrants.  No organomegaly appreciated.    EXTREMITIES:  No edema.No Calf Tenderness.    NEUROLOGIC:  Alert, awake and oriented ×3.      SKIN : No new skin lesion identified  DIAGNOSTIC DATA:    Glucose   Date Value Ref Range Status   12/06/2017 104 (H) 60 - 100 mg/dL Final     Sodium   Date Value Ref Range Status   12/06/2017 141 137 - 145 mmol/L Final     Potassium   Date Value Ref Range Status   12/06/2017 3.6 3.5 - 5.1 mmol/L Final     CO2   Date Value Ref Range Status   12/06/2017 32.0 (H) 22.0 - 31.0 mmol/L Final     Chloride   Date Value Ref Range Status   12/06/2017 98 95 - 110 mmol/L Final     Anion Gap   Date Value Ref Range Status   12/06/2017 11.0 5.0 - 15.0 mmol/L Final     Creatinine   Date Value Ref Range Status   12/06/2017 1.04 (H) 0.50 - 1.00 mg/dL Final     BUN   Date Value Ref Range Status   12/06/2017 15 7 - 21 mg/dL Final     BUN/Creatinine Ratio   Date Value Ref Range Status   12/06/2017 14.4 7.0 - 25.0 Final     Calcium   Date Value Ref Range Status   12/06/2017 9.3 8.4 - 10.2 mg/dL Final     Alkaline Phosphatase   Date Value Ref Range Status   12/06/2017 77 38 - 126 U/L Final     Total Protein   Date Value Ref Range Status   12/06/2017 7.3 6.3 - 8.6 g/dL Final     ALT (SGPT)   Date Value Ref Range Status   12/06/2017 27 9 - 52 U/L Final     AST (SGOT)   Date Value Ref Range Status   12/06/2017 45 (H) 14 - 36 U/L Final     Total Bilirubin   Date Value Ref Range Status   12/06/2017 0.9 0.2 - 1.3 mg/dL Final     Albumin   Date Value Ref Range Status   12/06/2017 4.20 3.40 - 4.80 g/dL Final     Globulin   Date Value Ref Range Status   12/06/2017 3.1 2.3 - 3.5 gm/dL Final     A/G  Ratio   Date Value Ref Range Status   12/06/2017 1.4 1.1 - 1.8 g/dL Final     Lab Results   Component Value Date    WBC 3.81 12/06/2017    HGB 13.3 12/06/2017    HCT 39.8 12/06/2017    MCV 94.1 12/06/2017     12/06/2017     Lab Results   Component Value Date    NEUTROABS 2.15 12/06/2017     No results found for: , LABCA2, AFPTM, HCGQUANT, , CHROMGRNA, [id number], CEA, REFLABREPO]        RADIOLOGY DATA :    CT C/A/P 12/4/2017;      1. Stable postsurgical changes consistent with prior  esophagectomy and gastric pull-through procedure.  2. Stable medial and lateral segment left lobe of liver small  subcentimeter flash hemangiomas.  3. Otherwise unremarkable CT of the chest abdomen and pelvis    ASSESSMENT AND PLAN:      1. History of moderately differentiated squamous cell cancer of esophagus, Stage II, underwent neoadjuvant concurrent chemotherapy and radiation therapy followed by surgery and had a complete CR per pathology. At this time she remain on surveillance. She just had CT scan that was unremarkable for any disease recurrence, she will return to clinic in 6 months with repeat labs. She is still following with CT surgery and GI clinics. Scheduled for repeat esophageal dilatation next week.    2. Health maintenance, she does not smoke and is having a colonoscopy next week.    3. Hypertension, BP today is 106/76.         This document has been signed by AKASH Barton on December 6, 2017 11:38 AM

## 2017-12-18 ENCOUNTER — ANESTHESIA (OUTPATIENT)
Dept: GASTROENTEROLOGY | Facility: HOSPITAL | Age: 57
End: 2017-12-18

## 2017-12-18 ENCOUNTER — HOSPITAL ENCOUNTER (OUTPATIENT)
Facility: HOSPITAL | Age: 57
Setting detail: HOSPITAL OUTPATIENT SURGERY
Discharge: HOME | End: 2017-12-18
Attending: INTERNAL MEDICINE | Admitting: INTERNAL MEDICINE

## 2017-12-18 ENCOUNTER — ANESTHESIA EVENT (OUTPATIENT)
Dept: GASTROENTEROLOGY | Facility: HOSPITAL | Age: 57
End: 2017-12-18

## 2017-12-18 VITALS
HEIGHT: 63 IN | BODY MASS INDEX: 20.48 KG/M2 | RESPIRATION RATE: 20 BRPM | WEIGHT: 115.56 LBS | TEMPERATURE: 96.4 F | SYSTOLIC BLOOD PRESSURE: 113 MMHG | OXYGEN SATURATION: 98 % | DIASTOLIC BLOOD PRESSURE: 79 MMHG | HEART RATE: 68 BPM

## 2017-12-18 PROCEDURE — 25010000002 PROPOFOL 10 MG/ML EMULSION: Performed by: NURSE ANESTHETIST, CERTIFIED REGISTERED

## 2017-12-18 PROCEDURE — 45378 DIAGNOSTIC COLONOSCOPY: CPT | Performed by: INTERNAL MEDICINE

## 2017-12-18 PROCEDURE — 43248 EGD GUIDE WIRE INSERTION: CPT | Performed by: INTERNAL MEDICINE

## 2017-12-18 PROCEDURE — 25010000002 GLUCAGON (HUMAN RECOMBINANT) 1 MG RECONSTITUTED SOLUTION: Performed by: INTERNAL MEDICINE

## 2017-12-18 RX ORDER — DEXAMETHASONE SODIUM PHOSPHATE 4 MG/ML
8 INJECTION, SOLUTION INTRA-ARTICULAR; INTRALESIONAL; INTRAMUSCULAR; INTRAVENOUS; SOFT TISSUE ONCE AS NEEDED
Status: DISCONTINUED | OUTPATIENT
Start: 2017-12-18 | End: 2017-12-18 | Stop reason: HOSPADM

## 2017-12-18 RX ORDER — PROMETHAZINE HYDROCHLORIDE 25 MG/1
25 SUPPOSITORY RECTAL ONCE AS NEEDED
Status: DISCONTINUED | OUTPATIENT
Start: 2017-12-18 | End: 2017-12-18 | Stop reason: HOSPADM

## 2017-12-18 RX ORDER — PROMETHAZINE HYDROCHLORIDE 25 MG/ML
12.5 INJECTION, SOLUTION INTRAMUSCULAR; INTRAVENOUS ONCE AS NEEDED
Status: DISCONTINUED | OUTPATIENT
Start: 2017-12-18 | End: 2017-12-18 | Stop reason: HOSPADM

## 2017-12-18 RX ORDER — ONDANSETRON 2 MG/ML
4 INJECTION INTRAMUSCULAR; INTRAVENOUS ONCE AS NEEDED
Status: DISCONTINUED | OUTPATIENT
Start: 2017-12-18 | End: 2017-12-18 | Stop reason: HOSPADM

## 2017-12-18 RX ORDER — LIDOCAINE HYDROCHLORIDE 10 MG/ML
INJECTION, SOLUTION INFILTRATION; PERINEURAL AS NEEDED
Status: DISCONTINUED | OUTPATIENT
Start: 2017-12-18 | End: 2017-12-18 | Stop reason: SURG

## 2017-12-18 RX ORDER — PROPOFOL 10 MG/ML
VIAL (ML) INTRAVENOUS AS NEEDED
Status: DISCONTINUED | OUTPATIENT
Start: 2017-12-18 | End: 2017-12-18 | Stop reason: SURG

## 2017-12-18 RX ORDER — PROMETHAZINE HYDROCHLORIDE 25 MG/1
25 TABLET ORAL ONCE AS NEEDED
Status: DISCONTINUED | OUTPATIENT
Start: 2017-12-18 | End: 2017-12-18 | Stop reason: HOSPADM

## 2017-12-18 RX ORDER — DEXTROSE AND SODIUM CHLORIDE 5; .45 G/100ML; G/100ML
20 INJECTION, SOLUTION INTRAVENOUS CONTINUOUS
Status: DISCONTINUED | OUTPATIENT
Start: 2017-12-18 | End: 2017-12-18 | Stop reason: HOSPADM

## 2017-12-18 RX ADMIN — PROPOFOL 20 MG: 10 INJECTION, EMULSION INTRAVENOUS at 13:11

## 2017-12-18 RX ADMIN — PROPOFOL 50 MG: 10 INJECTION, EMULSION INTRAVENOUS at 13:19

## 2017-12-18 RX ADMIN — PROPOFOL 60 MG: 10 INJECTION, EMULSION INTRAVENOUS at 13:03

## 2017-12-18 RX ADMIN — PROPOFOL 20 MG: 10 INJECTION, EMULSION INTRAVENOUS at 13:05

## 2017-12-18 RX ADMIN — PROPOFOL 50 MG: 10 INJECTION, EMULSION INTRAVENOUS at 13:16

## 2017-12-18 RX ADMIN — DEXTROSE AND SODIUM CHLORIDE 20 ML/HR: 5; 450 INJECTION, SOLUTION INTRAVENOUS at 11:12

## 2017-12-18 RX ADMIN — PROPOFOL 50 MG: 10 INJECTION, EMULSION INTRAVENOUS at 13:07

## 2017-12-18 RX ADMIN — PROPOFOL 50 MG: 10 INJECTION, EMULSION INTRAVENOUS at 13:22

## 2017-12-18 RX ADMIN — PROPOFOL 50 MG: 10 INJECTION, EMULSION INTRAVENOUS at 13:14

## 2017-12-18 RX ADMIN — PROPOFOL 50 MG: 10 INJECTION, EMULSION INTRAVENOUS at 13:09

## 2017-12-18 RX ADMIN — LIDOCAINE HYDROCHLORIDE 50 MG: 10 INJECTION, SOLUTION INFILTRATION; PERINEURAL at 13:03

## 2017-12-18 NOTE — ANESTHESIA PREPROCEDURE EVALUATION
Anesthesia Evaluation     no history of anesthetic complications:  NPO Solid Status: > 8 hours  NPO Liquid Status: > 2 hours     Airway   Mallampati: II  TM distance: >3 FB  Neck ROM: full  no difficulty expected  Dental - normal exam     Pulmonary - normal exam   (-) COPD  Cardiovascular - normal exam    (+) hypertension well controlled,       Neuro/Psych  (+) psychiatric history Anxiety and Depression,    GI/Hepatic/Renal/Endo    (+)  GERD,     Musculoskeletal     (+) back pain,   Abdominal    Substance History      OB/GYN          Other      history of cancer remission                                    Anesthesia Plan    ASA 2     MAC     intravenous induction   Anesthetic plan and risks discussed with patient.

## 2017-12-18 NOTE — ANESTHESIA POSTPROCEDURE EVALUATION
Patient: Maddison Sheridan    Procedure Summary     Date Anesthesia Start Anesthesia Stop Room / Location    12/18/17 1301 1328 [facility] ENDOSCOPY 3 / [facility] ENDOSCOPY       Procedure Diagnosis Surgeon Provider    ESOPHAGOGASTRODUODENOSCOPY (N/A Esophagus); COLONOSCOPY (N/A ) Dysphagia, unspecified type; Encounter for screening colonoscopy  (Dysphagia, unspecified type [R13.10]) MD Melisa Vásquez CRNA          Anesthesia Type: MAC  Last vitals  BP   141/85 (12/18/17 1102)   Temp   98.1 °F (36.7 °C) (12/18/17 1102)   Pulse   62 (12/18/17 1102)   Resp   16 (12/18/17 1102)     SpO2   100 % (12/18/17 1102)     Post Anesthesia Care and Evaluation    Patient location during evaluation: bedside  Patient participation: complete - patient participated  Level of consciousness: responsive to verbal stimuli  Pain management: adequate  Airway patency: patent  Anesthetic complications: No anesthetic complications    Cardiovascular status: acceptable  Respiratory status: acceptable  Hydration status: acceptable

## 2017-12-19 DIAGNOSIS — C15.9 MALIGNANT NEOPLASM OF ESOPHAGUS, UNSPECIFIED LOCATION (HCC): Primary | ICD-10-CM

## 2018-01-11 ENCOUNTER — TELEPHONE (OUTPATIENT)
Dept: GENERAL RADIOLOGY | Facility: HOSPITAL | Age: 58
End: 2018-01-11

## 2018-01-15 DIAGNOSIS — F51.01 PRIMARY INSOMNIA: ICD-10-CM

## 2018-01-15 RX ORDER — HYDROXYZINE PAMOATE 25 MG/1
CAPSULE ORAL
Qty: 60 CAPSULE | Refills: 2 | OUTPATIENT
Start: 2018-01-15

## 2018-02-16 DIAGNOSIS — R12 HEART BURN: ICD-10-CM

## 2018-05-07 RX ORDER — LOPERAMIDE HYDROCHLORIDE 2 MG/1
CAPSULE ORAL
Qty: 180 CAPSULE | Refills: 6 | OUTPATIENT
Start: 2018-05-07

## 2018-05-10 ENCOUNTER — OFFICE VISIT (OUTPATIENT)
Dept: GASTROENTEROLOGY | Facility: CLINIC | Age: 58
End: 2018-05-10

## 2018-05-10 VITALS
BODY MASS INDEX: 21.33 KG/M2 | DIASTOLIC BLOOD PRESSURE: 82 MMHG | WEIGHT: 120.4 LBS | SYSTOLIC BLOOD PRESSURE: 130 MMHG | HEART RATE: 82 BPM | HEIGHT: 63 IN

## 2018-05-10 DIAGNOSIS — K22.2 ESOPHAGEAL STRICTURE: ICD-10-CM

## 2018-05-10 DIAGNOSIS — R13.19 ESOPHAGEAL DYSPHAGIA: Primary | ICD-10-CM

## 2018-05-10 DIAGNOSIS — Z85.01 PERSONAL HISTORY OF ESOPHAGEAL CANCER: ICD-10-CM

## 2018-05-10 PROCEDURE — 99213 OFFICE O/P EST LOW 20 MIN: CPT | Performed by: NURSE PRACTITIONER

## 2018-05-10 RX ORDER — DICYCLOMINE HCL 20 MG
20 TABLET ORAL EVERY 6 HOURS
COMMUNITY
End: 2018-12-18

## 2018-05-10 RX ORDER — HYDROXYZINE PAMOATE 25 MG/1
25 CAPSULE ORAL
Refills: 11 | COMMUNITY
Start: 2018-03-14 | End: 2019-08-22

## 2018-05-10 RX ORDER — HYDROCODONE BITARTRATE AND ACETAMINOPHEN 10; 325 MG/1; MG/1
TABLET ORAL
Refills: 0 | COMMUNITY
Start: 2018-05-07 | End: 2019-08-22

## 2018-05-10 RX ORDER — HYDROCHLOROTHIAZIDE 12.5 MG/1
12.5 TABLET ORAL DAILY
Refills: 11 | COMMUNITY
Start: 2018-03-16 | End: 2019-12-12

## 2018-05-10 RX ORDER — DEXTROSE AND SODIUM CHLORIDE 5; .45 G/100ML; G/100ML
30 INJECTION, SOLUTION INTRAVENOUS CONTINUOUS PRN
Status: CANCELLED | OUTPATIENT
Start: 2018-06-14

## 2018-05-10 RX ORDER — POTASSIUM CITRATE 5 MEQ/1
TABLET, EXTENDED RELEASE ORAL DAILY
COMMUNITY
End: 2018-05-30 | Stop reason: ALTCHOICE

## 2018-05-10 NOTE — PROGRESS NOTES
Chief Complaint   Patient presents with   • Colonoscopy     results   • EGD     results   • Difficulty Swallowing       Subjective    Maddison Sheridan is a 58 y.o. female. she is here today for follow-up.  58-year-old female presents to discuss EGD and colonoscopy results.  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  She follows up with oncology routinely.  EGD noted a benign-appearing stenosis dilated to 54 [place].  Partial esophagectomy anastomosis with some mid esophagus.  Entire examined stomach and duodenum was normal.  No specimens were collected.  Colonoscopy had a poor prep and noted many small and large mouth diverticula throughout the colon.  Hemorrhoids were seen.  Patient states she was confused about how to drink the prep and drink it at the wrong time of day.    Difficulty Swallowing   This is a chronic problem. The current episode started more than 1 year ago. The problem occurs daily. The problem has been waxing and waning. Associated symptoms include abdominal pain (mild ) and vomiting (sometimes when food hanges ). Pertinent negatives include no arthralgias, chills, diaphoresis, fatigue, fever, nausea or sore throat. The symptoms are aggravated by swallowing. Treatments tried: Dilated about 6x takes pepcid daily.  The treatment provided mild relief.     Plan; schedule patient for EGD due to dysphagia likely recurrent esophageal stricture due to history of radiation and partial esophagectomy.  She will need repeat colonoscopy December 2018 due to poor prep.  Recommend daily fiber supplementation for diverticular disease.       The following portions of the patient's history were reviewed and updated as appropriate:   Past Medical History:   Diagnosis Date   • Abnormal weight loss    • Anxiety    •  Atrophic vaginitis    • Depression    • Dysphagia     pharyngoesophageal phase      • Encounter for surgical aftercare following surgery of digestive system     Walter Michael (thoracic esophagectomy, lymphadenectomly, Bronchoscopy, Placement on Q. 3/1/16      • History of esophagogastroduodenoscopy     Normal hypopharynx.A tumor was found in the middle third of the esophagus.Mul.biopsies taken.Normal GE junction.Mild nonerosive gastritis in antrum.Biopsies taken.Normal pylorus and duodenum. 09/24/2015       • History of hysteroscopy     Hysteroscopy, dilation and curettage, and repair of vaginal laceration. 08/03/2013       • History of substance abuse    • Hypertension    • Lumbosacral strain    • Myopia    • Presbyopia    • Primary malignant neoplasm of thoracic part of esophagus     Squamous cell   • Tobacco dependence     1 ppd     Past Surgical History:   Procedure Laterality Date   • ABDOMINAL SURGERY  03/01/2016    Abdominal portion of an Perley Michael esophagogastrectomy with jejunostomy tube placement. Esophageal carcinoma.   • COLONOSCOPY N/A 12/18/2017    Procedure: COLONOSCOPY;  Surgeon: Babar Connell MD;  Location: [facility] ENDOSCOPY;  Service:    • ENDOSCOPY N/A 2/3/2017    Procedure: ESOPHAGOGASTRODUODENOSCOPY;  Surgeon: Babar Connell MD;  Location: [facility] ENDOSCOPY;  Service:    • ENDOSCOPY N/A 5/22/2017    Procedure: ESOPHAGOGASTRODUODENOSCOPY;  Surgeon: Babar Connell MD;  Location: [facility] ENDOSCOPY;  Service:    • ENDOSCOPY N/A 9/22/2017    Procedure: ESOPHAGOGASTRODUODENOSCOPY;  Surgeon: Babar Connell MD;  Location: [facility] ENDOSCOPY;  Service:    • ENDOSCOPY N/A 12/18/2017    Procedure: ESOPHAGOGASTRODUODENOSCOPY;  Surgeon: Babar Connell MD;  Location: [facility] ENDOSCOPY;  Service:    • ESOPHAGUS SURGERY      Thoracic esophagectomy (Perley Michael type).Thoracic lymphadenectomy.Fiberoptic bronchoscopy.Placement of On Q pain pump.Jejunostomy tube placement.Pyloromyotomy. 03/01/2016      • LUMBAR DISC  SURGERY     • UPPER GASTROINTESTINAL ENDOSCOPY  02/03/2017   • UPPER GASTROINTESTINAL ENDOSCOPY  05/22/2017   • UPPER GASTROINTESTINAL ENDOSCOPY  09/22/2017     Family History   Problem Relation Age of Onset   • Ovarian cancer Mother    • Cancer Mother    • Osteoporosis Other    • Heart disease Other    • Diabetes Other    • Cancer Other      Colorectal-Parent   • Asthma Other    • Asthma Sister    • Diabetes Sister    • Hypertension Sister    • Alcohol abuse Brother    • Cancer Maternal Uncle      OB History     No data available        Current Outpatient Prescriptions   Medication Sig Dispense Refill   • albuterol (PROVENTIL HFA;VENTOLIN HFA) 108 (90 BASE) MCG/ACT inhaler Inhale 2 puffs Every 4 (Four) Hours As Needed for wheezing. 18 g 11   • amLODIPine (NORVASC) 10 MG tablet Daily.     • buPROPion SR (WELLBUTRIN SR) 150 MG 12 hr tablet TAKE 1 TABLET BY MOUTH 2 TIMES A DAY. 60 tablet 5   • dicyclomine (BENTYL) 20 MG tablet Take 20 mg by mouth Every 6 (Six) Hours.     • famotidine (PEPCID) 20 MG tablet      • hydrochlorothiazide (HYDRODIURIL) 12.5 MG tablet Take 12.5 mg by mouth Daily.  11   • HYDROcodone-acetaminophen (NORCO)  MG per tablet TK 1 T PO TID PRN.  0   • hydrOXYzine (VISTARIL) 25 MG capsule Take 25 mg by mouth every night at bedtime.  11   • loperamide (IMODIUM A-D) 2 MG tablet Take 2 mg by mouth 3 (Three) Times a Day As Needed for Diarrhea.     • mirtazapine (REMERON) 15 MG tablet 15 mg Every Night.     • NEXIUM 24HR 20 MG capsule Take 2 capsules by mouth Daily. (Patient taking differently: Take 40 mg by mouth As Needed.) 60 capsule 5   • ondansetron ODT (ZOFRAN-ODT) 4 MG disintegrating tablet Take 1 tablet by mouth Every 6 (Six) Hours As Needed for nausea or vomiting. (Patient taking differently: Take 4 mg by mouth As Needed for Nausea or Vomiting.) 60 tablet 1   • potassium chloride (K-DUR,KLOR-CON) 20 MEQ CR tablet      • potassium citrate (UROCIT-K) 5 MEQ (540 MG) CR tablet Take  by mouth  "Daily.       No current facility-administered medications for this visit.      Allergies   Allergen Reactions   • Penicillins Hives   • Strawberry C [Ascorbate] Hives     Social History     Social History   • Marital status: Single     Social History Main Topics   • Smoking status: Former Smoker     Quit date: 2015   • Smokeless tobacco: Never Used   • Alcohol use No   • Drug use: No   • Sexual activity: Defer     Other Topics Concern   • Not on file       Review of Systems  Review of Systems   Constitutional: Negative for activity change, appetite change, chills, diaphoresis, fatigue, fever and unexpected weight change.   HENT: Positive for trouble swallowing. Negative for sore throat.    Respiratory: Positive for choking. Negative for shortness of breath.    Gastrointestinal: Positive for abdominal pain (mild ) and vomiting (sometimes when food hanges ). Negative for abdominal distention, anal bleeding, blood in stool, constipation, diarrhea, nausea and rectal pain.   Musculoskeletal: Negative for arthralgias.   Skin: Negative for pallor.   Neurological: Negative for light-headedness.        /82   Pulse 82   Ht 160 cm (62.99\")   Wt 54.6 kg (120 lb 6.4 oz)   LMP 03/17/1989 (Within Months) Comment: Postmenopausal  BMI 21.33 kg/m²     Objective    Physical Exam   Constitutional: She is oriented to person, place, and time. She appears well-developed and well-nourished. She is cooperative. No distress.   HENT:   Head: Normocephalic and atraumatic.   Neck: Normal range of motion. Neck supple. No thyromegaly present.   Cardiovascular: Normal rate, regular rhythm and normal heart sounds.    Pulmonary/Chest: Effort normal and breath sounds normal. She has no wheezes. She has no rhonchi. She has no rales.   Abdominal: Soft. Normal appearance and bowel sounds are normal. She exhibits no distension. There is no hepatosplenomegaly. There is tenderness in the right upper quadrant, epigastric area and left upper " quadrant. There is no rigidity and no guarding. No hernia.   Lymphadenopathy:     She has no cervical adenopathy.   Neurological: She is alert and oriented to person, place, and time.   Skin: Skin is warm, dry and intact. No rash noted. No pallor.   Psychiatric: She has a normal mood and affect. Her speech is normal.     Lab on 12/06/2017   Component Date Value Ref Range Status   • Glucose 12/06/2017 104* 60 - 100 mg/dL Final   • BUN 12/06/2017 15  7 - 21 mg/dL Final   • Creatinine 12/06/2017 1.04* 0.50 - 1.00 mg/dL Final   • Sodium 12/06/2017 141  137 - 145 mmol/L Final   • Potassium 12/06/2017 3.6  3.5 - 5.1 mmol/L Final   • Chloride 12/06/2017 98  95 - 110 mmol/L Final   • CO2 12/06/2017 32.0* 22.0 - 31.0 mmol/L Final   • Calcium 12/06/2017 9.3  8.4 - 10.2 mg/dL Final   • Total Protein 12/06/2017 7.3  6.3 - 8.6 g/dL Final   • Albumin 12/06/2017 4.20  3.40 - 4.80 g/dL Final   • ALT (SGPT) 12/06/2017 27  9 - 52 U/L Final   • AST (SGOT) 12/06/2017 45* 14 - 36 U/L Final   • Alkaline Phosphatase 12/06/2017 77  38 - 126 U/L Final   • Total Bilirubin 12/06/2017 0.9  0.2 - 1.3 mg/dL Final   • eGFR   Amer 12/06/2017 66  51 - 120 mL/min/1.73 Final   • Globulin 12/06/2017 3.1  2.3 - 3.5 gm/dL Final   • A/G Ratio 12/06/2017 1.4  1.1 - 1.8 g/dL Final   • BUN/Creatinine Ratio 12/06/2017 14.4  7.0 - 25.0 Final   • Anion Gap 12/06/2017 11.0  5.0 - 15.0 mmol/L Final   • WBC 12/06/2017 3.81  3.20 - 9.80 10*3/mm3 Final   • RBC 12/06/2017 4.23  3.77 - 5.16 10*6/mm3 Final   • Hemoglobin 12/06/2017 13.3  12.0 - 15.5 g/dL Final   • Hematocrit 12/06/2017 39.8  35.0 - 45.0 % Final   • MCV 12/06/2017 94.1  80.0 - 98.0 fL Final   • MCH 12/06/2017 31.4  26.5 - 34.0 pg Final   • MCHC 12/06/2017 33.4  31.4 - 36.0 g/dL Final   • RDW 12/06/2017 12.9  11.5 - 14.5 % Final   • RDW-SD 12/06/2017 44.0  36.4 - 46.3 fl Final   • MPV 12/06/2017 11.2  8.0 - 12.0 fL Final   • Platelets 12/06/2017 212  150 - 450 10*3/mm3 Final   • Neutrophil %  12/06/2017 56.4  37.0 - 80.0 % Final   • Lymphocyte % 12/06/2017 28.1  10.0 - 50.0 % Final   • Monocyte % 12/06/2017 13.1* 0.0 - 12.0 % Final   • Eosinophil % 12/06/2017 1.6  0.0 - 7.0 % Final   • Basophil % 12/06/2017 0.5  0.0 - 2.0 % Final   • Immature Grans % 12/06/2017 0.3  0.0 - 0.5 % Final   • Neutrophils, Absolute 12/06/2017 2.15  2.00 - 8.60 10*3/mm3 Final   • Lymphocytes, Absolute 12/06/2017 1.07  0.60 - 4.20 10*3/mm3 Final   • Monocytes, Absolute 12/06/2017 0.50  0.00 - 0.90 10*3/mm3 Final   • Eosinophils, Absolute 12/06/2017 0.06  0.00 - 0.70 10*3/mm3 Final   • Basophils, Absolute 12/06/2017 0.02  0.00 - 0.20 10*3/mm3 Final   • Immature Grans, Absolute 12/06/2017 0.01  0.00 - 0.02 10*3/mm3 Final     Assessment/Plan      1. Esophageal dysphagia    2. Esophageal stricture    3. Personal history of esophageal cancer    .       Orders placed during this encounter include:    ESOPHAGOGASTRODUODENOSCOPY possible dilation (N/A)    Review and/or summary of lab tests, radiology, procedures, medications. Review and summary of old records and obtaining of history. The risks and benefits of my recommendations, as well as other treatment options were discussed with the patient today. Questions were answered.    No orders of the defined types were placed in this encounter.      Follow-up: Return for Recheck after procedure.          This document has been electronically signed by AKASH Gao on May 10, 2018 5:01 PM             Results for orders placed or performed in visit on 12/06/17   CBC Auto Differential   Result Value Ref Range    WBC 3.81 3.20 - 9.80 10*3/mm3    RBC 4.23 3.77 - 5.16 10*6/mm3    Hemoglobin 13.3 12.0 - 15.5 g/dL    Hematocrit 39.8 35.0 - 45.0 %    MCV 94.1 80.0 - 98.0 fL    MCH 31.4 26.5 - 34.0 pg    MCHC 33.4 31.4 - 36.0 g/dL    RDW 12.9 11.5 - 14.5 %    RDW-SD 44.0 36.4 - 46.3 fl    MPV 11.2 8.0 - 12.0 fL    Platelets 212 150 - 450 10*3/mm3    Neutrophil % 56.4 37.0 - 80.0 %    Lymphocyte  % 28.1 10.0 - 50.0 %    Monocyte % 13.1 (H) 0.0 - 12.0 %    Eosinophil % 1.6 0.0 - 7.0 %    Basophil % 0.5 0.0 - 2.0 %    Immature Grans % 0.3 0.0 - 0.5 %    Neutrophils, Absolute 2.15 2.00 - 8.60 10*3/mm3    Lymphocytes, Absolute 1.07 0.60 - 4.20 10*3/mm3    Monocytes, Absolute 0.50 0.00 - 0.90 10*3/mm3    Eosinophils, Absolute 0.06 0.00 - 0.70 10*3/mm3    Basophils, Absolute 0.02 0.00 - 0.20 10*3/mm3    Immature Grans, Absolute 0.01 0.00 - 0.02 10*3/mm3   Comprehensive Metabolic Panel   Result Value Ref Range    Glucose 104 (H) 60 - 100 mg/dL    BUN 15 7 - 21 mg/dL    Creatinine 1.04 (H) 0.50 - 1.00 mg/dL    Sodium 141 137 - 145 mmol/L    Potassium 3.6 3.5 - 5.1 mmol/L    Chloride 98 95 - 110 mmol/L    CO2 32.0 (H) 22.0 - 31.0 mmol/L    Calcium 9.3 8.4 - 10.2 mg/dL    Total Protein 7.3 6.3 - 8.6 g/dL    Albumin 4.20 3.40 - 4.80 g/dL    ALT (SGPT) 27 9 - 52 U/L    AST (SGOT) 45 (H) 14 - 36 U/L    Alkaline Phosphatase 77 38 - 126 U/L    Total Bilirubin 0.9 0.2 - 1.3 mg/dL    eGFR  African Amer 66 51 - 120 mL/min/1.73    Globulin 3.1 2.3 - 3.5 gm/dL    A/G Ratio 1.4 1.1 - 1.8 g/dL    BUN/Creatinine Ratio 14.4 7.0 - 25.0    Anion Gap 11.0 5.0 - 15.0 mmol/L   Results for orders placed or performed in visit on 05/19/17   Specimen Status Report   Result Value Ref Range    Specimen Status Comment    Pain Management Profile (13 Drugs) Urine   Result Value Ref Range    Amphetamine, Urine Qual Negative Uphoms=4866 ng/mL    Barbiturates Screen, Urine Negative Pmtgfz=162 ng/mL    Benzodiazepine Screen, Urine Negative Mtovix=723 ng/mL    THC Screen, Urine Negative Cutoff=20 ng/mL    Cocaine Screen, Urine Positive (A) Kvqkcb=957    Opiate Screen, Urine Negative Tccqef=245 ng/mL    Oxycodone/Oxymorphone, Urine Negative Ebfoyv=368 ng/mL    Phencyclidine (PCP), Urine Negative Cutoff=25 ng/mL    Methadone Screen, Urine Negative Urdzys=897 ng/mL    Propoxyphene Screen Negative Gbtvzt=899 ng/mL    Meperidine, Urine Negative Shsngb=191  ng/mL    Fentanyl, Urine Negative Mcykac=8866 pg/mL    Tramadol Screen, Urine Negative Oevqxc=271 ng/mL    Creatinine, Urine 135.9 20.0 - 300.0 mg/dL    Specific Gravity, UA 1.026     pH, UA 8.8 4.5 - 8.9    Please note Comment    Opiates Confirmation, Urine   Result Value Ref Range    Opiates Positive (A) ng/mL    Codeine, Meconium Negative Vmekrn=621    Morphine, Urine Negative Ezgwiu=790    Hydromorphone Negative Tsxtbr=898    Hydrocodone UR Positive (A)     Hydrocodone Confirm 497 Nkijzq=271 ng/mL    Please note Comment    Results for orders placed or performed in visit on 04/20/17   CBC Auto Differential   Result Value Ref Range    WBC 5.37 3.20 - 9.80 10*3/mm3    RBC 3.85 3.77 - 5.16 10*6/mm3    Hemoglobin 12.6 12.0 - 15.5 g/dL    Hematocrit 36.4 35.0 - 45.0 %    MCV 94.5 80.0 - 98.0 fL    MCH 32.7 26.5 - 34.0 pg    MCHC 34.6 31.4 - 36.0 g/dL    RDW 13.8 11.5 - 14.5 %    RDW-SD 47.6 (H) 36.4 - 46.3 fl    MPV 10.8 8.0 - 12.0 fL    Platelets 171 150 - 450 10*3/mm3    Neutrophil % 71.9 37.0 - 80.0 %    Lymphocyte % 16.8 10.0 - 50.0 %    Monocyte % 10.2 0.0 - 12.0 %    Eosinophil % 0.9 0.0 - 7.0 %    Basophil % 0.2 0.0 - 2.0 %    Immature Grans % 0.0 0.0 - 0.5 %    Neutrophils, Absolute 3.86 2.00 - 8.60 10*3/mm3    Lymphocytes, Absolute 0.90 0.60 - 4.20 10*3/mm3    Monocytes, Absolute 0.55 0.00 - 0.90 10*3/mm3    Eosinophils, Absolute 0.05 0.00 - 0.70 10*3/mm3    Basophils, Absolute 0.01 0.00 - 0.20 10*3/mm3    Immature Grans, Absolute 0.00 0.00 - 0.02 10*3/mm3   Comprehensive Metabolic Panel   Result Value Ref Range    Glucose 102 (H) 60 - 100 mg/dL    BUN 14 7 - 21 mg/dL    Creatinine 0.73 0.50 - 1.00 mg/dL    Sodium 139 137 - 145 mmol/L    Potassium 3.5 3.5 - 5.1 mmol/L    Chloride 101 95 - 110 mmol/L    CO2 28.0 22.0 - 31.0 mmol/L    Calcium 8.6 8.4 - 10.2 mg/dL    Total Protein 6.9 6.3 - 8.6 g/dL    Albumin 3.80 3.40 - 4.80 g/dL    ALT (SGPT) 20 9 - 52 U/L    AST (SGOT) 18 14 - 36 U/L    Alkaline Phosphatase  73 38 - 126 U/L    Total Bilirubin 1.0 0.2 - 1.3 mg/dL    eGFR  [place] Amer 99 51 - 120 mL/min/1.73    Globulin 3.1 2.3 - 3.5 gm/dL    A/G Ratio 1.2 1.1 - 1.8 g/dL    BUN/Creatinine Ratio 19.2 7.0 - 25.0    Anion Gap 10.0 5.0 - 15.0 mmol/L     *Note: Due to a large number of results and/or encounters for the requested time period, some results have not been displayed. A complete set of results can be found in Results Review.

## 2018-05-10 NOTE — PATIENT INSTRUCTIONS
Dysphagia  Dysphagia is trouble swallowing. This condition occurs when solids and liquids stick in a person's throat on the way down to the stomach, or when food takes longer to get to the stomach. You may have problems swallowing food, liquids, or both. You may also have pain while trying to swallow. It may take you more time and effort to swallow something.  What are the causes?  This condition is caused by:  · Problems with the muscles. They may make it difficult for you to move food and liquids through the tube that connects your mouth to your stomach (esophagus). You may have ulcers, scar tissue, or inflammation that blocks the normal passage of food and liquids. Causes of these problems include:  ¨ Acid reflux from your stomach into your esophagus (gastroesophageal reflux).  ¨ Infections.  ¨ Radiation treatment for cancer.  ¨ Medicines taken without enough fluids to wash them down into your stomach.  · Nerve problems. These prevent signals from being sent to the muscles of your esophagus to squeeze (contract) and move what you swallow down to your stomach.  · Globus pharyngeus. This is a common problem that involves feeling like something is stuck in the throat or a sense of trouble with swallowing even though nothing is wrong with the swallowing passages.  · Stroke. This can affect the nerves and make it difficult to swallow.  · Certain conditions, such as cerebral palsy or Parkinson disease.  What are the signs or symptoms?  Common symptoms of this condition include:  · A feeling that solids or liquids are stuck in your throat on the way down to the stomach.  · Food taking too long to get to the stomach.  Other symptoms include:  · Food moving back from your stomach to your mouth (regurgitation).  · Noises coming from your throat.  · Chest discomfort with swallowing.  · A feeling of fullness when swallowing.  · Drooling, especially when the throat is blocked.  · Pain while  swallowing.  · Heartburn.  · Coughing or gagging while trying to swallow.  How is this diagnosed?  This condition is diagnosed by:  · Barium X-ray. In this test, you swallow a white substance (contrast medium)that sticks to the inside of your esophagus. X-ray images are then taken.  · Endoscopy. In this test, a flexible telescope is inserted down your throat to look at your esophagus and your stomach.  · CT scans and MRI.  How is this treated?  Treatment for dysphagia depends on the cause of the condition:  · If the dysphagia is caused by acid reflux or infection, medicines may be used. They may include antibiotics and heartburn medicines.  · If the dysphagia is caused by problems with your muscles, swallowing therapy may be used to help you strengthen your swallowing muscles. You may have to do specific exercises to strengthen the muscles or stretch them.  · If the dysphagia is caused by a blockage or mass, procedures to remove the blockage may be done. You may need surgery and a feeding tube.  You may need to make diet changes. Ask your health care provider for specific instructions.  Follow these instructions at home:  Eating and drinking   · Try to eat soft food that is easier to swallow.  · Follow any diet changes as told by your health care provider.  · Cut your food into small pieces and eat slowly.  · Eat and drink only when you are sitting upright.  · Do not drink alcohol or caffeine. If you need help quitting, ask your health care provider.  General instructions   · Check your weight every day to make sure you are not losing weight.  · Take over-the-counter and prescription medicines only as told by your health care provider.  · If you were prescribed an antibiotic medicine, take it as told by your health care provider. Do not stop taking the antibiotic even if you start to feel better.  · Do not use any products that contain nicotine or tobacco, such as cigarettes and e-cigarettes. If you need help  quitting, ask your health care provider.  · Keep all follow-up visits as told by your health care provider. This is important.  Contact a health care provider if:  · You lose weight because you cannot swallow.  · You cough when you drink liquids (aspiration).  · You cough up partially digested food.  Get help right away if:  · You cannot swallow your saliva.  · You have shortness of breath or a fever, or both.  · You have a hoarse voice and also have trouble swallowing.  Summary  · Dysphagia is trouble swallowing. This condition occurs when solids and liquids stick in a person's throat on the way down to the stomach, or when food takes longer to get to the stomach.  · Dysphagia has many possible causes and symptoms.  · Treatment for dysphagia depends on the cause of the condition.  This information is not intended to replace advice given to you by your health care provider. Make sure you discuss any questions you have with your health care provider.  Document Released: 12/15/2001 Document Revised: 12/07/2017 Document Reviewed: 12/07/2017  [employer] Interactive Patient Education © 2017 [employer]  MyPlate from [employer]  The general, healthful diet is based on the 2010 Dietary Guidelines for Americans. The amount of food you need to eat from each food group depends on your age, sex, and level of physical activity and can be individualized by a dietitian. Go to ChooseMyPlate.gov for more information.  What do I need to know about the MyPlate plan?  · Enjoy your food, but eat less.  · Avoid oversized portions.  ¨ ½ of your plate should include fruits and vegetables.  ¨ ¼ of your plate should be grains.  ¨ ¼ of your plate should be protein.  Grains   · Make at least half of your grains whole grains.  · For a 2,000 calorie daily food plan, eat 6 oz every day.  · 1 oz is about 1 slice bread, 1 cup cereal, or ½ cup cooked rice, cereal, or pasta.  Vegetables   · Make half your plate fruits and vegetables.  · For a 2,000  calorie daily food plan, eat 2½ cups every day.  · 1 cup is about 1 cup raw or cooked vegetables or vegetable juice or 2 cups raw leafy greens.  Fruits   · Make half your plate fruits and vegetables.  · For a 2,000 calorie daily food plan, eat 2 cups every day.  · 1 cup is about 1 cup fruit or 100% fruit juice or ½ cup dried fruit.  Protein   · For a 2,000 calorie daily food plan, eat 5½ oz every day.  · 1 oz is about 1 oz meat, poultry, or fish, ¼ cup cooked beans, 1 egg, 1 Tbsp peanut butter, or ½ oz nuts or seeds.  Dairy   · Switch to fat-free or low-fat (1%) milk.  · For a 2,000 calorie daily food plan, eat 3 cups every day.  · 1 cup is about 1 cup milk or yogurt or soy milk (soy beverage), 1½ oz natural cheese, or 2 oz processed cheese.  Fats, Oils, and Empty Calories   · Only small amounts of oils are recommended.  · Empty calories are calories from solid fats or added sugars.  · Compare sodium in foods like soup, bread, and frozen meals. Choose the foods with lower numbers.  · Drink water instead of sugary drinks.  What foods can I eat?  Grains   Whole grains such as whole wheat, quinoa, millet, and bulgur. Bread, rolls, and pasta made from whole grains. Brown or wild rice. Hot or cold cereals made from whole grains and without added sugar.  Vegetables   All fresh vegetables, especially fresh red, dark green, or orange vegetables. Peas and beans. Low-sodium frozen or canned vegetables prepared without added salt. Low-sodium vegetable juices.  Fruits   All fresh, frozen, and dried fruits. Canned fruit packed in water or fruit juice without added sugar. Fruit juices without added sugar.  Meats and Other Protein Sources   Boiled, baked, or grilled lean meat trimmed of fat. Skinless poultry. Fresh seafood and shellfish. Canned seafood packed in water. Unsalted nuts and unsalted nut butters. Tofu. Dried beans and pea. Eggs.  Dairy   Low-fat or fat-free milk, yogurt, and cheeses.  Sweets and Desserts   Frozen  desserts made from low-fat milk.  Fats and Oils   Olive, peanut, and canola oils and margarine. Salad dressing and mayonnaise made from these oils.  Other   Soups and casseroles made from allowed ingredients and without added fat or salt.  The items listed above may not be a complete list of recommended foods or beverages. Contact your dietitian for more options.   What foods are not recommended?  Grains   Sweetened, low-fiber cereals. Packaged baked goods. Snack crackers and chips. Cheese crackers, butter crackers, and biscuits. Frozen waffles, sweet breads, doughnuts, pastries, packaged baking mixes, pancakes, cakes, and cookies.  Vegetables   Regular canned or frozen vegetables or vegetables prepared with salt. Canned tomatoes. Canned tomato sauce. Fried vegetables. Vegetables in cream sauce or cheese sauce.  Fruits   Fruits packed in syrup or made with added sugar.  Meats and Other Protein Sources   Marbled or fatty meats such as ribs. Poultry with skin. Fried meats, poultry, eggs, or fish. Sausages, hot dogs, and deli meats such as pastrami, bologna, or salami.  Dairy   Whole milk, cream, cheeses made from whole milk, sour cream. Ice cream or yogurt made from whole milk or with added sugar.  Beverages   For adults, no more than one alcoholic drink per day. Regular soft drinks or other sugary beverages. Juice drinks.  Sweets and Desserts   Sugary or fatty desserts, candy, and other sweets.  Fats and Oils   Solid shortening or partially hydrogenated oils. Solid margarine. Margarine that contains trans fats. Butter.  The items listed above may not be a complete list of foods and beverages to avoid. Contact your dietitian for more information.   This information is not intended to replace advice given to you by your health care provider. Make sure you discuss any questions you have with your health care provider.  Document Released: 01/06/2009 Document Revised: 05/25/2017 Document Reviewed: 11/26/2014  [employer]  Interactive Patient Education © 2017 [employer].  BMI for Adults  Body mass index (BMI) is a number that is calculated from a person's weight and height. In most adults, the number is used to find how much of an adult's weight is made up of fat. BMI is not as accurate as a direct measure of body fat.  How is BMI calculated?  BMI is calculated by dividing weight in kilograms by height in meters squared. It can also be calculated by dividing weight in pounds by height in inches squared, then multiplying the resulting number by 703. Charts are available to help you find your BMI quickly and easily without doing this calculation.  How is BMI interpreted?  Health care professionals use BMI charts to identify whether an adult is underweight, at a normal weight, or overweight based on the following guidelines:  · Underweight: BMI less than 18.5.  · Normal weight: BMI between 18.5 and 24.9.  · Overweight: BMI between 25 and 29.9.  · Obese: BMI of 30 and above.  BMI is usually interpreted the same for males and females.  Weight includes both fat and muscle, so someone with a muscular build, such as an athlete, may have a BMI that is higher than 24.9. In cases like these, BMI may not accurately depict body fat. To determine if excess body fat is the cause of a BMI of 25 or higher, further assessments may need to be done by a health care provider.  Why is BMI a useful tool?  BMI is used to identify a possible weight problem that may be related to a medical problem or may increase the risk for medical problems. BMI can also be used to promote changes to reach a healthy weight.  This information is not intended to replace advice given to you by your health care provider. Make sure you discuss any questions you have with your health care provider.  Document Released: 08/29/2005 Document Revised: 04/27/2017 Document Reviewed: 05/15/2015  [employer] Interactive Patient Education © 2017 [employer] Inc.

## 2018-05-12 ENCOUNTER — HOSPITAL ENCOUNTER (EMERGENCY)
Facility: HOSPITAL | Age: 58
Discharge: HOME | End: 2018-05-12
Admitting: NURSE PRACTITIONER

## 2018-05-12 VITALS
OXYGEN SATURATION: 96 % | BODY MASS INDEX: 21.26 KG/M2 | SYSTOLIC BLOOD PRESSURE: 146 MMHG | DIASTOLIC BLOOD PRESSURE: 75 MMHG | HEART RATE: 86 BPM | TEMPERATURE: 99.3 F | RESPIRATION RATE: 18 BRPM | HEIGHT: 63 IN | WEIGHT: 120 LBS
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
## 2018-05-12 DIAGNOSIS — T14.8XXA ANIMAL BITE: Primary | ICD-10-CM

## 2018-05-12 PROCEDURE — 99283 EMERGENCY DEPT VISIT LOW MDM: CPT

## 2018-05-12 RX ORDER — HYDROCODONE BITARTRATE AND ACETAMINOPHEN 5; 325 MG/1; MG/1
1 TABLET ORAL ONCE
Status: COMPLETED | OUTPATIENT
Start: 2018-05-12 | End: 2018-05-12

## 2018-05-12 RX ORDER — CLINDAMYCIN HYDROCHLORIDE 150 MG/1
300 CAPSULE ORAL ONCE
Status: COMPLETED | OUTPATIENT
Start: 2018-05-12 | End: 2018-05-12

## 2018-05-12 RX ORDER — CLINDAMYCIN HYDROCHLORIDE 300 MG/1
300 CAPSULE ORAL 3 TIMES DAILY
Qty: 15 CAPSULE | Refills: 0 | Status: SHIPPED | OUTPATIENT
Start: 2018-05-12 | End: 2018-05-17

## 2018-05-12 RX ADMIN — HYDROCODONE BITARTRATE AND ACETAMINOPHEN 1 TABLET: 5; 325 TABLET ORAL at 19:10

## 2018-05-12 RX ADMIN — CLINDAMYCIN HYDROCHLORIDE 300 MG: 150 CAPSULE ORAL at 19:10

## 2018-05-13 NOTE — ED PROVIDER NOTES
Subjective   Patient emergency department complaining of dog bite.  She reports that her neighbor's dog bit her in her left lower leg.  The attack was unprovoked.  The patient asked the owner if she should go to the hospital and the owner's landed on her face and said yes.  Contacted [facility] so they can check if the owner was truthful about treatment or not.  We also needed to know if the and was had his shots.  [place] [employer] reports that the and was not had shots.  We'll treat with a rabies vaccine and immunoglobulin in the emergency department and the patient will follow up with primary care or health Department.        History provided by:  Patient   used: No        Review of Systems   Constitutional: Negative for activity change, chills, fatigue and fever.   HENT: Negative for congestion, ear pain, hearing loss, mouth sores, nosebleeds, postnasal drip, sinus pain, sinus pressure and voice change.    Eyes: Negative for discharge.   Respiratory: Negative for apnea, cough and wheezing.    Cardiovascular: Negative for chest pain and palpitations.   Gastrointestinal: Negative for abdominal distention, abdominal pain, constipation, nausea and vomiting.   Endocrine: Negative for cold intolerance.   Musculoskeletal: Positive for arthralgias.   Skin: Positive for wound. Negative for rash.   Allergic/Immunologic: Negative for immunocompromised state.   Neurological: Negative for dizziness, weakness and numbness.   Hematological: Negative for adenopathy.   Psychiatric/Behavioral: Negative for confusion.   All other systems reviewed and are negative.      Past Medical History:   Diagnosis Date   • Abnormal weight loss    • Anxiety    • Atrophic vaginitis    • Depression    • Dysphagia     pharyngoesophageal phase      • Encounter for surgical aftercare following surgery of digestive system     Walter Michael (thoracic esophagectomy, lymphadenectomly, Bronchoscopy, Placement on Q. 3/1/16      •  History of esophagogastroduodenoscopy     Normal hypopharynx.A tumor was found in the middle third of the esophagus.Mul.biopsies taken.Normal GE junction.Mild nonerosive gastritis in antrum.Biopsies taken.Normal pylorus and duodenum. 09/24/2015       • History of hysteroscopy     Hysteroscopy, dilation and curettage, and repair of vaginal laceration. 08/03/2013       • History of substance abuse    • Hypertension    • Lumbosacral strain    • Myopia    • Presbyopia    • Primary malignant neoplasm of thoracic part of esophagus     Squamous cell   • Tobacco dependence     1 ppd       Allergies   Allergen Reactions   • Penicillins Hives   • Strawberry C [Ascorbate] Hives       Past Surgical History:   Procedure Laterality Date   • ABDOMINAL SURGERY  03/01/2016    Abdominal portion of an Walter Michael esophagogastrectomy with jejunostomy tube placement. Esophageal carcinoma.   • COLONOSCOPY N/A 12/18/2017    Procedure: COLONOSCOPY;  Surgeon: Babar Connell MD;  Location: [facility] ENDOSCOPY;  Service:    • ENDOSCOPY N/A 2/3/2017    Procedure: ESOPHAGOGASTRODUODENOSCOPY;  Surgeon: Babar Connell MD;  Location: [facility] ENDOSCOPY;  Service:    • ENDOSCOPY N/A 5/22/2017    Procedure: ESOPHAGOGASTRODUODENOSCOPY;  Surgeon: Babar Connell MD;  Location: [facility] ENDOSCOPY;  Service:    • ENDOSCOPY N/A 9/22/2017    Procedure: ESOPHAGOGASTRODUODENOSCOPY;  Surgeon: Babar Connell MD;  Location: [facility] ENDOSCOPY;  Service:    • ENDOSCOPY N/A 12/18/2017    Procedure: ESOPHAGOGASTRODUODENOSCOPY;  Surgeon: Babar Connell MD;  Location: [facility] ENDOSCOPY;  Service:    • ESOPHAGUS SURGERY      Thoracic esophagectomy (Leadore Michael type).Thoracic lymphadenectomy.Fiberoptic bronchoscopy.Placement of On Q pain pump.Jejunostomy tube placement.Pyloromyotomy. 03/01/2016      • LUMBAR DISC SURGERY     • UPPER GASTROINTESTINAL ENDOSCOPY  02/03/2017   • UPPER GASTROINTESTINAL ENDOSCOPY  05/22/2017   • UPPER GASTROINTESTINAL ENDOSCOPY  09/22/2017        Family History   Problem Relation Age of Onset   • Ovarian cancer Mother    • Cancer Mother    • Osteoporosis Other    • Heart disease Other    • Diabetes Other    • Cancer Other      Colorectal-Parent   • Asthma Other    • Asthma Sister    • Diabetes Sister    • Hypertension Sister    • Alcohol abuse Brother    • Cancer Maternal Uncle        Social History     Social History   • Marital status: Single     Social History Main Topics   • Smoking status: Former Smoker     Quit date: 2015   • Smokeless tobacco: Never Used   • Alcohol use No   • Drug use: No   • Sexual activity: Defer     Other Topics Concern   • Not on file           Objective   Physical Exam   Constitutional: She is oriented to person, place, and time. Vital signs are normal. She appears well-developed and well-nourished.   HENT:   Head: Normocephalic.   Nose: Nose normal.   Eyes: Conjunctivae are normal. Pupils are equal, round, and reactive to light.   Neck: Normal range of motion.   Cardiovascular: Normal rate, regular rhythm and normal heart sounds.    Pulmonary/Chest: Effort normal and breath sounds normal.   Abdominal: Soft.   Musculoskeletal: Normal range of motion.   Neurological: She is alert and oriented to person, place, and time. GCS eye subscore is 4. GCS verbal subscore is 5. GCS motor subscore is 6.   Skin: Skin is warm and dry.        Psychiatric: She has a normal mood and affect.   Nursing note and vitals reviewed.      Procedures           ED Course  ED Course                  MDM  Number of Diagnoses or Management Options  Animal bite: new and requires workup  Risk of Complications, Morbidity, and/or Mortality  Presenting problems: moderate  Diagnostic procedures: low  Management options: moderate  General comments: Patient instructed to follow up with local health department     Patient Progress  Patient progress: stable        Final diagnoses:   Animal bite            Simón Clarke, APRN  05/12/18 1940

## 2018-05-30 RX ORDER — BUPROPION HYDROCHLORIDE 150 MG/1
150 TABLET, EXTENDED RELEASE ORAL 2 TIMES DAILY
COMMUNITY
End: 2018-12-18

## 2018-06-06 ENCOUNTER — APPOINTMENT (OUTPATIENT)
Dept: ONCOLOGY | Facility: HOSPITAL | Age: 58
End: 2018-06-06

## 2018-06-14 ENCOUNTER — ANESTHESIA (OUTPATIENT)
Dept: GASTROENTEROLOGY | Facility: HOSPITAL | Age: 58
End: 2018-06-14

## 2018-06-14 ENCOUNTER — HOSPITAL ENCOUNTER (OUTPATIENT)
Facility: HOSPITAL | Age: 58
Setting detail: HOSPITAL OUTPATIENT SURGERY
Discharge: HOME | End: 2018-06-14
Attending: INTERNAL MEDICINE | Admitting: INTERNAL MEDICINE

## 2018-06-14 ENCOUNTER — ANESTHESIA EVENT (OUTPATIENT)
Dept: GASTROENTEROLOGY | Facility: HOSPITAL | Age: 58
End: 2018-06-14

## 2018-06-14 VITALS
HEIGHT: 63 IN | SYSTOLIC BLOOD PRESSURE: 130 MMHG | RESPIRATION RATE: 20 BRPM | TEMPERATURE: 97.5 F | DIASTOLIC BLOOD PRESSURE: 77 MMHG | HEART RATE: 76 BPM | OXYGEN SATURATION: 97 % | WEIGHT: 118.17 LBS | BODY MASS INDEX: 20.94 KG/M2

## 2018-06-14 DIAGNOSIS — Z85.01 PERSONAL HISTORY OF ESOPHAGEAL CANCER: ICD-10-CM

## 2018-06-14 DIAGNOSIS — K22.2 ESOPHAGEAL STRICTURE: ICD-10-CM

## 2018-06-14 DIAGNOSIS — R13.19 ESOPHAGEAL DYSPHAGIA: ICD-10-CM

## 2018-06-14 PROCEDURE — 25010000002 PROPOFOL 10 MG/ML EMULSION: Performed by: NURSE ANESTHETIST, CERTIFIED REGISTERED

## 2018-06-14 PROCEDURE — 43248 EGD GUIDE WIRE INSERTION: CPT | Performed by: INTERNAL MEDICINE

## 2018-06-14 RX ORDER — LIDOCAINE HYDROCHLORIDE 10 MG/ML
INJECTION, SOLUTION INFILTRATION; PERINEURAL AS NEEDED
Status: DISCONTINUED | OUTPATIENT
Start: 2018-06-14 | End: 2018-06-14 | Stop reason: SURG

## 2018-06-14 RX ORDER — PROPOFOL 10 MG/ML
VIAL (ML) INTRAVENOUS AS NEEDED
Status: DISCONTINUED | OUTPATIENT
Start: 2018-06-14 | End: 2018-06-14 | Stop reason: SURG

## 2018-06-14 RX ORDER — DEXTROSE AND SODIUM CHLORIDE 5; .45 G/100ML; G/100ML
30 INJECTION, SOLUTION INTRAVENOUS CONTINUOUS PRN
Status: DISCONTINUED | OUTPATIENT
Start: 2018-06-14 | End: 2018-06-14 | Stop reason: HOSPADM

## 2018-06-14 RX ADMIN — LIDOCAINE HYDROCHLORIDE 80 MG: 10 INJECTION, SOLUTION INFILTRATION; PERINEURAL at 13:01

## 2018-06-14 RX ADMIN — PROPOFOL 30 MG: 10 INJECTION, EMULSION INTRAVENOUS at 13:09

## 2018-06-14 RX ADMIN — DEXTROSE AND SODIUM CHLORIDE 30 ML/HR: 5; 450 INJECTION, SOLUTION INTRAVENOUS at 12:31

## 2018-06-14 RX ADMIN — PROPOFOL 100 MG: 10 INJECTION, EMULSION INTRAVENOUS at 13:01

## 2018-06-14 RX ADMIN — PROPOFOL 30 MG: 10 INJECTION, EMULSION INTRAVENOUS at 13:04

## 2018-06-14 RX ADMIN — PROPOFOL 30 MG: 10 INJECTION, EMULSION INTRAVENOUS at 13:07

## 2018-06-14 NOTE — ANESTHESIA POSTPROCEDURE EVALUATION
Patient: Maddison Sheridan    Procedure Summary     Date:  06/14/18 Room / Location:  [facility] ENDOSCOPY 1 / [facility] ENDOSCOPY    Anesthesia Start:  1258 Anesthesia Stop:  1311    Procedure:  ESOPHAGOGASTRODUODENOSCOPY possible dilation (N/A Esophagus) Diagnosis:       Esophageal dysphagia      Esophageal stricture      Personal history of esophageal cancer      (Esophageal dysphagia [R13.10])      (Esophageal stricture [K22.2])      (Personal history of esophageal cancer [Z85.01])    Surgeon:  Babar Connell MD Provider:  Deandre Amador CRNA    Anesthesia Type:  MAC ASA Status:  3          Anesthesia Type: MAC  Last vitals  BP   140/87 (06/14/18 1225)   Temp   96 °F (35.6 °C) (06/14/18 1225)   Pulse   80 (06/14/18 1225)   Resp   18 (06/14/18 1225)     SpO2   97 % (06/14/18 1225)     Post Anesthesia Care and Evaluation    Patient location during evaluation: bedside  Patient participation: waiting for patient participation  Level of consciousness: responsive to physical stimuli  Pain management: adequate  Airway patency: patent  Anesthetic complications: No anesthetic complications  PONV Status: none  Cardiovascular status: acceptable  Respiratory status: acceptable  Hydration status: acceptable

## 2018-06-14 NOTE — ANESTHESIA PREPROCEDURE EVALUATION
Anesthesia Evaluation     NPO Solid Status: > 8 hours  NPO Liquid Status: > 8 hours           Airway   Mallampati: II  TM distance: >3 FB  Neck ROM: full  no difficulty expected  Dental - normal exam     Pulmonary - normal exam   (+) COPD, shortness of breath,   Cardiovascular - normal exam    (+) hypertension,       Neuro/Psych  (+) psychiatric history,     GI/Hepatic/Renal/Endo    (+)  GERD,      Musculoskeletal     (+) back pain,   Abdominal    Substance History      OB/GYN          Other      history of cancer                    Anesthesia Plan    ASA 3     MAC     intravenous induction   Anesthetic plan and risks discussed with patient.

## 2018-06-14 NOTE — H&P
Progress Notes  Encounter Date: 6/14/2018  [facility]  Gastroenterology   Expand All Collapse All    []Manual[]Template  []Copied  Chief Complaint   Patient presents with   • Colonoscopy       results   • EGD       results   • Difficulty Swallowing            Subjective       Maddison Usama Sheridan is a 58 y.o. female. she is here today for follow-up.  58-year-old female presents to discuss EGD and colonoscopy results.  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  She follows up with oncology routinely.  EGD noted a benign-appearing stenosis dilated to 54 Italian.  Partial esophagectomy anastomosis with some mid esophagus.  Entire examined stomach and duodenum was normal.  No specimens were collected.  Colonoscopy had a poor prep and noted many small and large mouth diverticula throughout the colon.  Hemorrhoids were seen.  Patient states she was confused about how to drink the prep and drink it at the wrong time of day.     Difficulty Swallowing   This is a chronic problem. The current episode started more than 1 year ago. The problem occurs daily. The problem has been waxing and waning. Associated symptoms include abdominal pain (mild ) and vomiting (sometimes when food hanges ). Pertinent negatives include no arthralgias, chills, diaphoresis, fatigue, fever, nausea or sore throat. The symptoms are aggravated by swallowing. Treatments tried: Dilated about 6x takes pepcid daily.  The treatment provided mild relief.      Plan; schedule patient for EGD due to dysphagia likely recurrent esophageal stricture due to history of radiation and partial esophagectomy.  She will need repeat colonoscopy December 2018 due to poor prep.  Recommend daily fiber supplementation for diverticular disease.        The following portions of  the patient's history were reviewed and updated as appropriate:   Medical History        Past Medical History:   Diagnosis Date   • Abnormal weight loss     • Anxiety     • Atrophic vaginitis     • Depression     • Dysphagia       pharyngoesophageal phase      • Encounter for surgical aftercare following surgery of digestive system       Charlotte Michael (thoracic esophagectomy, lymphadenectomly, Bronchoscopy, Placement on Q. 3/1/16      • History of esophagogastroduodenoscopy       Normal hypopharynx.A tumor was found in the middle third of the esophagus.Mul.biopsies taken.Normal GE junction.Mild nonerosive gastritis in antrum.Biopsies taken.Normal pylorus and duodenum. 09/24/2015       • History of hysteroscopy       Hysteroscopy, dilation and curettage, and repair of vaginal laceration. 08/03/2013       • History of substance abuse     • Hypertension     • Lumbosacral strain     • Myopia     • Presbyopia     • Primary malignant neoplasm of thoracic part of esophagus       Squamous cell   • Tobacco dependence       1 ppd         Surgical History         Past Surgical History:   Procedure Laterality Date   • ABDOMINAL SURGERY   03/01/2016     Abdominal portion of an Charlotte Michael esophagogastrectomy with jejunostomy tube placement. Esophageal carcinoma.   • COLONOSCOPY N/A 12/18/2017     Procedure: COLONOSCOPY;  Surgeon: Babar Connell MD;  Location: [facility] ENDOSCOPY;  Service:    • ENDOSCOPY N/A 2/3/2017     Procedure: ESOPHAGOGASTRODUODENOSCOPY;  Surgeon: Babar Connell MD;  Location: [facility] ENDOSCOPY;  Service:    • ENDOSCOPY N/A 5/22/2017     Procedure: ESOPHAGOGASTRODUODENOSCOPY;  Surgeon: Babar Connell MD;  Location: [facility] ENDOSCOPY;  Service:    • ENDOSCOPY N/A 9/22/2017     Procedure: ESOPHAGOGASTRODUODENOSCOPY;  Surgeon: Babar Connell MD;  Location: [facility] ENDOSCOPY;  Service:    • ENDOSCOPY N/A 12/18/2017     Procedure: ESOPHAGOGASTRODUODENOSCOPY;  Surgeon: Babar Connell MD;  Location: [facility] ENDOSCOPY;   Service:    • ESOPHAGUS SURGERY         Thoracic esophagectomy ([place] Michael type).Thoracic lymphadenectomy.Fiberoptic bronchoscopy.Placement of On Q pain pump.Jejunostomy tube placement.Pyloromyotomy. 03/01/2016      • LUMBAR DISC SURGERY       • UPPER GASTROINTESTINAL ENDOSCOPY   02/03/2017   • UPPER GASTROINTESTINAL ENDOSCOPY   05/22/2017   • UPPER GASTROINTESTINAL ENDOSCOPY   09/22/2017                Family History   Problem Relation Age of Onset   • Ovarian cancer Mother     • Cancer Mother     • Osteoporosis Other     • Heart disease Other     • Diabetes Other     • Cancer Other         Colorectal-Parent   • Asthma Other     • Asthma Sister     • Diabetes Sister     • Hypertension Sister     • Alcohol abuse Brother     • Cancer Maternal Uncle            OB History      No data available          Current Medications          Current Outpatient Prescriptions   Medication Sig Dispense Refill   • albuterol (PROVENTIL HFA;VENTOLIN HFA) 108 (90 BASE) MCG/ACT inhaler Inhale 2 puffs Every 4 (Four) Hours As Needed for wheezing. 18 g 11   • amLODIPine (NORVASC) 10 MG tablet Daily.       • buPROPion SR (WELLBUTRIN SR) 150 MG 12 hr tablet TAKE 1 TABLET BY MOUTH 2 TIMES A DAY. 60 tablet 5   • dicyclomine (BENTYL) 20 MG tablet Take 20 mg by mouth Every 6 (Six) Hours.       • famotidine (PEPCID) 20 MG tablet         • hydrochlorothiazide (HYDRODIURIL) 12.5 MG tablet Take 12.5 mg by mouth Daily.   11   • HYDROcodone-acetaminophen (NORCO)  MG per tablet TK 1 T PO TID PRN.   0   • hydrOXYzine (VISTARIL) 25 MG capsule Take 25 mg by mouth every night at bedtime.   11   • loperamide (IMODIUM A-D) 2 MG tablet Take 2 mg by mouth 3 (Three) Times a Day As Needed for Diarrhea.       • mirtazapine (REMERON) 15 MG tablet 15 mg Every Night.       • NEXIUM 24HR 20 MG capsule Take 2 capsules by mouth Daily. (Patient taking differently: Take 40 mg by mouth As Needed.) 60 capsule 5   • ondansetron ODT (ZOFRAN-ODT) 4 MG disintegrating  "tablet Take 1 tablet by mouth Every 6 (Six) Hours As Needed for nausea or vomiting. (Patient taking differently: Take 4 mg by mouth As Needed for Nausea or Vomiting.) 60 tablet 1   • potassium chloride (K-DUR,KLOR-CON) 20 MEQ CR tablet         • potassium citrate (UROCIT-K) 5 MEQ (540 MG) CR tablet Take  by mouth Daily.          No current facility-administered medications for this visit.               Allergies   Allergen Reactions   • Penicillins Hives   • Strawberry C [Ascorbate] Hives      Social History   Social History           Social History   • Marital status: Single            Social History Main Topics   • Smoking status: Former Smoker       Quit date: 2015   • Smokeless tobacco: Never Used   • Alcohol use No   • Drug use: No   • Sexual activity: Defer           Other Topics Concern   • Not on file            Review of Systems  Review of Systems   Constitutional: Negative for activity change, appetite change, chills, diaphoresis, fatigue, fever and unexpected weight change.   HENT: Positive for trouble swallowing. Negative for sore throat.    Respiratory: Positive for choking. Negative for shortness of breath.    Gastrointestinal: Positive for abdominal pain (mild ) and vomiting (sometimes when food hanges ). Negative for abdominal distention, anal bleeding, blood in stool, constipation, diarrhea, nausea and rectal pain.   Musculoskeletal: Negative for arthralgias.   Skin: Negative for pallor.   Neurological: Negative for light-headedness.                    /82   Pulse 82   Ht 160 cm (62.99\")   Wt 54.6 kg (120 lb 6.4 oz)   LMP 03/17/1989 (Within Months) Comment: Postmenopausal  BMI 21.33 kg/m²         Objective       Physical Exam   Constitutional: She is oriented to person, place, and time. She appears well-developed and well-nourished. She is cooperative. No distress.   HENT:   Head: Normocephalic and atraumatic.   Neck: Normal range of motion. Neck supple. No thyromegaly present. "   Cardiovascular: Normal rate, regular rhythm and normal heart sounds.    Pulmonary/Chest: Effort normal and breath sounds normal. She has no wheezes. She has no rhonchi. She has no rales.   Abdominal: Soft. Normal appearance and bowel sounds are normal. She exhibits no distension. There is no hepatosplenomegaly. There is tenderness in the right upper quadrant, epigastric area and left upper quadrant. There is no rigidity and no guarding. No hernia.   Lymphadenopathy:     She has no cervical adenopathy.   Neurological: She is alert and oriented to person, place, and time.   Skin: Skin is warm, dry and intact. No rash noted. No pallor.   Psychiatric: She has a normal mood and affect. Her speech is normal.              Lab on 12/06/2017   Component Date Value Ref Range Status   • Glucose 12/06/2017 104* 60 - 100 mg/dL Final   • BUN 12/06/2017 15  7 - 21 mg/dL Final   • Creatinine 12/06/2017 1.04* 0.50 - 1.00 mg/dL Final   • Sodium 12/06/2017 141  137 - 145 mmol/L Final   • Potassium 12/06/2017 3.6  3.5 - 5.1 mmol/L Final   • Chloride 12/06/2017 98  95 - 110 mmol/L Final   • CO2 12/06/2017 32.0* 22.0 - 31.0 mmol/L Final   • Calcium 12/06/2017 9.3  8.4 - 10.2 mg/dL Final   • Total Protein 12/06/2017 7.3  6.3 - 8.6 g/dL Final   • Albumin 12/06/2017 4.20  3.40 - 4.80 g/dL Final   • ALT (SGPT) 12/06/2017 27  9 - 52 U/L Final   • AST (SGOT) 12/06/2017 45* 14 - 36 U/L Final   • Alkaline Phosphatase 12/06/2017 77  38 - 126 U/L Final   • Total Bilirubin 12/06/2017 0.9  0.2 - 1.3 mg/dL Final   • eGFR   Amer 12/06/2017 66  51 - 120 mL/min/1.73 Final   • Globulin 12/06/2017 3.1  2.3 - 3.5 gm/dL Final   • A/G Ratio 12/06/2017 1.4  1.1 - 1.8 g/dL Final   • BUN/Creatinine Ratio 12/06/2017 14.4  7.0 - 25.0 Final   • Anion Gap 12/06/2017 11.0  5.0 - 15.0 mmol/L Final   • WBC 12/06/2017 3.81  3.20 - 9.80 10*3/mm3 Final   • RBC 12/06/2017 4.23  3.77 - 5.16 10*6/mm3 Final   • Hemoglobin 12/06/2017 13.3  12.0 - 15.5 g/dL Final   •  Hematocrit 12/06/2017 39.8  35.0 - 45.0 % Final   • MCV 12/06/2017 94.1  80.0 - 98.0 fL Final   • MCH 12/06/2017 31.4  26.5 - 34.0 pg Final   • MCHC 12/06/2017 33.4  31.4 - 36.0 g/dL Final   • RDW 12/06/2017 12.9  11.5 - 14.5 % Final   • RDW-SD 12/06/2017 44.0  36.4 - 46.3 fl Final   • MPV 12/06/2017 11.2  8.0 - 12.0 fL Final   • Platelets 12/06/2017 212  150 - 450 10*3/mm3 Final   • Neutrophil % 12/06/2017 56.4  37.0 - 80.0 % Final   • Lymphocyte % 12/06/2017 28.1  10.0 - 50.0 % Final   • Monocyte % 12/06/2017 13.1* 0.0 - 12.0 % Final   • Eosinophil % 12/06/2017 1.6  0.0 - 7.0 % Final   • Basophil % 12/06/2017 0.5  0.0 - 2.0 % Final   • Immature Grans % 12/06/2017 0.3  0.0 - 0.5 % Final   • Neutrophils, Absolute 12/06/2017 2.15  2.00 - 8.60 10*3/mm3 Final   • Lymphocytes, Absolute 12/06/2017 1.07  0.60 - 4.20 10*3/mm3 Final   • Monocytes, Absolute 12/06/2017 0.50  0.00 - 0.90 10*3/mm3 Final   • Eosinophils, Absolute 12/06/2017 0.06  0.00 - 0.70 10*3/mm3 Final   • Basophils, Absolute 12/06/2017 0.02  0.00 - 0.20 10*3/mm3 Final   • Immature Grans, Absolute 12/06/2017 0.01  0.00 - 0.02 10*3/mm3 Final         Assessment/Plan          1. Esophageal dysphagia    2. Esophageal stricture    3. Personal history of esophageal cancer    .         Orders placed during this encounter include:     ESOPHAGOGASTRODUODENOSCOPY possible dilation (N/A)     Review and/or summary of lab tests, radiology, procedures, medications. Review and summary of old records and obtaining of history. The risks and benefits of my recommendations, as well as other treatment options were discussed with the patient today. Questions were answered.     No orders of the defined types were placed in this encounter.           This document has been electronically signed by Babar Connell MD on June 14, 2018 12:25 PM                  Results for orders placed or performed in visit on 12/06/17   CBC Auto Differential   Result Value Ref Range     WBC 3.81  3.20 - 9.80 10*3/mm3     RBC 4.23 3.77 - 5.16 10*6/mm3     Hemoglobin 13.3 12.0 - 15.5 g/dL     Hematocrit 39.8 35.0 - 45.0 %     MCV 94.1 80.0 - 98.0 fL     MCH 31.4 26.5 - 34.0 pg     MCHC 33.4 31.4 - 36.0 g/dL     RDW 12.9 11.5 - 14.5 %     RDW-SD 44.0 36.4 - 46.3 fl     MPV 11.2 8.0 - 12.0 fL     Platelets 212 150 - 450 10*3/mm3     Neutrophil % 56.4 37.0 - 80.0 %     Lymphocyte % 28.1 10.0 - 50.0 %     Monocyte % 13.1 (H) 0.0 - 12.0 %     Eosinophil % 1.6 0.0 - 7.0 %     Basophil % 0.5 0.0 - 2.0 %     Immature Grans % 0.3 0.0 - 0.5 %     Neutrophils, Absolute 2.15 2.00 - 8.60 10*3/mm3     Lymphocytes, Absolute 1.07 0.60 - 4.20 10*3/mm3     Monocytes, Absolute 0.50 0.00 - 0.90 10*3/mm3     Eosinophils, Absolute 0.06 0.00 - 0.70 10*3/mm3     Basophils, Absolute 0.02 0.00 - 0.20 10*3/mm3     Immature Grans, Absolute 0.01 0.00 - 0.02 10*3/mm3   Comprehensive Metabolic Panel   Result Value Ref Range     Glucose 104 (H) 60 - 100 mg/dL     BUN 15 7 - 21 mg/dL     Creatinine 1.04 (H) 0.50 - 1.00 mg/dL     Sodium 141 137 - 145 mmol/L     Potassium 3.6 3.5 - 5.1 mmol/L     Chloride 98 95 - 110 mmol/L     CO2 32.0 (H) 22.0 - 31.0 mmol/L     Calcium 9.3 8.4 - 10.2 mg/dL     Total Protein 7.3 6.3 - 8.6 g/dL     Albumin 4.20 3.40 - 4.80 g/dL     ALT (SGPT) 27 9 - 52 U/L     AST (SGOT) 45 (H) 14 - 36 U/L     Alkaline Phosphatase 77 38 - 126 U/L     Total Bilirubin 0.9 0.2 - 1.3 mg/dL     eGFR   Amer 66 51 - 120 mL/min/1.73     Globulin 3.1 2.3 - 3.5 gm/dL     A/G Ratio 1.4 1.1 - 1.8 g/dL     BUN/Creatinine Ratio 14.4 7.0 - 25.0     Anion Gap 11.0 5.0 - 15.0 mmol/L   Results for orders placed or performed in visit on 05/19/17   Specimen Status Report   Result Value Ref Range     Specimen Status Comment     Pain Management Profile (13 Drugs) Urine   Result Value Ref Range     Amphetamine, Urine Qual Negative Caqcyx=5604 ng/mL     Barbiturates Screen, Urine Negative Anzcuc=157 ng/mL     Benzodiazepine Screen, Urine  Negative Llespa=597 ng/mL     THC Screen, Urine Negative Cutoff=20 ng/mL     Cocaine Screen, Urine Positive (A) Qollyl=762     Opiate Screen, Urine Negative Tnrcfq=713 ng/mL     Oxycodone/Oxymorphone, Urine Negative Xkzkrz=280 ng/mL     Phencyclidine (PCP), Urine Negative Cutoff=25 ng/mL     Methadone Screen, Urine Negative Jrqzts=595 ng/mL     Propoxyphene Screen Negative Eddcfu=290 ng/mL     Meperidine, Urine Negative Pdkldd=314 ng/mL     Fentanyl, Urine Negative Hbowyc=4173 pg/mL     Tramadol Screen, Urine Negative Emxumj=618 ng/mL     Creatinine, Urine 135.9 20.0 - 300.0 mg/dL     Specific Gravity, UA 1.026       pH, UA 8.8 4.5 - 8.9     Please note Comment     Opiates Confirmation, Urine   Result Value Ref Range     Opiates Positive (A) ng/mL     Codeine, Meconium Negative Sssiiu=553     Morphine, Urine Negative Kduycr=178     Hydromorphone Negative Centoz=379     Hydrocodone UR Positive (A)       Hydrocodone Confirm 497 Taared=859 ng/mL     Please note Comment     Results for orders placed or performed in visit on 04/20/17   CBC Auto Differential   Result Value Ref Range     WBC 5.37 3.20 - 9.80 10*3/mm3     RBC 3.85 3.77 - 5.16 10*6/mm3     Hemoglobin 12.6 12.0 - 15.5 g/dL     Hematocrit 36.4 35.0 - 45.0 %     MCV 94.5 80.0 - 98.0 fL     MCH 32.7 26.5 - 34.0 pg     MCHC 34.6 31.4 - 36.0 g/dL     RDW 13.8 11.5 - 14.5 %     RDW-SD 47.6 (H) 36.4 - 46.3 fl     MPV 10.8 8.0 - 12.0 fL     Platelets 171 150 - 450 10*3/mm3     Neutrophil % 71.9 37.0 - 80.0 %     Lymphocyte % 16.8 10.0 - 50.0 %     Monocyte % 10.2 0.0 - 12.0 %     Eosinophil % 0.9 0.0 - 7.0 %     Basophil % 0.2 0.0 - 2.0 %     Immature Grans % 0.0 0.0 - 0.5 %     Neutrophils, Absolute 3.86 2.00 - 8.60 10*3/mm3     Lymphocytes, Absolute 0.90 0.60 - 4.20 10*3/mm3     Monocytes, Absolute 0.55 0.00 - 0.90 10*3/mm3     Eosinophils, Absolute 0.05 0.00 - 0.70 10*3/mm3     Basophils, Absolute 0.01 0.00 - 0.20 10*3/mm3     Immature Grans, Absolute 0.00 0.00 -  0.02 10*3/mm3   Comprehensive Metabolic Panel   Result Value Ref Range     Glucose 102 (H) 60 - 100 mg/dL     BUN 14 7 - 21 mg/dL     Creatinine 0.73 0.50 - 1.00 mg/dL     Sodium 139 137 - 145 mmol/L     Potassium 3.5 3.5 - 5.1 mmol/L     Chloride 101 95 - 110 mmol/L     CO2 28.0 22.0 - 31.0 mmol/L     Calcium 8.6 8.4 - 10.2 mg/dL     Total Protein 6.9 6.3 - 8.6 g/dL     Albumin 3.80 3.40 - 4.80 g/dL     ALT (SGPT) 20 9 - 52 U/L     AST (SGOT) 18 14 - 36 U/L     Alkaline Phosphatase 73 38 - 126 U/L     Total Bilirubin 1.0 0.2 - 1.3 mg/dL     eGFR   Amer 99 51 - 120 mL/min/1.73     Globulin 3.1 2.3 - 3.5 gm/dL     A/G Ratio 1.2 1.1 - 1.8 g/dL     BUN/Creatinine Ratio 19.2 7.0 - 25.0     Anion Gap 10.0 5.0 - 15.0 mmol/L      *Note: Due to a large number of results and/or encounters for the requested time period, some results have not been displayed. A complete set of results can be found in Results Review.              Office Visit on 5/10/2018            Detailed Report

## 2018-08-02 ENCOUNTER — TELEPHONE (OUTPATIENT)
Dept: GASTROENTEROLOGY | Facility: CLINIC | Age: 58
End: 2018-08-02

## 2018-08-02 ENCOUNTER — OFFICE VISIT (OUTPATIENT)
Dept: GASTROENTEROLOGY | Facility: CLINIC | Age: 58
End: 2018-08-02

## 2018-08-02 VITALS
BODY MASS INDEX: 21.23 KG/M2 | DIASTOLIC BLOOD PRESSURE: 70 MMHG | SYSTOLIC BLOOD PRESSURE: 120 MMHG | HEIGHT: 63 IN | WEIGHT: 119.8 LBS | HEART RATE: 58 BPM

## 2018-08-02 DIAGNOSIS — R13.19 OTHER DYSPHAGIA: Primary | ICD-10-CM

## 2018-08-02 PROCEDURE — 99214 OFFICE O/P EST MOD 30 MIN: CPT | Performed by: INTERNAL MEDICINE

## 2018-08-02 RX ORDER — PANTOPRAZOLE SODIUM 40 MG/1
40 TABLET, DELAYED RELEASE ORAL DAILY
COMMUNITY
End: 2018-12-18

## 2018-08-02 RX ORDER — SUCRALFATE 1 G/1
1 TABLET ORAL 4 TIMES DAILY
Qty: 120 TABLET | Refills: 5 | Status: SHIPPED | OUTPATIENT
Start: 2018-08-02 | End: 2019-08-22 | Stop reason: SDUPTHER

## 2018-08-02 RX ORDER — SUCRALFATE ORAL 1 G/10ML
1 SUSPENSION ORAL 4 TIMES DAILY
Qty: 4 G | Refills: 5 | Status: SHIPPED | OUTPATIENT
Start: 2018-08-02 | End: 2018-08-02 | Stop reason: ALTCHOICE

## 2018-08-02 RX ORDER — SUCRALFATE 1 G/1
1 TABLET ORAL 4 TIMES DAILY
Qty: 120 TABLET | Refills: 5 | Status: SHIPPED | OUTPATIENT
Start: 2018-08-02 | End: 2018-08-02 | Stop reason: ALTCHOICE

## 2018-08-02 NOTE — TELEPHONE ENCOUNTER
----- Message from Ni Hill sent at 8/2/2018  1:36 PM CDT -----  Contact: [phone]  Patient left message and states that the calfrate that dr. Connell called in is not covered by her insurance. She wanting to know if there is something different he can call in.

## 2018-08-02 NOTE — PROGRESS NOTES
[facility]      Chief Complaint:   Chief Complaint   Patient presents with   • Difficulty Swallowing       Subjective     HPI:   Patient with dysphagia.  Patient recently had this dilated but complains of continued reflux.  Discussed patient taking a full glass of water with his medications.  We'll also place patient on Carafate 1 g 4 times a day.    Plans and: A patient follow-up in 3 months.  Blood patient have dilatation as needed.           Past Medical History:   Past Medical History:   Diagnosis Date   • Abnormal weight loss    • Anxiety    • Atrophic vaginitis    • Depression    • Dysphagia     pharyngoesophageal phase      • Encounter for surgical aftercare following surgery of digestive system     [place] Michael (thoracic esophagectomy, lymphadenectomly, Bronchoscopy, Placement on Q. 3/1/16      • History of esophagogastroduodenoscopy     Normal hypopharynx.A tumor was found in the middle third of the esophagus.Mul.biopsies taken.Normal GE junction.Mild nonerosive gastritis in antrum.Biopsies taken.Normal pylorus and duodenum. 09/24/2015       • History of hysteroscopy     Hysteroscopy, dilation and curettage, and repair of vaginal laceration. 08/03/2013       • History of substance abuse    • Hypertension    • Lumbosacral strain    • Myopia    • Presbyopia    • Primary malignant neoplasm of thoracic part of esophagus (CMS/HCC)     Squamous cell   • Tobacco dependence     1 ppd       Family History:  Family History   Problem Relation Age of Onset   • Ovarian cancer Mother    • Cancer Mother    • Osteoporosis Other    • Heart disease Other    • Diabetes Other    • Cancer Other         Colorectal-Parent   • Asthma Other    • Asthma Sister    • Diabetes Sister    • Hypertension Sister    • Alcohol abuse Brother    • Cancer Maternal Uncle        Social History:   reports that she quit smoking about 3 years ago. She has never used smokeless tobacco. She reports that she does not drink  alcohol or use drugs.    Medications:   Prior to Admission medications    Medication Sig Start Date End Date Taking? Authorizing Provider   albuterol (PROVENTIL HFA;VENTOLIN HFA) 108 (90 BASE) MCG/ACT inhaler Inhale 2 puffs Every 4 (Four) Hours As Needed for wheezing. 2/17/17  Yes Tiffayn Jimenez MD   amLODIPine (NORVASC) 10 MG tablet Take 10 mg by mouth Daily. 11/13/17  Yes Zoey Cruz MD   buPROPion SR (WELLBUTRIN SR) 150 MG 12 hr tablet Take 150 mg by mouth 2 (Two) Times a Day.   Yes Zoey Cruz MD   dicyclomine (BENTYL) 20 MG tablet Take 20 mg by mouth Every 6 (Six) Hours.   Yes Zoey rCuz MD   famotidine (PEPCID) 20 MG tablet Take 20 mg by mouth Daily. 11/13/17  Yes Zoey Cruz MD   hydrochlorothiazide (HYDRODIURIL) 12.5 MG tablet Take 12.5 mg by mouth Daily. 3/16/18  Yes Zoey Cruz MD   HYDROcodone-acetaminophen (NORCO)  MG per tablet TK 1 T PO TID PRN. 5/7/18  Yes Zoey Cruz MD   hydrOXYzine (VISTARIL) 25 MG capsule Take 25 mg by mouth every night at bedtime. 3/14/18  Yes Zoey Cruz MD   mirtazapine (REMERON) 15 MG tablet 30 mg Every Night. 11/16/17  Yes Zoey Cruz MD   pantoprazole (PROTONIX) 40 MG EC tablet Take 40 mg by mouth Daily.   Yes Zoey Cruz MD   potassium chloride (K-DUR,KLOR-CON) 20 MEQ CR tablet Take 20 mEq by mouth Daily. 11/13/17  Yes Zoey Cruz MD   sucralfate (CARAFATE) 1 GM/10ML suspension Take 10 mL by mouth 4 (Four) Times a Day. 8/2/18   Babar Connell MD   loperamide (IMODIUM A-D) 2 MG tablet Take 2 mg by mouth 3 (Three) Times a Day As Needed for Diarrhea.  8/2/18  Zoey Cruz MD   NEXIUM 24HR 20 MG capsule Take 2 capsules by mouth Daily.  Patient taking differently: Take 40 mg by mouth As Needed. 4/20/17 8/2/18  Tiffany Jimenez MD   ondansetron ODT (ZOFRAN-ODT) 4 MG disintegrating tablet Take 1 tablet by mouth Every 6 (Six) Hours As Needed for nausea or  "vomiting.  Patient taking differently: Take 4 mg by mouth As Needed for Nausea or Vomiting. 12/23/16 8/2/18  Tiffany Jimenez MD       Allergies:  Penicillins and Strawberry c [ascorbate]    ROS:    Review of Systems   Constitutional: Negative for activity change, appetite change, chills, diaphoresis, fatigue, fever and unexpected weight change.   HENT: Positive for trouble swallowing. Negative for sore throat.    Respiratory: Negative for shortness of breath.    Gastrointestinal: Positive for abdominal pain. Negative for abdominal distention, anal bleeding, blood in stool, constipation, diarrhea, nausea, rectal pain and vomiting.   Musculoskeletal: Negative for arthralgias.   Skin: Negative for pallor.   Neurological: Negative for light-headedness.     Objective     Blood pressure 120/70, pulse 58, height 160 cm (63\"), weight 54.3 kg (119 lb 12.8 oz), last menstrual period 03/17/1989, not currently breastfeeding.    Physical Exam   Constitutional: She is oriented to person, place, and time. She appears well-developed and well-nourished. No distress.   HENT:   Head: Normocephalic and atraumatic.   Cardiovascular: Normal rate, regular rhythm, normal heart sounds and intact distal pulses.  Exam reveals no gallop and no friction rub.    No murmur heard.  Pulmonary/Chest: Breath sounds normal. No respiratory distress. She has no wheezes. She has no rales. She exhibits no tenderness.   Abdominal: Soft. Bowel sounds are normal. She exhibits no distension and no mass. There is no tenderness. There is no rebound and no guarding. No hernia.   Musculoskeletal: Normal range of motion. She exhibits no edema.   Neurological: She is alert and oriented to person, place, and time.   Skin: Skin is warm and dry. No rash noted. She is not diaphoretic. No erythema. No pallor.   Psychiatric: She has a normal mood and affect. Her behavior is normal. Judgment and thought content normal.        Assessment/Plan   Maddison was seen today for " difficulty swallowing.    Diagnoses and all orders for this visit:    Other dysphagia    Other orders  -     sucralfate (CARAFATE) 1 GM/10ML suspension; Take 10 mL by mouth 4 (Four) Times a Day.        * Surgery not found *     Diagnosis Plan   1. Other dysphagia         Anticipated Surgical Procedure:  No orders of the defined types were placed in this encounter.      The risks, benefits, and alternatives of this procedure have been discussed with the patient or the responsible party- the patient understands and agrees to proceed.

## 2018-08-02 NOTE — PATIENT INSTRUCTIONS
MyPlate from [employer]  The general, healthful diet is based on the 2010 Dietary Guidelines for Americans. The amount of food you need to eat from each food group depends on your age, sex, and level of physical activity and can be individualized by a dietitian. Go to ChooseMyPlate.gov for more information.  What do I need to know about the MyPlate plan?  · Enjoy your food, but eat less.  · Avoid oversized portions.  ? ½ of your plate should include fruits and vegetables.  ? ¼ of your plate should be grains.  ? ¼ of your plate should be protein.  Grains  · Make at least half of your grains whole grains.  · For a 2,000 calorie daily food plan, eat 6 oz every day.  · 1 oz is about 1 slice bread, 1 cup cereal, or ½ cup cooked rice, cereal, or pasta.  Vegetables  · Make half your plate fruits and vegetables.  · For a 2,000 calorie daily food plan, eat 2½ cups every day.  · 1 cup is about 1 cup raw or cooked vegetables or vegetable juice or 2 cups raw leafy greens.  Fruits  · Make half your plate fruits and vegetables.  · For a 2,000 calorie daily food plan, eat 2 cups every day.  · 1 cup is about 1 cup fruit or 100% fruit juice or ½ cup dried fruit.  Protein  · For a 2,000 calorie daily food plan, eat 5½ oz every day.  · 1 oz is about 1 oz meat, poultry, or fish, ¼ cup cooked beans, 1 egg, 1 Tbsp peanut butter, or ½ oz nuts or seeds.  Dairy  · Switch to fat-free or low-fat (1%) milk.  · For a 2,000 calorie daily food plan, eat 3 cups every day.  · 1 cup is about 1 cup milk or yogurt or soy milk (soy beverage), 1½ oz natural cheese, or 2 oz processed cheese.  Fats, Oils, and Empty Calories  · Only small amounts of oils are recommended.  · Empty calories are calories from solid fats or added sugars.  · Compare sodium in foods like soup, bread, and frozen meals. Choose the foods with lower numbers.  · Drink water instead of sugary drinks.  What foods can I eat?  Grains  Whole grains such as whole wheat, quinoa, millet, and  bulgur. Bread, rolls, and pasta made from whole grains. Brown or wild rice. Hot or cold cereals made from whole grains and without added sugar.  Vegetables  All fresh vegetables, especially fresh red, dark green, or orange vegetables. Peas and beans. Low-sodium frozen or canned vegetables prepared without added salt. Low-sodium vegetable juices.  Fruits  All fresh, frozen, and dried fruits. Canned fruit packed in water or fruit juice without added sugar. Fruit juices without added sugar.  Meats and Other Protein Sources  Boiled, baked, or grilled lean meat trimmed of fat. Skinless poultry. Fresh seafood and shellfish. Canned seafood packed in water. Unsalted nuts and unsalted nut butters. Tofu. Dried beans and pea. Eggs.  Dairy  Low-fat or fat-free milk, yogurt, and cheeses.  Sweets and Desserts  Frozen desserts made from low-fat milk.  Fats and Oils  Olive, peanut, and canola oils and margarine. Salad dressing and mayonnaise made from these oils.  Other  Soups and casseroles made from allowed ingredients and without added fat or salt.  The items listed above may not be a complete list of recommended foods or beverages. Contact your dietitian for more options.  What foods are not recommended?  Grains  Sweetened, low-fiber cereals. Packaged baked goods. Snack crackers and chips. Cheese crackers, butter crackers, and biscuits. Frozen waffles, sweet breads, doughnuts, pastries, packaged baking mixes, pancakes, cakes, and cookies.  Vegetables  Regular canned or frozen vegetables or vegetables prepared with salt. Canned tomatoes. Canned tomato sauce. Fried vegetables. Vegetables in cream sauce or cheese sauce.  Fruits  Fruits packed in syrup or made with added sugar.  Meats and Other Protein Sources  Marbled or fatty meats such as ribs. Poultry with skin. Fried meats, poultry, eggs, or fish. Sausages, hot dogs, and deli meats such as pastrami, bologna, or salami.  Dairy  Whole milk, cream, cheeses made from whole milk,  sour cream. Ice cream or yogurt made from whole milk or with added sugar.  Beverages  For adults, no more than one alcoholic drink per day. Regular soft drinks or other sugary beverages. Juice drinks.  Sweets and Desserts  Sugary or fatty desserts, candy, and other sweets.  Fats and Oils  Solid shortening or partially hydrogenated oils. Solid margarine. Margarine that contains trans fats. Butter.  The items listed above may not be a complete list of foods and beverages to avoid. Contact your dietitian for more information.  This information is not intended to replace advice given to you by your health care provider. Make sure you discuss any questions you have with your health care provider.  Document Released: 01/06/2009 Document Revised: 05/25/2017 Document Reviewed: 11/26/2014  [employer] Interactive Patient Education © 2018 [employer]  BMI for Adults  Body mass index (BMI) is a number that is calculated from a person's weight and height. In most adults, the number is used to find how much of an adult's weight is made up of fat. BMI is not as accurate as a direct measure of body fat.  How is BMI calculated?  BMI is calculated by dividing weight in kilograms by height in meters squared. It can also be calculated by dividing weight in pounds by height in inches squared, then multiplying the resulting number by 703. Charts are available to help you find your BMI quickly and easily without doing this calculation.  How is BMI interpreted?  Health care professionals use BMI charts to identify whether an adult is underweight, at a normal weight, or overweight based on the following guidelines:  · Underweight: BMI less than 18.5.  · Normal weight: BMI between 18.5 and 24.9.  · Overweight: BMI between 25 and 29.9.  · Obese: BMI of 30 and above.    BMI is usually interpreted the same for males and females.  Weight includes both fat and muscle, so someone with a muscular build, such as an athlete, may have a BMI that is  higher than 24.9. In cases like these, BMI may not accurately depict body fat. To determine if excess body fat is the cause of a BMI of 25 or higher, further assessments may need to be done by a health care provider.  Why is BMI a useful tool?  BMI is used to identify a possible weight problem that may be related to a medical problem or may increase the risk for medical problems. BMI can also be used to promote changes to reach a healthy weight.  This information is not intended to replace advice given to you by your health care provider. Make sure you discuss any questions you have with your health care provider.  Document Released: 08/29/2005 Document Revised: 04/27/2017 Document Reviewed: 05/15/2015  [employer] Interactive Patient Education © 2018 [employer]

## 2018-08-02 NOTE — TELEPHONE ENCOUNTER
08/02/2018, 1420 - Patient telephoned per this staff member [phone] with notification of verbal order received per Dr. Babar Hernandez M.D. regarding patient statement prescription medication written per Dr. Hernandez today, Thursday, August 2, 2018, Carafate 1 GM/10 ML suspension, 10 ML by mouth 4 times per day, not covered per patient's insurance.  Per Dr. Hernandez - Carafate 1 GM Tablets, 1 tablet by mouth 4 times per day, #120, 5 renewals - Patient may dissolve tablet in water.  Patient confirmed pharmacy of choice, [facility], [place], [place].  Prescription medication submitted to patient's pharmacy of choice per this staff member via [employer].  Patient verbalized understanding.

## 2018-12-11 ENCOUNTER — OFFICE VISIT (OUTPATIENT)
Dept: GASTROENTEROLOGY | Facility: CLINIC | Age: 58
End: 2018-12-11

## 2018-12-11 VITALS
HEIGHT: 63 IN | BODY MASS INDEX: 22.96 KG/M2 | HEART RATE: 78 BPM | WEIGHT: 129.6 LBS | DIASTOLIC BLOOD PRESSURE: 68 MMHG | SYSTOLIC BLOOD PRESSURE: 120 MMHG

## 2018-12-11 DIAGNOSIS — R13.10 DYSPHAGIA, UNSPECIFIED TYPE: Primary | ICD-10-CM

## 2018-12-11 PROCEDURE — 99213 OFFICE O/P EST LOW 20 MIN: CPT | Performed by: INTERNAL MEDICINE

## 2018-12-11 RX ORDER — DEXTROSE AND SODIUM CHLORIDE 5; .45 G/100ML; G/100ML
30 INJECTION, SOLUTION INTRAVENOUS CONTINUOUS PRN
Status: CANCELLED | OUTPATIENT
Start: 2018-12-14

## 2018-12-11 NOTE — H&P (VIEW-ONLY)
[facility]      Chief Complaint:   Chief Complaint   Patient presents with   • Difficulty Swallowing       Subjective     HPI:   Patient with dysphagia.  Patient states that food is beginning to get caught when she swallows.  Patient gets marked improvement in dysphagia with dilatation.    Plan; we'll schedule patient for EGD with dilatation.    Past Medical History:   Past Medical History:   Diagnosis Date   • Abnormal weight loss    • Anxiety    • Atrophic vaginitis    • Depression    • Dysphagia     pharyngoesophageal phase      • Encounter for surgical aftercare following surgery of digestive system     Walter Michael (thoracic esophagectomy, lymphadenectomly, Bronchoscopy, Placement on Q. 3/1/16      • History of esophagogastroduodenoscopy     Normal hypopharynx.A tumor was found in the middle third of the esophagus.Mul.biopsies taken.Normal GE junction.Mild nonerosive gastritis in antrum.Biopsies taken.Normal pylorus and duodenum. 09/24/2015       • History of hysteroscopy     Hysteroscopy, dilation and curettage, and repair of vaginal laceration. 08/03/2013       • History of substance abuse    • Hypertension    • Lumbosacral strain    • Myopia    • Presbyopia    • Primary malignant neoplasm of thoracic part of esophagus (CMS/HCC)     Squamous cell   • Tobacco dependence     1 ppd       Family History:  Family History   Problem Relation Age of Onset   • Ovarian cancer Mother    • Cancer Mother    • Osteoporosis Other    • Heart disease Other    • Diabetes Other    • Cancer Other         Colorectal-Parent   • Asthma Other    • Asthma Sister    • Diabetes Sister    • Hypertension Sister    • Alcohol abuse Brother    • Cancer Maternal Uncle        Social History:   reports that she quit smoking about 3 years ago. she has never used smokeless tobacco. She reports that she does not drink alcohol or use drugs.    Medications:   Prior to Admission medications    Medication Sig Start Date End  Date Taking? Authorizing Provider   albuterol (PROVENTIL HFA;VENTOLIN HFA) 108 (90 BASE) MCG/ACT inhaler Inhale 2 puffs Every 4 (Four) Hours As Needed for wheezing. 2/17/17  Yes Tiffany Jimenez MD   amLODIPine (NORVASC) 10 MG tablet Take 10 mg by mouth Daily. 11/13/17  Yes Zoey Cruz MD   dicyclomine (BENTYL) 20 MG tablet Take 20 mg by mouth Every 6 (Six) Hours.   Yes Zoey Cruz MD   famotidine (PEPCID) 20 MG tablet Take 20 mg by mouth Daily. 11/13/17  Yes Zoey Cruz MD   hydrochlorothiazide (HYDRODIURIL) 12.5 MG tablet Take 12.5 mg by mouth Daily. 3/16/18  Yes Zoey rCuz MD   HYDROcodone-acetaminophen (NORCO)  MG per tablet TK 1 T PO TID PRN. 5/7/18  Yes Zoey Cruz MD   hydrOXYzine (VISTARIL) 25 MG capsule Take 25 mg by mouth every night at bedtime. 3/14/18  Yes Zoey Cruz MD   mirtazapine (REMERON) 15 MG tablet 30 mg Every Night. 11/16/17  Yes Zoey Cruz MD   pantoprazole (PROTONIX) 40 MG EC tablet Take 40 mg by mouth Daily.   Yes Zoey Cruz MD   potassium chloride (K-DUR,KLOR-CON) 20 MEQ CR tablet Take 20 mEq by mouth Daily. 11/13/17  Yes Zoey Cruz MD   sucralfate (CARAFATE) 1 g tablet Take 1 tablet by mouth 4 (Four) Times a Day. 8/2/18  Yes Babar Connell MD   buPROPion SR (WELLBUTRIN SR) 150 MG 12 hr tablet Take 150 mg by mouth 2 (Two) Times a Day.    ProviderZoey MD       Allergies:  Penicillins and Strawberry c [ascorbate]    ROS:    Review of Systems   Constitutional: Negative for activity change, appetite change, chills, diaphoresis, fatigue, fever and unexpected weight change.   HENT: Positive for trouble swallowing. Negative for sore throat.    Respiratory: Negative for shortness of breath.    Gastrointestinal: Negative for abdominal distention, abdominal pain, anal bleeding, blood in stool, constipation, diarrhea, nausea, rectal pain and vomiting.   Endocrine: Negative for polydipsia,  "polyphagia and polyuria.   Genitourinary: Negative for difficulty urinating.   Musculoskeletal: Negative for arthralgias.   Skin: Negative for pallor.   Allergic/Immunologic: Negative for food allergies.   Neurological: Negative for weakness and light-headedness.   Psychiatric/Behavioral: Negative for behavioral problems.     Objective     Blood pressure 120/68, pulse 78, height 160 cm (63\"), weight 58.8 kg (129 lb 9.6 oz), last menstrual period 03/17/1989, not currently breastfeeding.    Physical Exam   Constitutional: She is oriented to person, place, and time. She appears well-developed and well-nourished. No distress.   HENT:   Head: Normocephalic and atraumatic.   Cardiovascular: Normal rate, regular rhythm, normal heart sounds and intact distal pulses. Exam reveals no gallop and no friction rub.   No murmur heard.  Pulmonary/Chest: Breath sounds normal. No respiratory distress. She has no wheezes. She has no rales. She exhibits no tenderness.   Abdominal: Soft. Bowel sounds are normal. She exhibits no distension and no mass. There is no tenderness. There is no rebound and no guarding. No hernia.   Musculoskeletal: Normal range of motion. She exhibits no edema.   Neurological: She is alert and oriented to person, place, and time.   Skin: Skin is warm and dry. No rash noted. She is not diaphoretic. No erythema. No pallor.   Psychiatric: She has a normal mood and affect. Her behavior is normal. Judgment and thought content normal.        Assessment/Plan   Maddison was seen today for difficulty swallowing.    Diagnoses and all orders for this visit:    Dysphagia, unspecified type  -     Case Request; Standing  -     dextrose 5 % and sodium chloride 0.45 % infusion; Infuse 30 mL/hr into a venous catheter Continuous As Needed (Start Prior to Procedure).  -     Case Request    Other orders  -     Follow Anesthesia Guidelines / Standing Orders; Future  -     Implement Anesthesia Orders Day of Procedure; Standing  -     " Obtain Informed Consent; Standing  -     POC Glucose Once; Standing        ESOPHAGOGASTRODUODENOSCOPY (N/A)     Diagnosis Plan   1. Dysphagia, unspecified type  Case Request    dextrose 5 % and sodium chloride 0.45 % infusion    Case Request       Anticipated Surgical Procedure:  Orders Placed This Encounter   Procedures   • Follow Anesthesia Guidelines / Standing Orders     Standing Status:   Future       The risks, benefits, and alternatives of this procedure have been discussed with the patient or the responsible party- the patient understands and agrees to proceed.

## 2018-12-11 NOTE — PROGRESS NOTES
[facility]      Chief Complaint:   Chief Complaint   Patient presents with   • Difficulty Swallowing       Subjective     HPI:   Patient with dysphagia.  Patient states that food is beginning to get caught when she swallows.  Patient gets marked improvement in dysphagia with dilatation.    Plan; we'll schedule patient for EGD with dilatation.    Past Medical History:   Past Medical History:   Diagnosis Date   • Abnormal weight loss    • Anxiety    • Atrophic vaginitis    • Depression    • Dysphagia     pharyngoesophageal phase      • Encounter for surgical aftercare following surgery of digestive system     Walter Michael (thoracic esophagectomy, lymphadenectomly, Bronchoscopy, Placement on Q. 3/1/16      • History of esophagogastroduodenoscopy     Normal hypopharynx.A tumor was found in the middle third of the esophagus.Mul.biopsies taken.Normal GE junction.Mild nonerosive gastritis in antrum.Biopsies taken.Normal pylorus and duodenum. 09/24/2015       • History of hysteroscopy     Hysteroscopy, dilation and curettage, and repair of vaginal laceration. 08/03/2013       • History of substance abuse    • Hypertension    • Lumbosacral strain    • Myopia    • Presbyopia    • Primary malignant neoplasm of thoracic part of esophagus (CMS/HCC)     Squamous cell   • Tobacco dependence     1 ppd       Family History:  Family History   Problem Relation Age of Onset   • Ovarian cancer Mother    • Cancer Mother    • Osteoporosis Other    • Heart disease Other    • Diabetes Other    • Cancer Other         Colorectal-Parent   • Asthma Other    • Asthma Sister    • Diabetes Sister    • Hypertension Sister    • Alcohol abuse Brother    • Cancer Maternal Uncle        Social History:   reports that she quit smoking about 3 years ago. she has never used smokeless tobacco. She reports that she does not drink alcohol or use drugs.    Medications:   Prior to Admission medications    Medication Sig Start Date End  Date Taking? Authorizing Provider   albuterol (PROVENTIL HFA;VENTOLIN HFA) 108 (90 BASE) MCG/ACT inhaler Inhale 2 puffs Every 4 (Four) Hours As Needed for wheezing. 2/17/17  Yes Tiffany Jimenez MD   amLODIPine (NORVASC) 10 MG tablet Take 10 mg by mouth Daily. 11/13/17  Yes Zoey Cruz MD   dicyclomine (BENTYL) 20 MG tablet Take 20 mg by mouth Every 6 (Six) Hours.   Yes Zoey Cruz MD   famotidine (PEPCID) 20 MG tablet Take 20 mg by mouth Daily. 11/13/17  Yes Zoey Cruz MD   hydrochlorothiazide (HYDRODIURIL) 12.5 MG tablet Take 12.5 mg by mouth Daily. 3/16/18  Yes Zoey Cruz MD   HYDROcodone-acetaminophen (NORCO)  MG per tablet TK 1 T PO TID PRN. 5/7/18  Yes Zoey Cruz MD   hydrOXYzine (VISTARIL) 25 MG capsule Take 25 mg by mouth every night at bedtime. 3/14/18  Yes Zoey Cruz MD   mirtazapine (REMERON) 15 MG tablet 30 mg Every Night. 11/16/17  Yes Zoey Cruz MD   pantoprazole (PROTONIX) 40 MG EC tablet Take 40 mg by mouth Daily.   Yes Zoey Cruz MD   potassium chloride (K-DUR,KLOR-CON) 20 MEQ CR tablet Take 20 mEq by mouth Daily. 11/13/17  Yes Zoey Cruz MD   sucralfate (CARAFATE) 1 g tablet Take 1 tablet by mouth 4 (Four) Times a Day. 8/2/18  Yes Babar Connell MD   buPROPion SR (WELLBUTRIN SR) 150 MG 12 hr tablet Take 150 mg by mouth 2 (Two) Times a Day.    ProviderZoey MD       Allergies:  Penicillins and Strawberry c [ascorbate]    ROS:    Review of Systems   Constitutional: Negative for activity change, appetite change, chills, diaphoresis, fatigue, fever and unexpected weight change.   HENT: Positive for trouble swallowing. Negative for sore throat.    Respiratory: Negative for shortness of breath.    Gastrointestinal: Negative for abdominal distention, abdominal pain, anal bleeding, blood in stool, constipation, diarrhea, nausea, rectal pain and vomiting.   Endocrine: Negative for polydipsia,  "polyphagia and polyuria.   Genitourinary: Negative for difficulty urinating.   Musculoskeletal: Negative for arthralgias.   Skin: Negative for pallor.   Allergic/Immunologic: Negative for food allergies.   Neurological: Negative for weakness and light-headedness.   Psychiatric/Behavioral: Negative for behavioral problems.     Objective     Blood pressure 120/68, pulse 78, height 160 cm (63\"), weight 58.8 kg (129 lb 9.6 oz), last menstrual period 03/17/1989, not currently breastfeeding.    Physical Exam   Constitutional: She is oriented to person, place, and time. She appears well-developed and well-nourished. No distress.   HENT:   Head: Normocephalic and atraumatic.   Cardiovascular: Normal rate, regular rhythm, normal heart sounds and intact distal pulses. Exam reveals no gallop and no friction rub.   No murmur heard.  Pulmonary/Chest: Breath sounds normal. No respiratory distress. She has no wheezes. She has no rales. She exhibits no tenderness.   Abdominal: Soft. Bowel sounds are normal. She exhibits no distension and no mass. There is no tenderness. There is no rebound and no guarding. No hernia.   Musculoskeletal: Normal range of motion. She exhibits no edema.   Neurological: She is alert and oriented to person, place, and time.   Skin: Skin is warm and dry. No rash noted. She is not diaphoretic. No erythema. No pallor.   Psychiatric: She has a normal mood and affect. Her behavior is normal. Judgment and thought content normal.        Assessment/Plan   Maddison was seen today for difficulty swallowing.    Diagnoses and all orders for this visit:    Dysphagia, unspecified type  -     Case Request; Standing  -     dextrose 5 % and sodium chloride 0.45 % infusion; Infuse 30 mL/hr into a venous catheter Continuous As Needed (Start Prior to Procedure).  -     Case Request    Other orders  -     Follow Anesthesia Guidelines / Standing Orders; Future  -     Implement Anesthesia Orders Day of Procedure; Standing  -     " Obtain Informed Consent; Standing  -     POC Glucose Once; Standing        ESOPHAGOGASTRODUODENOSCOPY (N/A)     Diagnosis Plan   1. Dysphagia, unspecified type  Case Request    dextrose 5 % and sodium chloride 0.45 % infusion    Case Request       Anticipated Surgical Procedure:  Orders Placed This Encounter   Procedures   • Follow Anesthesia Guidelines / Standing Orders     Standing Status:   Future       The risks, benefits, and alternatives of this procedure have been discussed with the patient or the responsible party- the patient understands and agrees to proceed.

## 2018-12-14 ENCOUNTER — ANESTHESIA EVENT (OUTPATIENT)
Dept: GASTROENTEROLOGY | Facility: HOSPITAL | Age: 58
End: 2018-12-14

## 2018-12-14 ENCOUNTER — HOSPITAL ENCOUNTER (OUTPATIENT)
Facility: HOSPITAL | Age: 58
Setting detail: HOSPITAL OUTPATIENT SURGERY
Discharge: HOME | End: 2018-12-14
Attending: INTERNAL MEDICINE | Admitting: INTERNAL MEDICINE

## 2018-12-14 ENCOUNTER — ANESTHESIA (OUTPATIENT)
Dept: GASTROENTEROLOGY | Facility: HOSPITAL | Age: 58
End: 2018-12-14

## 2018-12-14 VITALS
TEMPERATURE: 97.1 F | SYSTOLIC BLOOD PRESSURE: 118 MMHG | WEIGHT: 129.41 LBS | HEIGHT: 63 IN | DIASTOLIC BLOOD PRESSURE: 79 MMHG | RESPIRATION RATE: 18 BRPM | HEART RATE: 66 BPM | BODY MASS INDEX: 22.93 KG/M2 | OXYGEN SATURATION: 99 %

## 2018-12-14 DIAGNOSIS — R13.10 DYSPHAGIA, UNSPECIFIED TYPE: ICD-10-CM

## 2018-12-14 PROCEDURE — 43235 EGD DIAGNOSTIC BRUSH WASH: CPT | Performed by: INTERNAL MEDICINE

## 2018-12-14 PROCEDURE — 25010000002 PROPOFOL 10 MG/ML EMULSION: Performed by: NURSE ANESTHETIST, CERTIFIED REGISTERED

## 2018-12-14 RX ORDER — DEXAMETHASONE SODIUM PHOSPHATE 4 MG/ML
8 INJECTION, SOLUTION INTRA-ARTICULAR; INTRALESIONAL; INTRAMUSCULAR; INTRAVENOUS; SOFT TISSUE ONCE AS NEEDED
Status: DISCONTINUED | OUTPATIENT
Start: 2018-12-14 | End: 2018-12-14 | Stop reason: HOSPADM

## 2018-12-14 RX ORDER — PROMETHAZINE HYDROCHLORIDE 25 MG/ML
12.5 INJECTION, SOLUTION INTRAMUSCULAR; INTRAVENOUS ONCE AS NEEDED
Status: DISCONTINUED | OUTPATIENT
Start: 2018-12-14 | End: 2018-12-14 | Stop reason: HOSPADM

## 2018-12-14 RX ORDER — PROMETHAZINE HYDROCHLORIDE 25 MG/1
25 SUPPOSITORY RECTAL ONCE AS NEEDED
Status: DISCONTINUED | OUTPATIENT
Start: 2018-12-14 | End: 2018-12-14 | Stop reason: HOSPADM

## 2018-12-14 RX ORDER — LIDOCAINE HYDROCHLORIDE 10 MG/ML
INJECTION, SOLUTION INFILTRATION; PERINEURAL AS NEEDED
Status: DISCONTINUED | OUTPATIENT
Start: 2018-12-14 | End: 2018-12-14 | Stop reason: SURG

## 2018-12-14 RX ORDER — ONDANSETRON 2 MG/ML
4 INJECTION INTRAMUSCULAR; INTRAVENOUS ONCE AS NEEDED
Status: DISCONTINUED | OUTPATIENT
Start: 2018-12-14 | End: 2018-12-14 | Stop reason: HOSPADM

## 2018-12-14 RX ORDER — DEXTROSE AND SODIUM CHLORIDE 5; .45 G/100ML; G/100ML
30 INJECTION, SOLUTION INTRAVENOUS CONTINUOUS PRN
Status: DISCONTINUED | OUTPATIENT
Start: 2018-12-14 | End: 2018-12-14 | Stop reason: HOSPADM

## 2018-12-14 RX ORDER — PROPOFOL 10 MG/ML
VIAL (ML) INTRAVENOUS AS NEEDED
Status: DISCONTINUED | OUTPATIENT
Start: 2018-12-14 | End: 2018-12-14 | Stop reason: SURG

## 2018-12-14 RX ORDER — PROMETHAZINE HYDROCHLORIDE 25 MG/1
25 TABLET ORAL ONCE AS NEEDED
Status: DISCONTINUED | OUTPATIENT
Start: 2018-12-14 | End: 2018-12-14 | Stop reason: HOSPADM

## 2018-12-14 RX ADMIN — PROPOFOL 130 MG: 10 INJECTION, EMULSION INTRAVENOUS at 09:30

## 2018-12-14 RX ADMIN — DEXTROSE AND SODIUM CHLORIDE 30 ML/HR: 5; 450 INJECTION, SOLUTION INTRAVENOUS at 09:18

## 2018-12-14 RX ADMIN — LIDOCAINE HYDROCHLORIDE 100 MG: 10 INJECTION, SOLUTION INFILTRATION; PERINEURAL at 09:30

## 2018-12-14 NOTE — ANESTHESIA PREPROCEDURE EVALUATION
Anesthesia Evaluation                  Airway   No difficulty expected  Dental      Pulmonary - normal exam   (+) COPD moderate,   Cardiovascular - normal exam    (+) hypertension,       Neuro/Psych  (+) psychiatric history Depression,     GI/Hepatic/Renal/Endo    (+)  GERD,      Musculoskeletal (-) negative ROS    Abdominal  - normal exam   Substance History - negative use     OB/GYN negative ob/gyn ROS         Other      history of cancer                    Anesthesia Plan    ASA 2     MAC     intravenous induction   Anesthetic plan, all risks, benefits, and alternatives have been provided, discussed and informed consent has been obtained with: patient.    Plan discussed with CRNA.

## 2018-12-14 NOTE — ANESTHESIA POSTPROCEDURE EVALUATION
Patient: Maddison Sheridan    Procedure Summary     Date:  12/14/18 Room / Location:  [facility] ENDOSCOPY 1 / [facility] ENDOSCOPY    Anesthesia Start:  0925 Anesthesia Stop:  0941    Procedure:  ESOPHAGOGASTRODUODENOSCOPY (N/A ) Diagnosis:       Dysphagia, unspecified type      (Dysphagia, unspecified type [R13.10])    Surgeon:  Babar Connell MD Provider:  Wilbert Rogers CRNA    Anesthesia Type:  MAC ASA Status:  2          Anesthesia Type: MAC  Last vitals  BP   148/77 (12/14/18 0910)   Temp   97.4 °F (36.3 °C) (12/14/18 0910)   Pulse   69 (12/14/18 0910)   Resp   18 (12/14/18 0910)     SpO2   98 % (12/14/18 0910)     Post Anesthesia Care and Evaluation    Patient location during evaluation: bedside  Patient participation: complete - patient participated  Level of consciousness: awake and alert  Pain score: 1  Pain management: adequate  Airway patency: patent  Anesthetic complications: No anesthetic complications  PONV Status: none  Cardiovascular status: acceptable  Respiratory status: acceptable  Hydration status: acceptable  Post Neuraxial Block status: Motor and sensory function returned to baseline

## 2018-12-18 ENCOUNTER — OFFICE VISIT (OUTPATIENT)
Dept: GASTROENTEROLOGY | Facility: CLINIC | Age: 58
End: 2018-12-18

## 2018-12-18 VITALS
WEIGHT: 131.8 LBS | BODY MASS INDEX: 23.35 KG/M2 | SYSTOLIC BLOOD PRESSURE: 122 MMHG | HEART RATE: 68 BPM | DIASTOLIC BLOOD PRESSURE: 66 MMHG | HEIGHT: 63 IN | OXYGEN SATURATION: 97 %

## 2018-12-18 DIAGNOSIS — R68.81 EARLY SATIETY: Primary | ICD-10-CM

## 2018-12-18 PROCEDURE — 99214 OFFICE O/P EST MOD 30 MIN: CPT | Performed by: INTERNAL MEDICINE

## 2018-12-18 NOTE — PROGRESS NOTES
[facility]      Chief Complaint:   Chief Complaint   Patient presents with   • Difficulty Swallowing       Subjective     HPI:   Patient with early satiety and dysphagia.  Patient had EGD which did not show a true stricture in the esophagus but did show continued food in the stomach.  Patient has had multiple EGDs with food in the stomach.  Patient denies any nausea or vomiting but definitely gets early satiety.    Plan; we'll schedule patient for a gastric emptying study to see if the problem lies in the patient's stomach emptying.  We'll stop patient's Bentyl at this time as patient is not having diarrhea or abdominal cramping this may be contributing to the poor GI motility.    Past Medical History:   Past Medical History:   Diagnosis Date   • Abnormal weight loss    • Anxiety    • Atrophic vaginitis    • Depression    • Dysphagia     pharyngoesophageal phase      • Encounter for surgical aftercare following surgery of digestive system     Walter Michael (thoracic esophagectomy, lymphadenectomly, Bronchoscopy, Placement on Q. 3/1/16      • History of esophagogastroduodenoscopy     Normal hypopharynx.A tumor was found in the middle third of the esophagus.Mul.biopsies taken.Normal GE junction.Mild nonerosive gastritis in antrum.Biopsies taken.Normal pylorus and duodenum. 09/24/2015       • History of hysteroscopy     Hysteroscopy, dilation and curettage, and repair of vaginal laceration. 08/03/2013       • History of substance abuse    • Hypertension    • Lumbosacral strain    • Myopia    • Presbyopia    • Primary malignant neoplasm of thoracic part of esophagus (CMS/HCC)     Squamous cell   • Tobacco dependence     1 ppd       Family History:  Family History   Problem Relation Age of Onset   • Ovarian cancer Mother    • Cancer Mother    • Osteoporosis Other    • Heart disease Other    • Diabetes Other    • Cancer Other         Colorectal-Parent   • Asthma Other    • Asthma Sister    • Diabetes  Sister    • Hypertension Sister    • Alcohol abuse Brother    • Cancer Maternal Uncle        Social History:   reports that she quit smoking about 3 years ago. Her smoking use included cigarettes. she has never used smokeless tobacco. She reports that she does not drink alcohol or use drugs.    Medications:   Prior to Admission medications    Medication Sig Start Date End Date Taking? Authorizing Provider   albuterol (PROVENTIL HFA;VENTOLIN HFA) 108 (90 BASE) MCG/ACT inhaler Inhale 2 puffs Every 4 (Four) Hours As Needed for wheezing. 2/17/17  Yes Tiffany Jimenez MD   amLODIPine (NORVASC) 10 MG tablet Take 10 mg by mouth Daily. 11/13/17  Yes Zoey Cruz MD   hydrochlorothiazide (HYDRODIURIL) 12.5 MG tablet Take 12.5 mg by mouth Daily. 3/16/18  Yes Zoey Cruz MD   HYDROcodone-acetaminophen (NORCO)  MG per tablet TK 1 T PO TID PRN. 5/7/18  Yes Zoey Cruz MD   hydrOXYzine (VISTARIL) 25 MG capsule Take 25 mg by mouth every night at bedtime. 3/14/18  Yes Zoey Cruz MD   mirtazapine (REMERON) 15 MG tablet 30 mg Every Night. 11/16/17  Yes Zoey Cruz MD   sucralfate (CARAFATE) 1 g tablet Take 1 tablet by mouth 4 (Four) Times a Day. 8/2/18  Yes Babar Connell MD   dicyclomine (BENTYL) 20 MG tablet Take 20 mg by mouth Every 6 (Six) Hours.  12/18/18 Yes Zoey Cruz MD   buPROPion SR (WELLBUTRIN SR) 150 MG 12 hr tablet Take 150 mg by mouth 2 (Two) Times a Day.  12/18/18  Zoey Cruz MD   famotidine (PEPCID) 20 MG tablet Take 20 mg by mouth Daily. 11/13/17 12/18/18  Zoey Cruz MD   pantoprazole (PROTONIX) 40 MG EC tablet Take 40 mg by mouth Daily.  12/18/18  Zoey Cruz MD   potassium chloride (K-DUR,KLOR-CON) 20 MEQ CR tablet Take 20 mEq by mouth Daily. 11/13/17 12/18/18  Zoey Cruz MD       Allergies:  Penicillins and Strawberry c [ascorbate]    ROS:    Review of Systems   Constitutional: Negative for activity  "change, appetite change, chills, diaphoresis, fatigue, fever and unexpected weight change.   HENT: Negative for sore throat and trouble swallowing.    Respiratory: Negative for shortness of breath.    Gastrointestinal: Positive for abdominal pain. Negative for abdominal distention, anal bleeding, blood in stool, constipation, diarrhea, nausea, rectal pain and vomiting.   Endocrine: Negative for polydipsia, polyphagia and polyuria.   Genitourinary: Negative for difficulty urinating.   Musculoskeletal: Negative for arthralgias.   Skin: Negative for pallor.   Allergic/Immunologic: Negative for food allergies.   Neurological: Negative for weakness and light-headedness.   Psychiatric/Behavioral: Negative for behavioral problems.     Objective     Blood pressure 122/66, pulse 68, height 160 cm (63\"), weight 59.8 kg (131 lb 12.8 oz), last menstrual period 03/17/1989, SpO2 97 %, not currently breastfeeding.    Physical Exam   Constitutional: She is oriented to person, place, and time. She appears well-developed and well-nourished. No distress.   HENT:   Head: Normocephalic and atraumatic.   Cardiovascular: Normal rate, regular rhythm, normal heart sounds and intact distal pulses. Exam reveals no gallop and no friction rub.   No murmur heard.  Pulmonary/Chest: Breath sounds normal. No respiratory distress. She has no wheezes. She has no rales. She exhibits no tenderness.   Abdominal: Soft. Bowel sounds are normal. She exhibits no distension and no mass. There is no tenderness. There is no rebound and no guarding. No hernia.   Musculoskeletal: Normal range of motion. She exhibits no edema.   Neurological: She is alert and oriented to person, place, and time.   Skin: Skin is warm and dry. No rash noted. She is not diaphoretic. No erythema. No pallor.   Psychiatric: She has a normal mood and affect. Her behavior is normal. Judgment and thought content normal.        Assessment/Plan   Maddison was seen today for difficulty " swallowing.    Diagnoses and all orders for this visit:    Early satiety  -     NM Gastric Emptying; Future        * Surgery not found *     Diagnosis Plan   1. Early satiety  NM Gastric Emptying       Anticipated Surgical Procedure:  Orders Placed This Encounter   Procedures   • NM Gastric Emptying     Standing Status:   Future     Standing Expiration Date:   12/18/2019     Order Specific Question:   Reason for Exam:     Answer:   retained food     Order Specific Question:   Is the patient breastfeeding?     Answer:   No       The risks, benefits, and alternatives of this procedure have been discussed with the patient or the responsible party- the patient understands and agrees to proceed.

## 2018-12-18 NOTE — PATIENT INSTRUCTIONS
Dysphagia  Dysphagia is trouble swallowing. This condition occurs when solids and liquids stick in a person's throat on the way down to the stomach, or when food takes longer to get to the stomach. You may have problems swallowing food, liquids, or both. You may also have pain while trying to swallow. It may take you more time and effort to swallow something.  What are the causes?  This condition is caused by:  · Problems with the muscles. They may make it difficult for you to move food and liquids through the tube that connects your mouth to your stomach (esophagus). You may have ulcers, scar tissue, or inflammation that blocks the normal passage of food and liquids. Causes of these problems include:  ? Acid reflux from your stomach into your esophagus (gastroesophageal reflux).  ? Infections.  ? Radiation treatment for cancer.  ? Medicines taken without enough fluids to wash them down into your stomach.  · Nerve problems. These prevent signals from being sent to the muscles of your esophagus to squeeze (contract) and move what you swallow down to your stomach.  · Globus pharyngeus. This is a common problem that involves feeling like something is stuck in the throat or a sense of trouble with swallowing even though nothing is wrong with the swallowing passages.  · Stroke. This can affect the nerves and make it difficult to swallow.  · Certain conditions, such as cerebral palsy or Parkinson disease.    What are the signs or symptoms?  Common symptoms of this condition include:  · A feeling that solids or liquids are stuck in your throat on the way down to the stomach.  · Food taking too long to get to the stomach.    Other symptoms include:  · Food moving back from your stomach to your mouth (regurgitation).  · Noises coming from your throat.  · Chest discomfort with swallowing.  · A feeling of fullness when swallowing.  · Drooling, especially when the throat is blocked.  · Pain while  swallowing.  · Heartburn.  · Coughing or gagging while trying to swallow.    How is this diagnosed?  This condition is diagnosed by:  · Barium X-ray. In this test, you swallow a white substance (contrast medium)that sticks to the inside of your esophagus. X-ray images are then taken.  · Endoscopy. In this test, a flexible telescope is inserted down your throat to look at your esophagus and your stomach.  · CT scans and MRI.    How is this treated?  Treatment for dysphagia depends on the cause of the condition:  · If the dysphagia is caused by acid reflux or infection, medicines may be used. They may include antibiotics and heartburn medicines.  · If the dysphagia is caused by problems with your muscles, swallowing therapy may be used to help you strengthen your swallowing muscles. You may have to do specific exercises to strengthen the muscles or stretch them.  · If the dysphagia is caused by a blockage or mass, procedures to remove the blockage may be done. You may need surgery and a feeding tube.    You may need to make diet changes. Ask your health care provider for specific instructions.  Follow these instructions at home:  Eating and drinking  · Try to eat soft food that is easier to swallow.  · Follow any diet changes as told by your health care provider.  · Cut your food into small pieces and eat slowly.  · Eat and drink only when you are sitting upright.  · Do not drink alcohol or caffeine. If you need help quitting, ask your health care provider.  General instructions  · Check your weight every day to make sure you are not losing weight.  · Take over-the-counter and prescription medicines only as told by your health care provider.  · If you were prescribed an antibiotic medicine, take it as told by your health care provider. Do not stop taking the antibiotic even if you start to feel better.  · Do not use any products that contain nicotine or tobacco, such as cigarettes and e-cigarettes. If you need help  quitting, ask your health care provider.  · Keep all follow-up visits as told by your health care provider. This is important.  Contact a health care provider if:  · You lose weight because you cannot swallow.  · You cough when you drink liquids (aspiration).  · You cough up partially digested food.  Get help right away if:  · You cannot swallow your saliva.  · You have shortness of breath or a fever, or both.  · You have a hoarse voice and also have trouble swallowing.  Summary  · Dysphagia is trouble swallowing. This condition occurs when solids and liquids stick in a person's throat on the way down to the stomach, or when food takes longer to get to the stomach.  · Dysphagia has many possible causes and symptoms.  · Treatment for dysphagia depends on the cause of the condition.  This information is not intended to replace advice given to you by your health care provider. Make sure you discuss any questions you have with your health care provider.  Document Released: 12/15/2001 Document Revised: 12/07/2017 Document Reviewed: 12/07/2017  [employer] Interactive Patient Education © 2017 [employer]  BMI for Adults  Body mass index (BMI) is a number that is calculated from a person's weight and height. In most adults, the number is used to find how much of an adult's weight is made up of fat. BMI is not as accurate as a direct measure of body fat.  How is BMI calculated?  BMI is calculated by dividing weight in kilograms by height in meters squared. It can also be calculated by dividing weight in pounds by height in inches squared, then multiplying the resulting number by 703. Charts are available to help you find your BMI quickly and easily without doing this calculation.  How is BMI interpreted?  Health care professionals use BMI charts to identify whether an adult is underweight, at a normal weight, or overweight based on the following guidelines:  · Underweight: BMI less than 18.5.  · Normal weight: BMI between  18.5 and 24.9.  · Overweight: BMI between 25 and 29.9.  · Obese: BMI of 30 and above.    BMI is usually interpreted the same for males and females.  Weight includes both fat and muscle, so someone with a muscular build, such as an athlete, may have a BMI that is higher than 24.9. In cases like these, BMI may not accurately depict body fat. To determine if excess body fat is the cause of a BMI of 25 or higher, further assessments may need to be done by a health care provider.  Why is BMI a useful tool?  BMI is used to identify a possible weight problem that may be related to a medical problem or may increase the risk for medical problems. BMI can also be used to promote changes to reach a healthy weight.  This information is not intended to replace advice given to you by your health care provider. Make sure you discuss any questions you have with your health care provider.  Document Released: 08/29/2005 Document Revised: 04/27/2017 Document Reviewed: 05/15/2015  [employer] Interactive Patient Education © 2018 [employer]

## 2018-12-19 DIAGNOSIS — C15.4 MALIGNANT NEOPLASM OF THORACIC ESOPHAGUS (HCC): Primary | ICD-10-CM

## 2018-12-20 ENCOUNTER — LAB (OUTPATIENT)
Dept: ONCOLOGY | Facility: HOSPITAL | Age: 58
End: 2018-12-20

## 2018-12-20 ENCOUNTER — OFFICE VISIT (OUTPATIENT)
Dept: ONCOLOGY | Facility: CLINIC | Age: 58
End: 2018-12-20

## 2018-12-20 VITALS
RESPIRATION RATE: 18 BRPM | OXYGEN SATURATION: 98 % | HEIGHT: 63 IN | WEIGHT: 130 LBS | BODY MASS INDEX: 23.04 KG/M2 | DIASTOLIC BLOOD PRESSURE: 76 MMHG | TEMPERATURE: 98.5 F | SYSTOLIC BLOOD PRESSURE: 119 MMHG | HEART RATE: 62 BPM

## 2018-12-20 DIAGNOSIS — C15.4 MALIGNANT NEOPLASM OF THORACIC ESOPHAGUS (HCC): ICD-10-CM

## 2018-12-20 DIAGNOSIS — C15.4 MALIGNANT NEOPLASM OF THORACIC ESOPHAGUS (HCC): Primary | ICD-10-CM

## 2018-12-20 LAB
ALBUMIN SERPL-MCNC: 4 G/DL (ref 3.4–4.8)
ALBUMIN/GLOB SERPL: 1.3 G/DL (ref 1.1–1.8)
ALP SERPL-CCNC: 82 U/L (ref 38–126)
ALT SERPL W P-5'-P-CCNC: 16 U/L (ref 9–52)
ANION GAP SERPL CALCULATED.3IONS-SCNC: 7 MMOL/L (ref 5–15)
AST SERPL-CCNC: 18 U/L (ref 14–36)
BASOPHILS # BLD AUTO: 0.02 10*3/MM3 (ref 0–0.2)
BASOPHILS NFR BLD AUTO: 0.3 % (ref 0–2)
BILIRUB SERPL-MCNC: 0.4 MG/DL (ref 0.2–1.3)
BUN BLD-MCNC: 15 MG/DL (ref 7–21)
BUN/CREAT SERPL: 16 (ref 7–25)
CALCIUM SPEC-SCNC: 8.8 MG/DL (ref 8.4–10.2)
CHLORIDE SERPL-SCNC: 100 MMOL/L (ref 95–110)
CO2 SERPL-SCNC: 31 MMOL/L (ref 22–31)
CREAT BLD-MCNC: 0.94 MG/DL (ref 0.5–1)
DEPRECATED RDW RBC AUTO: 43.9 FL (ref 36.4–46.3)
EOSINOPHIL # BLD AUTO: 0.06 10*3/MM3 (ref 0–0.7)
EOSINOPHIL NFR BLD AUTO: 0.9 % (ref 0–7)
ERYTHROCYTE [DISTWIDTH] IN BLOOD BY AUTOMATED COUNT: 13.1 % (ref 11.5–14.5)
GFR SERPL CREATININE-BSD FRML MDRD: 74 ML/MIN/1.73 (ref 45–104)
GLOBULIN UR ELPH-MCNC: 3 GM/DL (ref 2.3–3.5)
GLUCOSE BLD-MCNC: 103 MG/DL (ref 60–100)
HCT VFR BLD AUTO: 37.1 % (ref 35–45)
HGB BLD-MCNC: 12.5 G/DL (ref 12–15.5)
IMM GRANULOCYTES # BLD: 0.01 10*3/MM3 (ref 0–0.02)
IMM GRANULOCYTES NFR BLD: 0.2 % (ref 0–0.5)
LYMPHOCYTES # BLD AUTO: 1.16 10*3/MM3 (ref 0.6–4.2)
LYMPHOCYTES NFR BLD AUTO: 17.5 % (ref 10–50)
MCH RBC QN AUTO: 31.1 PG (ref 26.5–34)
MCHC RBC AUTO-ENTMCNC: 33.7 G/DL (ref 31.4–36)
MCV RBC AUTO: 92.3 FL (ref 80–98)
MONOCYTES # BLD AUTO: 0.53 10*3/MM3 (ref 0–0.9)
MONOCYTES NFR BLD AUTO: 8 % (ref 0–12)
NEUTROPHILS # BLD AUTO: 4.86 10*3/MM3 (ref 2–8.6)
NEUTROPHILS NFR BLD AUTO: 73.1 % (ref 37–80)
PLATELET # BLD AUTO: 260 10*3/MM3 (ref 150–450)
PMV BLD AUTO: 10.8 FL (ref 8–12)
POTASSIUM BLD-SCNC: 3.6 MMOL/L (ref 3.5–5.1)
PROT SERPL-MCNC: 7 G/DL (ref 6.3–8.6)
RBC # BLD AUTO: 4.02 10*6/MM3 (ref 3.77–5.16)
SODIUM BLD-SCNC: 138 MMOL/L (ref 137–145)
WBC NRBC COR # BLD: 6.64 10*3/MM3 (ref 3.2–9.8)

## 2018-12-20 PROCEDURE — G0463 HOSPITAL OUTPT CLINIC VISIT: HCPCS | Performed by: NURSE PRACTITIONER

## 2018-12-20 PROCEDURE — 36415 COLL VENOUS BLD VENIPUNCTURE: CPT | Performed by: NURSE PRACTITIONER

## 2018-12-20 PROCEDURE — 85025 COMPLETE CBC W/AUTO DIFF WBC: CPT | Performed by: INTERNAL MEDICINE

## 2018-12-20 PROCEDURE — 99214 OFFICE O/P EST MOD 30 MIN: CPT | Performed by: NURSE PRACTITIONER

## 2018-12-20 PROCEDURE — 80053 COMPREHEN METABOLIC PANEL: CPT | Performed by: INTERNAL MEDICINE

## 2018-12-20 NOTE — PROGRESS NOTES
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 She received concurrent chemotherapy and radiation therapy followed by surgery in March 2016. Pathology showed [id number]. She has required multiple dilatations following surgery for dysphagia. She is no longer following with CT surgery. Suppose to have seen them in January with CT chest. She is having a gastric empty study next month per Dr. Hernandez.      PAST MEDICAL HISTORY:    Past Medical History:   Diagnosis Date   • Abnormal weight loss    • Anxiety    • Atrophic vaginitis    • Depression    • Dysphagia     pharyngoesophageal phase      • Encounter for surgical aftercare following surgery of digestive system     Walter Michael (thoracic esophagectomy, lymphadenectomly, Bronchoscopy, Placement on Q. 3/1/16      • History of esophagogastroduodenoscopy     Normal hypopharynx.A tumor was found in the middle third of the esophagus.Mul.biopsies taken.Normal GE junction.Mild nonerosive gastritis in antrum.Biopsies taken.Normal pylorus and duodenum. 09/24/2015       • History of hysteroscopy     Hysteroscopy, dilation and curettage, and repair of vaginal laceration. 08/03/2013       • History of substance abuse    • Hypertension    • Lumbosacral strain    • Myopia    • Presbyopia    • Primary malignant neoplasm of thoracic part of esophagus (CMS/HCC)     Squamous cell   • Tobacco dependence     1 ppd       SOCIAL HISTORY:    Social History     Tobacco Use   • Smoking status: Former Smoker     Types: Cigarettes     Last attempt to quit: 2015     Years since quitting: 3.9   • Smokeless tobacco: Never Used   • Tobacco comment: 12/18/2018 - Patient reports she utilized approximately 12 Cigarettes per day.  Patient reports she utilized tobacco productss approximately 25 - 30 years.    Substance Use Topics   • Alcohol use: No   • Drug use: No       Surgical History :  Past Surgical History:   Procedure Laterality Date   • ABDOMINAL SURGERY  03/01/2016    Abdominal portion of an Walter Michael esophagogastrectomy with jejunostomy tube placement. Esophageal carcinoma.   • COLONOSCOPY N/A 12/18/2017    Procedure: COLONOSCOPY;  Surgeon: Babar Connell MD;  Location: [facility] ENDOSCOPY;  Service:    • ENDOSCOPY N/A 2/3/2017    Procedure: ESOPHAGOGASTRODUODENOSCOPY;  Surgeon: Babar Connell MD;  Location: [facility] ENDOSCOPY;  Service:    • ENDOSCOPY N/A 5/22/2017    Procedure: ESOPHAGOGASTRODUODENOSCOPY;  Surgeon: Babar Connell MD;  Location: [facility] ENDOSCOPY;  Service:    • ENDOSCOPY N/A 9/22/2017    Procedure: ESOPHAGOGASTRODUODENOSCOPY;  Surgeon: Babar Connell MD;  Location: [facility] ENDOSCOPY;  Service:    • ENDOSCOPY N/A 12/18/2017    Procedure: ESOPHAGOGASTRODUODENOSCOPY;  Surgeon: Babar Connell MD;  Location: [facility] ENDOSCOPY;  Service:    • ENDOSCOPY N/A 6/14/2018    Procedure: ESOPHAGOGASTRODUODENOSCOPY possible dilation;  Surgeon: Babar Connell MD;  Location: [facility] ENDOSCOPY;  Service: Gastroenterology   • ENDOSCOPY N/A 12/14/2018    Procedure: ESOPHAGOGASTRODUODENOSCOPY;  Surgeon: Babar Connell MD;  Location: [facility] ENDOSCOPY;  Service: Gastroenterology   • ESOPHAGUS SURGERY      Thoracic esophagectomy ([place] Michael type).Thoracic lymphadenectomy.Fiberoptic bronchoscopy.Placement of On Q pain pump.Jejunostomy tube placement.Pyloromyotomy. 03/01/2016      • LUMBAR DISC SURGERY     • UPPER GASTROINTESTINAL ENDOSCOPY  02/03/2017   • UPPER GASTROINTESTINAL ENDOSCOPY  05/22/2017   • UPPER GASTROINTESTINAL ENDOSCOPY  09/22/2017   • UPPER GASTROINTESTINAL ENDOSCOPY  12/18/2017   • UPPER GASTROINTESTINAL ENDOSCOPY  06/14/2018   • UPPER GASTROINTESTINAL ENDOSCOPY  12/14/2018       ALLERGIES:    Allergies   Allergen Reactions   • Penicillins Hives   • Strawberry C [Ascorbate] Hives       REVIEW OF  "SYSTEMS:      CONSTITUTIONAL:  No fever, chills, or night sweats.     HEENT:  No epistaxis, mouth sores, or difficulty swallowing.    RESPIRATORY:  No new shortness of breath or cough at present.    CARDIOVASCULAR:  No chest pain or palpitations.    GASTROINTESTINAL:  No abdominal pain, nausea, vomiting, or blood in the stool.    GENITOURINARY:  No dysuria or hematuria.    MUSCULOSKELETAL:  No any new back pain or arthralgias.     NEUROLOGICAL:  No tingling or numbness. No new headache or dizziness.     LYMPHATICS:  Denies any abnormal swollen and anywhere in the body.    SKIN:  Denies any new skin rash.    PHYSICAL EXAMINATION:      VITAL SIGNS:  /76   Pulse 62   Temp 98.5 °F (36.9 °C) (Temporal)   Resp 18   Ht 160 cm (62.99\")   Wt 59 kg (130 lb)   LMP 03/17/1989 (Within Months) Comment: Postmenopausal  SpO2 98%   BMI 23.03 kg/m²     GENERAL:  Not in any distress.    HEENT:  Normocephalic, Atraumatic.Mild Conjunctival pallor. No icterus.  No Facial Asymmetry noted.    NECK:  No adenopathy. No JVD.    RESPIRATORY:  Fair air entry bilateral. No rhonchi or wheezing.    CARDIOVASCULAR:  S1, S2. Regular rate and rhythm. No murmur or gallop appreciated.    ABDOMEN:  Soft, obese, nontender. Bowel sounds present in all four quadrants.  No organomegaly appreciated.    EXTREMITIES:  No edema.No Calf Tenderness.    NEUROLOGIC:  Alert, awake and oriented ×3.     DIAGNOSTIC DATA:    Glucose   Date Value Ref Range Status   12/20/2018 103 (H) 60 - 100 mg/dL Final     Sodium   Date Value Ref Range Status   12/20/2018 138 137 - 145 mmol/L Final     Potassium   Date Value Ref Range Status   12/20/2018 3.6 3.5 - 5.1 mmol/L Final     CO2   Date Value Ref Range Status   12/20/2018 31.0 22.0 - 31.0 mmol/L Final     Chloride   Date Value Ref Range Status   12/20/2018 100 95 - 110 mmol/L Final     Anion Gap   Date Value Ref Range Status   12/20/2018 7.0 5.0 - 15.0 mmol/L Final     Creatinine   Date Value Ref Range Status "   12/20/2018 0.94 0.50 - 1.00 mg/dL Final     BUN   Date Value Ref Range Status   12/20/2018 15 7 - 21 mg/dL Final     BUN/Creatinine Ratio   Date Value Ref Range Status   12/20/2018 16.0 7.0 - 25.0 Final     Calcium   Date Value Ref Range Status   12/20/2018 8.8 8.4 - 10.2 mg/dL Final     Alkaline Phosphatase   Date Value Ref Range Status   12/20/2018 82 38 - 126 U/L Final     Total Protein   Date Value Ref Range Status   12/20/2018 7.0 6.3 - 8.6 g/dL Final     ALT (SGPT)   Date Value Ref Range Status   12/20/2018 16 9 - 52 U/L Final     AST (SGOT)   Date Value Ref Range Status   12/20/2018 18 14 - 36 U/L Final     Total Bilirubin   Date Value Ref Range Status   12/20/2018 0.4 0.2 - 1.3 mg/dL Final     Albumin   Date Value Ref Range Status   12/20/2018 4.00 3.40 - 4.80 g/dL Final     Globulin   Date Value Ref Range Status   12/20/2018 3.0 2.3 - 3.5 gm/dL Final     Lab Results   Component Value Date    WBC 6.64 12/20/2018    HGB 12.5 12/20/2018    HCT 37.1 12/20/2018    MCV 92.3 12/20/2018     12/20/2018     Lab Results   Component Value Date    NEUTROABS 4.86 12/20/2018     No results found for: , LABCA2, AFPTM, HCGQUANT, , CHROMGRNA, 1OVHI95MRP, CEA, REFLABREPO]    PATHOLOGY:  * Cannot find OR log *     RADIOLOGY DATA : Ct C/A 12/2017:    PROCEDURE: CT chest abdomen and pelvis with contrast     REASON FOR EXAM: ca esophagus and dysphagia, C15.9 Malignant  neoplasm of esophagus, unspecified R13.19 Other dysphagia     This exam was performed according to our departmental  dose-optimization program, which includes automated exposure  control, adjustment of the mA and/or kV according to patient size  and/or use of iterative reconstruction technique.     FINDINGS: Comparison study dated March 1, 2017. Axial computer  tomography sequential imaging was performed from the thoracic  inlet through the symphysis pubis after administration of IV  contrast. .Sagittal and coronal reformation was performed  .     Stable postsurgical changes consistent with esophagectomy and  gastric pull-through procedure. Otherwise mediastinal structures  of the chest are unremarkable. No lymphadenopathy or pericardial  effusion. The lungs are clear. Pleural spaces are normal.     Medial and lateral segment left lobe of liver small subcentimeter  oval focus is of enhancement consistent with small flash  hemangioma is not appreciably changed since prior study.  Otherwise the liver is unremarkable. The gallbladder is normal.  The biliary system is normal. The pancreas is normal. The spleen  is normal. Bilateral adrenal glands are normal. Right kidney and  ureter are normal. Left kidney and ureter are normal. Bladder is  normal. Uterus is normal. Hollow viscera in the abdomen is  normal. The appendix is identified appears normal. No  lymphadenopathy in the abdomen or the pelvis. No acute osseous  abnormality.     IMPRESSION:  1. Stable postsurgical changes consistent with prior  esophagectomy and gastric pull-through procedure.  2. Stable medial and lateral segment left lobe of liver small  subcentimeter flash hemangiomas.  3. Otherwise unremarkable CT of the chest abdomen and pelvis  No images are attached to the encounter.      ASSESSMENT AND PLAN:       1. Moderately differentiated squamous cell cancer of esophagus, Stage II, underwent neoadjuvant concurrent chemotherapy and radiation therapy followed by surgery and had a complete CR per pathology. At this time she remain on surveillance. She had  CT scan December 2017 that was unremarkable for any disease recurrence. She did not have CT chest in January, so will place order for that, RTC in 6 mos with repeat labs.      2. Health maintenance, she does not smoke and is up to date with  colonoscopy      3. Hypertension, BP today is 119/76        This document has been signed by AKASH Barton on December 20, 2018 12:44 PM

## 2019-01-03 ENCOUNTER — HOSPITAL ENCOUNTER (OUTPATIENT)
Dept: NUCLEAR MEDICINE | Facility: HOSPITAL | Age: 59
Discharge: HOME | End: 2019-01-03

## 2019-01-03 DIAGNOSIS — R68.81 EARLY SATIETY: ICD-10-CM

## 2019-01-03 PROCEDURE — 78264 GASTRIC EMPTYING IMG STUDY: CPT

## 2019-01-03 PROCEDURE — A9541 TC99M SULFUR COLLOID: HCPCS | Performed by: INTERNAL MEDICINE

## 2019-01-03 PROCEDURE — 0 TECHNETIUM SULFUR COLLOID: Performed by: INTERNAL MEDICINE

## 2019-01-03 RX ADMIN — TECHNETIUM TC 99M SULFUR COLLOID 1 DOSE: KIT at 08:57

## 2019-01-10 ENCOUNTER — OFFICE VISIT (OUTPATIENT)
Dept: GASTROENTEROLOGY | Facility: CLINIC | Age: 59
End: 2019-01-10

## 2019-01-10 VITALS
SYSTOLIC BLOOD PRESSURE: 124 MMHG | BODY MASS INDEX: 23.21 KG/M2 | HEART RATE: 73 BPM | DIASTOLIC BLOOD PRESSURE: 70 MMHG | HEIGHT: 63 IN | WEIGHT: 131 LBS

## 2019-01-10 DIAGNOSIS — K21.9 GASTROESOPHAGEAL REFLUX DISEASE WITHOUT ESOPHAGITIS: Primary | ICD-10-CM

## 2019-01-10 DIAGNOSIS — K22.2 STRICTURE ESOPHAGUS: ICD-10-CM

## 2019-01-10 DIAGNOSIS — R13.19 OTHER DYSPHAGIA: ICD-10-CM

## 2019-01-10 PROCEDURE — 99213 OFFICE O/P EST LOW 20 MIN: CPT | Performed by: INTERNAL MEDICINE

## 2019-01-10 RX ORDER — MIRTAZAPINE 30 MG/1
TABLET, FILM COATED ORAL
Refills: 10 | COMMUNITY
Start: 2018-10-31 | End: 2019-12-12

## 2019-01-10 RX ORDER — DEXLANSOPRAZOLE 60 MG/1
60 CAPSULE, DELAYED RELEASE ORAL AS NEEDED
COMMUNITY
Start: 2019-01-09 | End: 2019-08-22 | Stop reason: SDUPTHER

## 2019-01-10 NOTE — PATIENT INSTRUCTIONS

## 2019-01-10 NOTE — PROGRESS NOTES
[facility]      Chief Complaint:   Chief Complaint   Patient presents with   • Early satiety   • Heartburn       Subjective     HPI:   Patient for follow-up on dysphagia and early satiety.  Patient has a history of esophageal CA resected 2 years ago with gastric pull-up.  Patient had gastric emptying study which shows some delayed emptying of the thoracic gastric portion which was described as normal for lab after having this procedure.  Discussed patient eating small frequent meals.  Patient also is taking Carafate and a proton pump inhibitor.    Plan; we'll continue patient on proton pump inhibitor and Carafate a patient follow-up in 3 months.    Past Medical History:   Past Medical History:   Diagnosis Date   • Abnormal weight loss    • Anxiety    • Atrophic vaginitis    • Depression    • Dysphagia     pharyngoesophageal phase      • Encounter for surgical aftercare following surgery of digestive system     Walter Michael (thoracic esophagectomy, lymphadenectomly, Bronchoscopy, Placement on Q. 3/1/16      • History of esophagogastroduodenoscopy     Normal hypopharynx.A tumor was found in the middle third of the esophagus.Mul.biopsies taken.Normal GE junction.Mild nonerosive gastritis in antrum.Biopsies taken.Normal pylorus and duodenum. 09/24/2015       • History of hysteroscopy     Hysteroscopy, dilation and curettage, and repair of vaginal laceration. 08/03/2013       • History of substance abuse    • Hypertension    • Lumbosacral strain    • Myopia    • Presbyopia    • Primary malignant neoplasm of thoracic part of esophagus (CMS/HCC)     Squamous cell   • Tobacco dependence     1 ppd       Family History:  Family History   Problem Relation Age of Onset   • Ovarian cancer Mother    • Cancer Mother    • Osteoporosis Other    • Heart disease Other    • Diabetes Other    • Cancer Other         Colorectal-Parent   • Asthma Other    • Asthma Sister    • Diabetes Sister    • Hypertension Sister     • Alcohol abuse Brother    • Cancer Maternal Uncle        Social History:   reports that she quit smoking about 4 years ago. Her smoking use included cigarettes. she has never used smokeless tobacco. She reports that she does not drink alcohol or use drugs.    Medications:   Prior to Admission medications    Medication Sig Start Date End Date Taking? Authorizing Provider   albuterol (PROVENTIL HFA;VENTOLIN HFA) 108 (90 BASE) MCG/ACT inhaler Inhale 2 puffs Every 4 (Four) Hours As Needed for wheezing. 2/17/17  Yes Tiffany Jimenez MD   DEXILANT 60 MG capsule  1/9/19  Yes Zoey Cruz MD   hydrochlorothiazide (HYDRODIURIL) 12.5 MG tablet Take 12.5 mg by mouth Daily. 3/16/18  Yes Zoey Cruz MD   HYDROcodone-acetaminophen (NORCO)  MG per tablet TK 1 T PO TID PRN. 5/7/18  Yes Zoey Cruz MD   hydrOXYzine (VISTARIL) 25 MG capsule Take 25 mg by mouth every night at bedtime. 3/14/18  Yes Zoey Cruz MD   mirtazapine (REMERON) 15 MG tablet 30 mg Every Night. 11/16/17  Yes Zoey Cruz MD   sucralfate (CARAFATE) 1 g tablet Take 1 tablet by mouth 4 (Four) Times a Day. 8/2/18  Yes Babar Connell MD   mirtazapine (REMERON) 30 MG tablet TK 1 T PO QHS 10/31/18   Zoey Cruz MD   amLODIPine (NORVASC) 10 MG tablet Take 10 mg by mouth Daily. 11/13/17 1/10/19  Zoey Cruz MD       Allergies:  Penicillins and Strawberry c [ascorbate]    ROS:    Review of Systems   Constitutional: Negative for activity change, appetite change, chills, diaphoresis, fatigue, fever and unexpected weight change.   HENT: Positive for trouble swallowing. Negative for sore throat.    Respiratory: Negative for shortness of breath.    Gastrointestinal: Negative for abdominal distention, abdominal pain, anal bleeding, blood in stool, constipation, diarrhea, nausea, rectal pain and vomiting.   Endocrine: Negative for polydipsia, polyphagia and polyuria.   Genitourinary: Negative for  "difficulty urinating.   Musculoskeletal: Negative for arthralgias.   Skin: Negative for pallor.   Allergic/Immunologic: Negative for food allergies.   Neurological: Negative for weakness and light-headedness.   Psychiatric/Behavioral: Negative for behavioral problems.     Objective     Blood pressure 124/70, pulse 73, height 160 cm (63\"), weight 59.4 kg (131 lb), last menstrual period 03/17/1989, not currently breastfeeding.    Physical Exam   Constitutional: She is oriented to person, place, and time. She appears well-developed and well-nourished. No distress.   HENT:   Head: Normocephalic and atraumatic.   Cardiovascular: Normal rate, regular rhythm, normal heart sounds and intact distal pulses. Exam reveals no gallop and no friction rub.   No murmur heard.  Pulmonary/Chest: Breath sounds normal. No respiratory distress. She has no wheezes. She has no rales. She exhibits no tenderness.   Abdominal: Soft. Bowel sounds are normal. She exhibits no distension and no mass. There is no tenderness. There is no rebound and no guarding. No hernia.   Musculoskeletal: Normal range of motion. She exhibits no edema.   Neurological: She is alert and oriented to person, place, and time.   Skin: Skin is warm and dry. No rash noted. She is not diaphoretic. No erythema. No pallor.   Psychiatric: She has a normal mood and affect. Her behavior is normal. Judgment and thought content normal.        Assessment/Plan   Maddison was seen today for early satiety and heartburn.    Diagnoses and all orders for this visit:    Gastroesophageal reflux disease without esophagitis    Stricture esophagus    Other dysphagia        * Surgery not found *     Diagnosis Plan   1. Gastroesophageal reflux disease without esophagitis     2. Stricture esophagus     3. Other dysphagia         Anticipated Surgical Procedure:  No orders of the defined types were placed in this encounter.      The risks, benefits, and alternatives of this procedure have been " discussed with the patient or the responsible party- the patient understands and agrees to proceed.

## 2019-06-25 ENCOUNTER — HOSPITAL ENCOUNTER (OUTPATIENT)
Dept: CT IMAGING | Facility: HOSPITAL | Age: 59
Discharge: HOME | End: 2019-06-25
Admitting: NURSE PRACTITIONER

## 2019-06-25 PROCEDURE — 71260 CT THORAX DX C+: CPT

## 2019-06-25 PROCEDURE — 25010000002 IOPAMIDOL 61 % SOLUTION: Performed by: NURSE PRACTITIONER

## 2019-06-25 RX ADMIN — IOPAMIDOL 80 ML: 612 INJECTION, SOLUTION INTRAVENOUS at 12:04

## 2019-06-27 ENCOUNTER — LAB (OUTPATIENT)
Dept: ONCOLOGY | Facility: HOSPITAL | Age: 59
End: 2019-06-27

## 2019-06-27 ENCOUNTER — OFFICE VISIT (OUTPATIENT)
Dept: ONCOLOGY | Facility: CLINIC | Age: 59
End: 2019-06-27

## 2019-06-27 VITALS
WEIGHT: 140 LBS | HEART RATE: 71 BPM | TEMPERATURE: 98.5 F | SYSTOLIC BLOOD PRESSURE: 119 MMHG | BODY MASS INDEX: 24.8 KG/M2 | HEIGHT: 63 IN | RESPIRATION RATE: 18 BRPM | DIASTOLIC BLOOD PRESSURE: 76 MMHG

## 2019-06-27 DIAGNOSIS — C15.4 MALIGNANT NEOPLASM OF THORACIC ESOPHAGUS (HCC): Primary | ICD-10-CM

## 2019-06-27 DIAGNOSIS — C15.4 MALIGNANT NEOPLASM OF THORACIC ESOPHAGUS (HCC): ICD-10-CM

## 2019-06-27 LAB
ALBUMIN SERPL-MCNC: 4 G/DL (ref 3.5–5.2)
ALBUMIN/GLOB SERPL: 1.2 G/DL
ALP SERPL-CCNC: 105 U/L (ref 39–117)
ALT SERPL W P-5'-P-CCNC: 9 U/L (ref 1–33)
ANION GAP SERPL CALCULATED.3IONS-SCNC: 9 MMOL/L (ref 5–15)
AST SERPL-CCNC: 13 U/L (ref 1–32)
BASOPHILS # BLD AUTO: 0.03 10*3/MM3 (ref 0–0.2)
BASOPHILS NFR BLD AUTO: 0.5 % (ref 0–1.5)
BILIRUB SERPL-MCNC: 0.4 MG/DL (ref 0.2–1.2)
BUN BLD-MCNC: 14 MG/DL (ref 8–23)
BUN/CREAT SERPL: 17.1 (ref 7–25)
CALCIUM SPEC-SCNC: 8.9 MG/DL (ref 8.6–10.5)
CHLORIDE SERPL-SCNC: 102 MMOL/L (ref 98–107)
CO2 SERPL-SCNC: 29 MMOL/L (ref 22–29)
CREAT BLD-MCNC: 0.82 MG/DL (ref 0.57–1)
DEPRECATED RDW RBC AUTO: 47.8 FL (ref 37–54)
EOSINOPHIL # BLD AUTO: 0.12 10*3/MM3 (ref 0–0.4)
EOSINOPHIL NFR BLD AUTO: 2 % (ref 0.3–6.2)
ERYTHROCYTE [DISTWIDTH] IN BLOOD BY AUTOMATED COUNT: 14.4 % (ref 12.3–15.4)
GFR SERPL CREATININE-BSD FRML MDRD: 86 ML/MIN/1.73
GLOBULIN UR ELPH-MCNC: 3.3 GM/DL
GLUCOSE BLD-MCNC: 126 MG/DL (ref 65–99)
HCT VFR BLD AUTO: 37.7 % (ref 34–46.6)
HGB BLD-MCNC: 12.3 G/DL (ref 12–15.9)
IMM GRANULOCYTES # BLD AUTO: 0.01 10*3/MM3 (ref 0–0.05)
IMM GRANULOCYTES NFR BLD AUTO: 0.2 % (ref 0–0.5)
LYMPHOCYTES # BLD AUTO: 1.26 10*3/MM3 (ref 0.7–3.1)
LYMPHOCYTES NFR BLD AUTO: 20.9 % (ref 19.6–45.3)
MCH RBC QN AUTO: 29.5 PG (ref 26.6–33)
MCHC RBC AUTO-ENTMCNC: 32.6 G/DL (ref 31.5–35.7)
MCV RBC AUTO: 90.4 FL (ref 79–97)
MONOCYTES # BLD AUTO: 0.7 10*3/MM3 (ref 0.1–0.9)
MONOCYTES NFR BLD AUTO: 11.6 % (ref 5–12)
NEUTROPHILS # BLD AUTO: 3.92 10*3/MM3 (ref 1.7–7)
NEUTROPHILS NFR BLD AUTO: 64.8 % (ref 42.7–76)
NRBC BLD AUTO-RTO: 0 /100 WBC (ref 0–0.2)
PLATELET # BLD AUTO: 248 10*3/MM3 (ref 140–450)
PMV BLD AUTO: 11.3 FL (ref 6–12)
POTASSIUM BLD-SCNC: 3.5 MMOL/L (ref 3.5–5.2)
PROT SERPL-MCNC: 7.3 G/DL (ref 6–8.5)
RBC # BLD AUTO: 4.17 10*6/MM3 (ref 3.77–5.28)
SODIUM BLD-SCNC: 140 MMOL/L (ref 136–145)
WBC NRBC COR # BLD: 6.04 10*3/MM3 (ref 3.4–10.8)

## 2019-06-27 PROCEDURE — 1123F ACP DISCUSS/DSCN MKR DOCD: CPT | Performed by: INTERNAL MEDICINE

## 2019-06-27 PROCEDURE — 99213 OFFICE O/P EST LOW 20 MIN: CPT | Performed by: INTERNAL MEDICINE

## 2019-06-27 PROCEDURE — 36415 COLL VENOUS BLD VENIPUNCTURE: CPT | Performed by: INTERNAL MEDICINE

## 2019-06-27 PROCEDURE — 85025 COMPLETE CBC W/AUTO DIFF WBC: CPT | Performed by: NURSE PRACTITIONER

## 2019-06-27 PROCEDURE — 80053 COMPREHEN METABOLIC PANEL: CPT | Performed by: NURSE PRACTITIONER

## 2019-06-27 PROCEDURE — G9903 PT SCRN TBCO ID AS NON USER: HCPCS | Performed by: INTERNAL MEDICINE

## 2019-06-27 NOTE — PROGRESS NOTES
DATE OF VISIT: 2019      REASON FOR VISIT: Esophageal cancer on surveillance      HISTORY OF PRESENT ILLNESS:    59-year-old female with medical problem consisting of esophageal cancer which was moderately differentiated squamous cell and middle esophagus diagnosed in 2015, status post surgery in 2016, anxiety, hypertension is here for follow-up appointment today.  Patient had a surveillance CT scan of chest done recently.  She is here to discuss the results and further recommendation.  Denies any new lymph node enlargement.  Denies any bleeding.  Denies any recent weight loss.      PAST MEDICAL HISTORY:    Past Medical History:   Diagnosis Date   • Abnormal weight loss    • Anxiety    • Atrophic vaginitis    • Depression    • Dysphagia     pharyngoesophageal phase      • Encounter for surgical aftercare following surgery of digestive system     Walter Michael (thoracic esophagectomy, lymphadenectomly, Bronchoscopy, Placement on Q. 3/1/16      • History of esophagogastroduodenoscopy     Normal hypopharynx.A tumor was found in the middle third of the esophagus.Mul.biopsies taken.Normal GE junction.Mild nonerosive gastritis in antrum.Biopsies taken.Normal pylorus and duodenum. 2015       • History of hysteroscopy     Hysteroscopy, dilation and curettage, and repair of vaginal laceration. 2013       • History of substance abuse    • Hypertension    • Lumbosacral strain    • Myopia    • Presbyopia    • Primary malignant neoplasm of thoracic part of esophagus (CMS/HCC)     Squamous cell   • Tobacco dependence     1 ppd       SOCIAL HISTORY:    Social History     Tobacco Use   • Smoking status: Former Smoker     Types: Cigarettes     Last attempt to quit: 2015     Years since quittin.4   • Smokeless tobacco: Never Used   • Tobacco comment: 2018 - Patient reports she utilized approximately 12 Cigarettes per day.  Patient reports she utilized tobacco productss approximately 25 - 30  years.   Substance Use Topics   • Alcohol use: No   • Drug use: No       Surgical History :  Past Surgical History:   Procedure Laterality Date   • ABDOMINAL SURGERY  03/01/2016    Abdominal portion of an Clayton Michael esophagogastrectomy with jejunostomy tube placement. Esophageal carcinoma.   • COLONOSCOPY N/A 12/18/2017    Procedure: COLONOSCOPY;  Surgeon: Babar Connell MD;  Location: [facility] ENDOSCOPY;  Service:    • ENDOSCOPY N/A 2/3/2017    Procedure: ESOPHAGOGASTRODUODENOSCOPY;  Surgeon: Babar Connell MD;  Location: [facility] ENDOSCOPY;  Service:    • ENDOSCOPY N/A 5/22/2017    Procedure: ESOPHAGOGASTRODUODENOSCOPY;  Surgeon: Babar Connell MD;  Location: [facility] ENDOSCOPY;  Service:    • ENDOSCOPY N/A 9/22/2017    Procedure: ESOPHAGOGASTRODUODENOSCOPY;  Surgeon: Babar Connell MD;  Location: [facility] ENDOSCOPY;  Service:    • ENDOSCOPY N/A 12/18/2017    Procedure: ESOPHAGOGASTRODUODENOSCOPY;  Surgeon: Babar Connell MD;  Location: [facility] ENDOSCOPY;  Service:    • ENDOSCOPY N/A 6/14/2018    Procedure: ESOPHAGOGASTRODUODENOSCOPY possible dilation;  Surgeon: Babar Connell MD;  Location: [facility] ENDOSCOPY;  Service: Gastroenterology   • ENDOSCOPY N/A 12/14/2018    Procedure: ESOPHAGOGASTRODUODENOSCOPY;  Surgeon: Babar Connell MD;  Location: [facility] ENDOSCOPY;  Service: Gastroenterology   • ESOPHAGUS SURGERY      Thoracic esophagectomy (Clayton Michael type).Thoracic lymphadenectomy.Fiberoptic bronchoscopy.Placement of On Q pain pump.Jejunostomy tube placement.Pyloromyotomy. 03/01/2016      • LUMBAR DISC SURGERY     • UPPER GASTROINTESTINAL ENDOSCOPY  02/03/2017   • UPPER GASTROINTESTINAL ENDOSCOPY  05/22/2017   • UPPER GASTROINTESTINAL ENDOSCOPY  09/22/2017   • UPPER GASTROINTESTINAL ENDOSCOPY  12/18/2017   • UPPER GASTROINTESTINAL ENDOSCOPY  06/14/2018   • UPPER GASTROINTESTINAL ENDOSCOPY  12/14/2018       ALLERGIES:    Allergies   Allergen Reactions   • Penicillins Hives   • Strawberry C [Ascorbate] Hives  "        FAMILY HISTORY:  Family History   Problem Relation Age of Onset   • Ovarian cancer Mother    • Cancer Mother    • Osteoporosis Other    • Heart disease Other    • Diabetes Other    • Cancer Other         Colorectal-Parent   • Asthma Other    • Asthma Sister    • Diabetes Sister    • Hypertension Sister    • Alcohol abuse Brother    • Cancer Maternal Uncle            REVIEW OF SYSTEMS:      CONSTITUTIONAL:  Complains of fatigue.  Has gained 10 pounds since last clinic visit.  Denies any fever, chills .     EYES: No visual disturbances. No discharge. No new lesions    ENMT: Complains of chronic difficulty swallowing.  No epistaxis, mouth sores.    RESPIRATORY:  No new shortness of breath. No new cough or hemoptysis.    CARDIOVASCULAR:  No chest pain or palpitations.    GASTROINTESTINAL: Complains of intermittent nausea without vomiting.  No abdominal pain or blood in the stool.    GENITOURINARY: No Dysuria or Hematuria.    MUSCULOSKELETAL:  No new back pain or arthralgia..    LYMPHATICS:  Denies any abnormal swollen glands anywhere in the body.    NEUROLOGICAL : No tingling or numbness. No headache or dizziness. No seizures or balance problems.    SKIN: No new skin lesions.    ENDOCRINE : No new heat or cold intolerance. No new polyuria . No polydipsia.        PHYSICAL EXAMINATION:      VITAL SIGNS:  /76   Pulse 71   Temp 98.5 °F (36.9 °C) (Temporal)   Resp 18   Ht 160 cm (63\")   Wt 63.5 kg (140 lb)   LMP 03/17/1989 (Within Months) Comment: Postmenopausal  BMI 24.80 kg/m²       06/27/19  1113   Weight: 63.5 kg (140 lb)         ECOG performance status: 2    CONSTITUTIONAL:  Not in any distress.    EYES: Mild conjunctival Pallor. No Icterus. No Pterygium. Extraocular Movements intact.No ptosis.    ENMT:  Normocephalic, Atraumatic.No Facial Asymmetry noted.    NECK:  No adenopathy.Trachea midline. NO JVD.    RESPIRATORY:  Fair air entry bilateral. No rhonchi or wheezing.Fair respiratory " effort.    CARDIOVASCULAR:  S1, S2. Regular rate and rhythm. No murmur or gallop appreciated.    ABDOMEN:  Soft, obese, nontender. Bowel sounds present in all four quadrants.  No Hepatosplenomegaly appreciated.    MUSCULOSKELETAL:  No edema.No Calf Tenderness.Decreased range of motion.    NEUROLOGIC:    No  Motor or sensory deficit appreciated. Cranial Nerves 2-12 grossly intact.    SKIN : No new skin lesion identified. Skin is warm and dry to touch.    LYMPHATICS: No new enlarged lymph nodes in neck or supraclavicular area.    PSYCHIATRY: Alert, awake and oriented ×3.        DIAGNOSTIC DATA:    Glucose   Date Value Ref Range Status   06/27/2019 126 (H) 65 - 99 mg/dL Final     Sodium   Date Value Ref Range Status   06/27/2019 140 136 - 145 mmol/L Final     Potassium   Date Value Ref Range Status   06/27/2019 3.5 3.5 - 5.2 mmol/L Final     CO2   Date Value Ref Range Status   06/27/2019 29.0 22.0 - 29.0 mmol/L Final     Chloride   Date Value Ref Range Status   06/27/2019 102 98 - 107 mmol/L Final     Anion Gap   Date Value Ref Range Status   06/27/2019 9.0 5.0 - 15.0 mmol/L Final     Creatinine   Date Value Ref Range Status   06/27/2019 0.82 0.57 - 1.00 mg/dL Final     BUN   Date Value Ref Range Status   06/27/2019 14 8 - 23 mg/dL Final     BUN/Creatinine Ratio   Date Value Ref Range Status   06/27/2019 17.1 7.0 - 25.0 Final     Calcium   Date Value Ref Range Status   06/27/2019 8.9 8.6 - 10.5 mg/dL Final     Alkaline Phosphatase   Date Value Ref Range Status   06/27/2019 105 39 - 117 U/L Final     Total Protein   Date Value Ref Range Status   06/27/2019 7.3 6.0 - 8.5 g/dL Final     ALT (SGPT)   Date Value Ref Range Status   06/27/2019 9 1 - 33 U/L Final     AST (SGOT)   Date Value Ref Range Status   06/27/2019 13 1 - 32 U/L Final     Total Bilirubin   Date Value Ref Range Status   06/27/2019 0.4 0.2 - 1.2 mg/dL Final     Albumin   Date Value Ref Range Status   06/27/2019 4.00 3.50 - 5.20 g/dL Final     Globulin    Date Value Ref Range Status   06/27/2019 3.3 gm/dL Final     Lab Results   Component Value Date    WBC 6.04 06/27/2019    HGB 12.3 06/27/2019    HCT 37.7 06/27/2019    MCV 90.4 06/27/2019     06/27/2019     Lab Results   Component Value Date    NEUTROABS 3.92 06/27/2019     No results found for: , LABCA2, AFPTM, HCGQUANT, , CHROMGRNA, [id number], CEA, REFLABREPO        PATHOLOGY:  Pathology report from September 24, 2015 showed:  FINAL DIAGNOSIS:  A.  GASTRIC ANTRUM, MUCOSAL BIOPSY:           CHRONIC GASTRITIS.           POSITIVE FOR HELICOBACTER PYLORI.  B.  ESOPHAGEAL MASS BIOPSY:           SQUAMOUS CELL CARCINOMA, MODERATELY DIFFERENTIATED.        Pathology report from March 1, 2016 showed:  FINAL DIAGNOSIS:  A.    SEGMENT OF ESOPHAGUS AND PROXIMAL STOMACH:  STATUS POST ESOPHAGEAL MASS BIOPSY WITH A DIAGNOSIS OF       SQUAMOUS CELL CARCINOMA ([id number]) WITH SUBSEQUENT       CHEMO AND RADIATION THERAPY.  COMPLETE ABSENCE OF RESIDUAL TUMOR (EPITHELIAL STAINS       i.e.:  AE1/AE3 AND GORDY BOTH NEGATIVE).  RESECTION MARGINS FREE OF TUMOR.  B.    LEVEL 7 LYMPH NODES (2):  NO EVIDENCE OF MALIGNANCY.  C.    LEVEL 9 LYMPH NODES (2):  NO EVIDENCE OF MALIGNANCY.          RADIOLOGY DATA :  CT of chest with contrast done on June 25, 2019 was reviewed, discussed with patient, it showed:  PULMONARY PARENCHYMA:            - air spaces:      negative            - interstitium:     grossly within normal limits for age              - misc.:      No newly appearing pulmonary nodules.           MEDIASTINUM / YONG:           - heart:      normal size , no pericardial fluid           - aorta/great vessels:    normal caliber and configuration  for age          - misc.:      Status post gastric pull-through, unchanged  configuration since the prior study.          PLEURAL COMPARTMENT:            - misc.:      no pleural fluid or mass          MISC:          - inferior neck:     negative          - osseous/body  wall:  negative          - subdiaphragmatic:    as visualized, limited, grossly  unremarkable          - misc:  .     IMPRESSION:  CONCLUSION:          1. Stable examination.                 ASSESSMENT AND PLAN:      1.  Localized squamous cancer of mid esophagus:  -Patient was initially diagnosed with squamous cell cancer of middle of esophagus in September 2015.  -Was initially treated with carboplatin and Taxol with radiation that completed in January 2016.  -Patient had esophagectomy done on March 1, 2016 that showed complete pathological response.  Patient has been on observation since then.  -EGD done in December 2018 by Dr. Connell does not show any evidence of recurrence.  -Recent CT of chest with contrast done in June 2019 is negative for recurrence as well.  Results of CT scan were discussed with the patient.  -Since patient is 3 years out of her cancer diagnosis, no need to do any more surveillance CT scan which was discussed with the patient.  -We will ask patient to return to clinic in 6 months with repeat CBC and CMP to be done on that day.    2.  Hypertension    3.  Health maintenance: Patient does not smoke.  Not due for screening colonoscopy at this point.    4.Advance Care Planning: For now patient remains full code and is able to make her decisions.  Patient has health care surrogate mentioned on chart.    5. Pain assessment:  -Patient denies any pain today.         Camilo Coleman MD  6/27/2019  11:40 AM        EMR Dragon/Transcription disclaimer:   Much of this encounter note is an electronic transcription/translation of spoken language to printed text. The electronic translation of spoken language may permit erroneous, or at times, nonsensical words or phrases to be inadvertently transcribed; Although I have reviewed the note for such errors, some may still exist.

## 2019-08-22 ENCOUNTER — OFFICE VISIT (OUTPATIENT)
Dept: GASTROENTEROLOGY | Facility: CLINIC | Age: 59
End: 2019-08-22

## 2019-08-22 ENCOUNTER — HOSPITAL ENCOUNTER (OUTPATIENT)
Facility: HOSPITAL | Age: 59
Setting detail: HOSPITAL OUTPATIENT SURGERY
End: 2019-08-22
Attending: INTERNAL MEDICINE | Admitting: INTERNAL MEDICINE

## 2019-08-22 VITALS
SYSTOLIC BLOOD PRESSURE: 126 MMHG | BODY MASS INDEX: 24.41 KG/M2 | WEIGHT: 137.8 LBS | DIASTOLIC BLOOD PRESSURE: 70 MMHG | HEART RATE: 66 BPM | HEIGHT: 63 IN

## 2019-08-22 DIAGNOSIS — K21.00 GASTROESOPHAGEAL REFLUX DISEASE WITH ESOPHAGITIS: Primary | ICD-10-CM

## 2019-08-22 DIAGNOSIS — R13.19 OTHER DYSPHAGIA: ICD-10-CM

## 2019-08-22 PROCEDURE — 99213 OFFICE O/P EST LOW 20 MIN: CPT | Performed by: INTERNAL MEDICINE

## 2019-08-22 RX ORDER — MIRTAZAPINE 30 MG/1
30 TABLET, FILM COATED ORAL NIGHTLY
COMMUNITY
End: 2019-12-12

## 2019-08-22 RX ORDER — AMLODIPINE BESYLATE 10 MG/1
10 TABLET ORAL DAILY
COMMUNITY
End: 2020-02-25

## 2019-08-22 RX ORDER — DEXLANSOPRAZOLE 60 MG/1
60 CAPSULE, DELAYED RELEASE ORAL DAILY
Qty: 30 CAPSULE | Refills: 5 | Status: SHIPPED | OUTPATIENT
Start: 2019-08-22 | End: 2019-12-12

## 2019-08-22 RX ORDER — SUCRALFATE 1 G/1
1 TABLET ORAL 4 TIMES DAILY
Qty: 120 TABLET | Refills: 5 | Status: SHIPPED | OUTPATIENT
Start: 2019-08-22 | End: 2019-12-12

## 2019-08-22 RX ORDER — DEXTROSE AND SODIUM CHLORIDE 5; .45 G/100ML; G/100ML
30 INJECTION, SOLUTION INTRAVENOUS CONTINUOUS PRN
Status: CANCELLED | OUTPATIENT
Start: 2019-08-22

## 2019-08-22 NOTE — PROGRESS NOTES
[facility]      Chief Complaint:   Chief Complaint   Patient presents with   • Difficulty Swallowing   • Heartburn       Subjective     HPI:   She with history of esophageal cancer.  Patient had this resected in has gastric pull-up.  Patient had EGD done in December which shows large amount of food in the stomach but no lesions seen at the distal esophagus.  Patient will need a repeat EGD in December.  Discussed with patient not eating for at least 24 hours prior to the procedure.    Plan; we will schedule patient for EGD in December    Past Medical History:   Past Medical History:   Diagnosis Date   • Abnormal weight loss    • Anxiety    • Atrophic vaginitis    • Depression    • Dysphagia     pharyngoesophageal phase      • Encounter for surgical aftercare following surgery of digestive system     [place] Michael (thoracic esophagectomy, lymphadenectomly, Bronchoscopy, Placement on Q. 3/1/16      • History of esophagogastroduodenoscopy     Normal hypopharynx.A tumor was found in the middle third of the esophagus.Mul.biopsies taken.Normal GE junction.Mild nonerosive gastritis in antrum.Biopsies taken.Normal pylorus and duodenum. 09/24/2015       • History of hysteroscopy     Hysteroscopy, dilation and curettage, and repair of vaginal laceration. 08/03/2013       • History of substance abuse    • Hypertension    • Lumbosacral strain    • Myopia    • Presbyopia    • Primary malignant neoplasm of thoracic part of esophagus (CMS/HCC)     Squamous cell   • Tobacco dependence     1 ppd       Past Surgical History:  Past Surgical History:   Procedure Laterality Date   • ABDOMINAL SURGERY  03/01/2016    Abdominal portion of an Walter Michael esophagogastrectomy with jejunostomy tube placement. Esophageal carcinoma.   • COLONOSCOPY N/A 12/18/2017    Procedure: COLONOSCOPY;  Surgeon: Babar Connell MD;  Location: [facility] ENDOSCOPY;  Service:    • ENDOSCOPY N/A 2/3/2017    Procedure: ESOPHAGOGASTRODUODENOSCOPY;   Surgeon: Babar Connell MD;  Location: [facility] ENDOSCOPY;  Service:    • ENDOSCOPY N/A 5/22/2017    Procedure: ESOPHAGOGASTRODUODENOSCOPY;  Surgeon: Babar Connell MD;  Location: [facility] ENDOSCOPY;  Service:    • ENDOSCOPY N/A 9/22/2017    Procedure: ESOPHAGOGASTRODUODENOSCOPY;  Surgeon: Babar Connell MD;  Location: [facility] ENDOSCOPY;  Service:    • ENDOSCOPY N/A 12/18/2017    Procedure: ESOPHAGOGASTRODUODENOSCOPY;  Surgeon: Babar Connell MD;  Location: [facility] ENDOSCOPY;  Service:    • ENDOSCOPY N/A 6/14/2018    Procedure: ESOPHAGOGASTRODUODENOSCOPY possible dilation;  Surgeon: Babar Connell MD;  Location: [facility] ENDOSCOPY;  Service: Gastroenterology   • ENDOSCOPY N/A 12/14/2018    Procedure: ESOPHAGOGASTRODUODENOSCOPY;  Surgeon: Babar Connell MD;  Location: [facility] ENDOSCOPY;  Service: Gastroenterology   • ESOPHAGUS SURGERY      Thoracic esophagectomy (Walter Michael type).Thoracic lymphadenectomy.Fiberoptic bronchoscopy.Placement of On Q pain pump.Jejunostomy tube placement.Pyloromyotomy. 03/01/2016      • LUMBAR DISC SURGERY     • UPPER GASTROINTESTINAL ENDOSCOPY  02/03/2017   • UPPER GASTROINTESTINAL ENDOSCOPY  05/22/2017   • UPPER GASTROINTESTINAL ENDOSCOPY  09/22/2017   • UPPER GASTROINTESTINAL ENDOSCOPY  12/18/2017   • UPPER GASTROINTESTINAL ENDOSCOPY  06/14/2018   • UPPER GASTROINTESTINAL ENDOSCOPY  12/14/2018       Family History:  Family History   Problem Relation Age of Onset   • Ovarian cancer Mother    • Cancer Mother    • Osteoporosis Other    • Heart disease Other    • Diabetes Other    • Cancer Other         Colorectal-Parent   • Asthma Other    • Asthma Sister    • Diabetes Sister    • Hypertension Sister    • Alcohol abuse Brother    • Cancer Maternal Uncle        Social History:   reports that she quit smoking about 4 years ago. Her smoking use included cigarettes. She has never used smokeless tobacco. She reports that she does not drink alcohol or use drugs.    Medications:   Prior to  Admission medications    Medication Sig Start Date End Date Taking? Authorizing Provider   albuterol (PROVENTIL HFA;VENTOLIN HFA) 108 (90 BASE) MCG/ACT inhaler Inhale 2 puffs Every 4 (Four) Hours As Needed for wheezing. 2/17/17  Yes Tiffany Jimenez MD   amLODIPine (NORVASC) 10 MG tablet Take 10 mg by mouth Daily.   Yes Zoey Cruz MD   DEXILANT 60 MG capsule Take 60 mg by mouth As Needed. 1/9/19  Yes Zoey Cruz MD   hydrochlorothiazide (HYDRODIURIL) 12.5 MG tablet Take 12.5 mg by mouth Daily. 3/16/18  Yes Zoey Cruz MD   mirtazapine (REMERON) 30 MG tablet TK 1 T PO QHS 10/31/18  Yes Zoey Cruz MD   mirtazapine (REMERON) 30 MG tablet Take 30 mg by mouth Every Night.   Yes Zoey Cruz MD   sucralfate (CARAFATE) 1 g tablet Take 1 tablet by mouth 4 (Four) Times a Day. 8/2/18  Yes Babar Connell MD   HYDROcodone-acetaminophen (NORCO)  MG per tablet TK 1 T PO TID PRN. 5/7/18 8/22/19 Yes Zoey Cruz MD   hydrOXYzine (VISTARIL) 25 MG capsule Take 25 mg by mouth every night at bedtime. 3/14/18 8/22/19 Yes Zoey Cruz MD   mirtazapine (REMERON) 15 MG tablet 30 mg Every Night. 11/16/17 8/22/19 Yes ProviderZoey MD       Allergies:  Penicillins and Strawberry c [ascorbate]    ROS:    Review of Systems   Constitutional: Negative for activity change, appetite change, chills, diaphoresis, fatigue, fever and unexpected weight change.   HENT: Negative for sore throat and trouble swallowing.    Respiratory: Negative for shortness of breath.    Gastrointestinal: Negative for abdominal distention, abdominal pain, anal bleeding, blood in stool, constipation, diarrhea, nausea, rectal pain and vomiting.   Endocrine: Negative for polydipsia, polyphagia and polyuria.   Genitourinary: Negative for difficulty urinating.   Musculoskeletal: Negative for arthralgias.   Skin: Negative for pallor.   Allergic/Immunologic: Negative for food allergies.  "  Neurological: Negative for weakness and light-headedness.   Psychiatric/Behavioral: Negative for behavioral problems.     Objective     Blood pressure 126/70, pulse 66, height 160 cm (63\"), weight 62.5 kg (137 lb 12.8 oz), last menstrual period 03/17/1989, not currently breastfeeding.    Physical Exam   Constitutional: She is oriented to person, place, and time. She appears well-developed and well-nourished. No distress.   HENT:   Head: Normocephalic and atraumatic.   Cardiovascular: Normal rate, regular rhythm, normal heart sounds and intact distal pulses. Exam reveals no gallop and no friction rub.   No murmur heard.  Pulmonary/Chest: Breath sounds normal. No respiratory distress. She has no wheezes. She has no rales. She exhibits no tenderness.   Abdominal: Soft. Bowel sounds are normal. She exhibits no distension and no mass. There is no tenderness. There is no rebound and no guarding. No hernia.   Musculoskeletal: Normal range of motion. She exhibits no edema.   Neurological: She is alert and oriented to person, place, and time.   Skin: Skin is warm and dry. No rash noted. She is not diaphoretic. No erythema. No pallor.   Psychiatric: She has a normal mood and affect. Her behavior is normal. Judgment and thought content normal.        Assessment/Plan   Maddison was seen today for difficulty swallowing and heartburn.    Diagnoses and all orders for this visit:    Gastroesophageal reflux disease with esophagitis  -     Case Request; Standing  -     dextrose 5 % and sodium chloride 0.45 % infusion  -     Case Request    Other orders  -     Follow Anesthesia Guidelines / Standing Orders; Future  -     Obtain Informed Consent; Future  -     Implement Anesthesia Orders Day of Procedure; Standing  -     Obtain Informed Consent; Standing  -     POC Glucose Once; Standing  -     Insert Peripheral IV; Standing        ESOPHAGOGASTRODUODENOSCOPY (N/A)     Diagnosis Plan   1. Gastroesophageal reflux disease with esophagitis "  Case Request    dextrose 5 % and sodium chloride 0.45 % infusion    Case Request       Anticipated Surgical Procedure:  Orders Placed This Encounter   Procedures   • Follow Anesthesia Guidelines / Standing Orders     Standing Status:   Future   • Obtain Informed Consent     Standing Status:   Future     Order Specific Question:   Informed Consent Given For     Answer:   ESOPHAGOGASTRODUODENOSCOPY       The risks, benefits, and alternatives of this procedure have been discussed with the patient or the responsible party- the patient understands and agrees to proceed.

## 2019-08-22 NOTE — PATIENT INSTRUCTIONS

## 2019-12-05 ENCOUNTER — TELEPHONE (OUTPATIENT)
Dept: GASTROENTEROLOGY | Facility: CLINIC | Age: 59
End: 2019-12-05

## 2019-12-05 NOTE — TELEPHONE ENCOUNTER
----- Message from Gina Camarena MA sent at 12/5/2019 11:28 AM CST -----  Pt called complaining about Dr. Connell ordering a colonoscopy for the 9, so she cancelled her appointment yesterday - Which was an EGD, not a colonoscopy. I tried explaining this to her and she kept saying he needs to stretch her throat instead of doing a colonoscopy. I informed her that I would send you a message and you would call her back. She seemed very confused and almost lethargic.

## 2019-12-05 NOTE — TELEPHONE ENCOUNTER
12/05/2019, 1426 - Patient telephone call returned per this staff member [phone].  Patient stated she canceled Endoscopy procedure scheduled for Monday, December 9, 2019 with an arrival time of 9:15 A.M. as she is need of Esophageal Dilation and not Colonoscopy.  Patient made aware Dr. Babar Hernandez M.D. submitted order 08/22/2019 for EGD and not Colonoscopy.  Patient apologized stating she became confused and requested to reschedule procedure.  Patient made aware this staff member will reschedule procedure and contact patient via telephone with date/time.  Patient verbalized understanding.         12/05/2019, 1442 - Patient telephoned per this staff member [phone] with notification of rescheduled date/time of EGD - Monday December 16, 2019 at 3:00 P.M.  Patient given reminder as stated per Dr. Babar Hernandez M.D. clinical documentation 08/22/2019 for patient to not eat solid food, to consume liquids, X 24 hours prior to procedure.  Patient verbalized understanding.

## 2019-12-16 ENCOUNTER — ANESTHESIA EVENT (OUTPATIENT)
Dept: GASTROENTEROLOGY | Facility: HOSPITAL | Age: 59
End: 2019-12-16

## 2019-12-16 ENCOUNTER — ANESTHESIA (OUTPATIENT)
Dept: GASTROENTEROLOGY | Facility: HOSPITAL | Age: 59
End: 2019-12-16

## 2019-12-16 ENCOUNTER — HOSPITAL ENCOUNTER (OUTPATIENT)
Facility: HOSPITAL | Age: 59
Setting detail: HOSPITAL OUTPATIENT SURGERY
Discharge: HOME | End: 2019-12-16
Attending: INTERNAL MEDICINE | Admitting: INTERNAL MEDICINE

## 2019-12-16 VITALS
BODY MASS INDEX: 22.04 KG/M2 | TEMPERATURE: 97.9 F | HEART RATE: 89 BPM | SYSTOLIC BLOOD PRESSURE: 137 MMHG | OXYGEN SATURATION: 96 % | HEIGHT: 65 IN | DIASTOLIC BLOOD PRESSURE: 77 MMHG | WEIGHT: 132.28 LBS | RESPIRATION RATE: 20 BRPM

## 2019-12-16 PROCEDURE — 43248 EGD GUIDE WIRE INSERTION: CPT | Performed by: INTERNAL MEDICINE

## 2019-12-16 PROCEDURE — 25010000002 PROPOFOL 10 MG/ML EMULSION: Performed by: NURSE ANESTHETIST, CERTIFIED REGISTERED

## 2019-12-16 RX ORDER — LIDOCAINE HYDROCHLORIDE 20 MG/ML
INJECTION, SOLUTION INTRAVENOUS AS NEEDED
Status: DISCONTINUED | OUTPATIENT
Start: 2019-12-16 | End: 2019-12-16 | Stop reason: SURG

## 2019-12-16 RX ORDER — PROPOFOL 10 MG/ML
VIAL (ML) INTRAVENOUS AS NEEDED
Status: DISCONTINUED | OUTPATIENT
Start: 2019-12-16 | End: 2019-12-16 | Stop reason: SURG

## 2019-12-16 RX ORDER — DEXTROSE AND SODIUM CHLORIDE 5; .45 G/100ML; G/100ML
30 INJECTION, SOLUTION INTRAVENOUS CONTINUOUS
Status: DISCONTINUED | OUTPATIENT
Start: 2019-12-16 | End: 2019-12-16 | Stop reason: HOSPADM

## 2019-12-16 RX ADMIN — PROPOFOL 50 MG: 10 INJECTION, EMULSION INTRAVENOUS at 17:52

## 2019-12-16 RX ADMIN — DEXTROSE AND SODIUM CHLORIDE 30 ML/HR: 5; 450 INJECTION, SOLUTION INTRAVENOUS at 16:55

## 2019-12-16 RX ADMIN — PROPOFOL 40 MG: 10 INJECTION, EMULSION INTRAVENOUS at 17:51

## 2019-12-16 RX ADMIN — PROPOFOL 50 MG: 10 INJECTION, EMULSION INTRAVENOUS at 17:49

## 2019-12-16 RX ADMIN — LIDOCAINE HYDROCHLORIDE 50 MG: 20 INJECTION, SOLUTION INTRAVENOUS at 17:49

## 2019-12-16 NOTE — ANESTHESIA POSTPROCEDURE EVALUATION
Patient: Maddison Sheridan    Procedure Summary     Date:  12/16/19 Room / Location:  [facility] ENDOSCOPY 1 / [facility] ENDOSCOPY    Anesthesia Start:  1745 Anesthesia Stop:  1758    Procedure:  ESOPHAGOGASTRODUODENOSCOPY (N/A ) Diagnosis:       Gastroesophageal reflux disease with esophagitis      (Gastroesophageal reflux disease with esophagitis [K21.0])    Surgeon:  Babar Connell MD Provider:  Vivek Rodriguez CRNA    Anesthesia Type:  MAC ASA Status:  2          Anesthesia Type: MAC    Vitals  No vitals data found for the desired time range.          Post Anesthesia Care and Evaluation    Patient location during evaluation: bedside  Patient participation: complete - patient participated  Level of consciousness: sleepy but conscious  Pain score: 0  Pain management: adequate  Airway patency: patent  Anesthetic complications: No anesthetic complications  PONV Status: none  Cardiovascular status: acceptable and stable  Respiratory status: acceptable  Hydration status: stable

## 2019-12-16 NOTE — H&P
[facility]      Chief Complaint:   No chief complaint on file.      Subjective     HPI:   She with history of esophageal cancer.  Patient had this resected in has gastric pull-up.  Patient had EGD done in December which shows large amount of food in the stomach but no lesions seen at the distal esophagus.  Patient will need a repeat EGD in December.  Discussed with patient not eating for at least 24 hours prior to the procedure.    Plan; we will schedule patient for EGD in December    Past Medical History:   Past Medical History:   Diagnosis Date   • Abnormal weight loss    • Anxiety    • Atrophic vaginitis    • Depression    • Dysphagia     pharyngoesophageal phase      • Encounter for surgical aftercare following surgery of digestive system     [place] Michael (thoracic esophagectomy, lymphadenectomly, Bronchoscopy, Placement on Q. 3/1/16      • History of esophagogastroduodenoscopy     Normal hypopharynx.A tumor was found in the middle third of the esophagus.Mul.biopsies taken.Normal GE junction.Mild nonerosive gastritis in antrum.Biopsies taken.Normal pylorus and duodenum. 09/24/2015       • History of hysteroscopy     Hysteroscopy, dilation and curettage, and repair of vaginal laceration. 08/03/2013       • History of substance abuse (CMS/HCC)    • Hypertension    • Lumbosacral strain    • Myopia    • Presbyopia    • Primary malignant neoplasm of thoracic part of esophagus (CMS/HCC)     Squamous cell   • Tobacco dependence     1 ppd       Past Surgical History:    Past Surgical History:   Procedure Laterality Date   • ABDOMINAL SURGERY  03/01/2016    Abdominal portion of an [place] Michael esophagogastrectomy with jejunostomy tube placement. Esophageal carcinoma.   • COLONOSCOPY N/A 12/18/2017    Procedure: COLONOSCOPY;  Surgeon: Babar Connell MD;  Location: [facility] ENDOSCOPY;  Service:    • ENDOSCOPY N/A 2/3/2017    Procedure: ESOPHAGOGASTRODUODENOSCOPY;  Surgeon: Babar Connell MD;  Location:  [facility] ENDOSCOPY;  Service:    • ENDOSCOPY N/A 5/22/2017    Procedure: ESOPHAGOGASTRODUODENOSCOPY;  Surgeon: Babar Connell MD;  Location: [facility] ENDOSCOPY;  Service:    • ENDOSCOPY N/A 9/22/2017    Procedure: ESOPHAGOGASTRODUODENOSCOPY;  Surgeon: Babar Connell MD;  Location: [facility] ENDOSCOPY;  Service:    • ENDOSCOPY N/A 12/18/2017    Procedure: ESOPHAGOGASTRODUODENOSCOPY;  Surgeon: Babar Connell MD;  Location: [facility] ENDOSCOPY;  Service:    • ENDOSCOPY N/A 6/14/2018    Procedure: ESOPHAGOGASTRODUODENOSCOPY possible dilation;  Surgeon: Babar Connell MD;  Location: [facility] ENDOSCOPY;  Service: Gastroenterology   • ENDOSCOPY N/A 12/14/2018    Procedure: ESOPHAGOGASTRODUODENOSCOPY;  Surgeon: Babar Connell MD;  Location: [facility] ENDOSCOPY;  Service: Gastroenterology   • ESOPHAGUS SURGERY      Thoracic esophagectomy ([place] Michael type).Thoracic lymphadenectomy.Fiberoptic bronchoscopy.Placement of On Q pain pump.Jejunostomy tube placement.Pyloromyotomy. 03/01/2016      • LUMBAR DISC SURGERY     • UPPER GASTROINTESTINAL ENDOSCOPY  02/03/2017   • UPPER GASTROINTESTINAL ENDOSCOPY  05/22/2017   • UPPER GASTROINTESTINAL ENDOSCOPY  09/22/2017   • UPPER GASTROINTESTINAL ENDOSCOPY  12/18/2017   • UPPER GASTROINTESTINAL ENDOSCOPY  06/14/2018   • UPPER GASTROINTESTINAL ENDOSCOPY  12/14/2018       Family History:  Family History   Problem Relation Age of Onset   • Ovarian cancer Mother    • Cancer Mother    • Osteoporosis Other    • Heart disease Other    • Diabetes Other    • Cancer Other         Colorectal-Parent   • Asthma Other    • Asthma Sister    • Diabetes Sister    • Hypertension Sister    • Alcohol abuse Brother    • Cancer Maternal Uncle        Social History:   reports that she quit smoking about 4 years ago. Her smoking use included cigarettes. She has never used smokeless tobacco. She reports that she does not drink alcohol or use drugs.    Medications:   Prior to Admission medications    Medication Sig Start  Date End Date Taking? Authorizing Provider   albuterol (PROVENTIL HFA;VENTOLIN HFA) 108 (90 BASE) MCG/ACT inhaler Inhale 2 puffs Every 4 (Four) Hours As Needed for wheezing. 2/17/17  Yes Tiffany Jimenez MD   amLODIPine (NORVASC) 10 MG tablet Take 10 mg by mouth Daily.   Yes Zoey Cruz MD   DEXILANT 60 MG capsule Take 60 mg by mouth As Needed. 1/9/19  Yes Zoey Cruz MD   hydrochlorothiazide (HYDRODIURIL) 12.5 MG tablet Take 12.5 mg by mouth Daily. 3/16/18  Yes Zoey Cruz MD   mirtazapine (REMERON) 30 MG tablet TK 1 T PO QHS 10/31/18  Yes Zoey Cruz MD   mirtazapine (REMERON) 30 MG tablet Take 30 mg by mouth Every Night.   Yes Zoey Cruz MD   sucralfate (CARAFATE) 1 g tablet Take 1 tablet by mouth 4 (Four) Times a Day. 8/2/18  Yes Babar Connell MD   HYDROcodone-acetaminophen (NORCO)  MG per tablet TK 1 T PO TID PRN. 5/7/18 8/22/19 Yes Zoey Cruz MD   hydrOXYzine (VISTARIL) 25 MG capsule Take 25 mg by mouth every night at bedtime. 3/14/18 8/22/19 Yes Zoey Cruz MD   mirtazapine (REMERON) 15 MG tablet 30 mg Every Night. 11/16/17 8/22/19 Yes Zoey Cruz MD       Allergies:  Penicillins and Strawberry c [ascorbate]    ROS:    Review of Systems   Constitutional: Negative for activity change, appetite change, chills, diaphoresis, fatigue, fever and unexpected weight change.   HENT: Negative for sore throat and trouble swallowing.    Respiratory: Negative for shortness of breath.    Gastrointestinal: Negative for abdominal distention, abdominal pain, anal bleeding, blood in stool, constipation, diarrhea, nausea, rectal pain and vomiting.   Endocrine: Negative for polydipsia, polyphagia and polyuria.   Genitourinary: Negative for difficulty urinating.   Musculoskeletal: Negative for arthralgias.   Skin: Negative for pallor.   Allergic/Immunologic: Negative for food allergies.   Neurological: Negative for weakness and  "light-headedness.   Psychiatric/Behavioral: Negative for behavioral problems.     Objective     Height 165.1 cm (65\"), weight 70.8 kg (156 lb), last menstrual period 03/17/1989, not currently breastfeeding.    Physical Exam   Constitutional: She is oriented to person, place, and time. She appears well-developed and well-nourished. No distress.   HENT:   Head: Normocephalic and atraumatic.   Cardiovascular: Normal rate, regular rhythm, normal heart sounds and intact distal pulses. Exam reveals no gallop and no friction rub.   No murmur heard.  Pulmonary/Chest: Breath sounds normal. No respiratory distress. She has no wheezes. She has no rales. She exhibits no tenderness.   Abdominal: Soft. Bowel sounds are normal. She exhibits no distension and no mass. There is no tenderness. There is no rebound and no guarding. No hernia.   Musculoskeletal: Normal range of motion. She exhibits no edema.   Neurological: She is alert and oriented to person, place, and time.   Skin: Skin is warm and dry. No rash noted. She is not diaphoretic. No erythema. No pallor.   Psychiatric: She has a normal mood and affect. Her behavior is normal. Judgment and thought content normal.        Assessment/Plan   Maddison was seen today for difficulty swallowing and heartburn.    Diagnoses and all orders for this visit:    Gastroesophageal reflux disease with esophagitis  -     Case Request; Standing  -     dextrose 5 % and sodium chloride 0.45 % infusion  -     Case Request    Other orders  -     Follow Anesthesia Guidelines / Standing Orders; Future  -     Obtain Informed Consent; Future  -     Implement Anesthesia Orders Day of Procedure; Standing  -     Obtain Informed Consent; Standing  -     POC Glucose Once; Standing  -     Insert Peripheral IV; Standing        ESOPHAGOGASTRODUODENOSCOPY (N/A)    No diagnosis found.    Anticipated Surgical Procedure:  No orders of the defined types were placed in this encounter.      The risks, benefits, and " alternatives of this procedure have been discussed with the patient or the responsible party- the patient understands and agrees to proceed.

## 2019-12-19 ENCOUNTER — OFFICE VISIT (OUTPATIENT)
Dept: ONCOLOGY | Facility: CLINIC | Age: 59
End: 2019-12-19

## 2019-12-19 ENCOUNTER — LAB (OUTPATIENT)
Dept: ONCOLOGY | Facility: HOSPITAL | Age: 59
End: 2019-12-19

## 2019-12-19 VITALS
RESPIRATION RATE: 18 BRPM | WEIGHT: 134.8 LBS | TEMPERATURE: 97.8 F | BODY MASS INDEX: 22.46 KG/M2 | SYSTOLIC BLOOD PRESSURE: 116 MMHG | HEART RATE: 68 BPM | HEIGHT: 65 IN | DIASTOLIC BLOOD PRESSURE: 91 MMHG

## 2019-12-19 DIAGNOSIS — C15.4 MALIGNANT NEOPLASM OF THORACIC ESOPHAGUS (HCC): Primary | ICD-10-CM

## 2019-12-19 DIAGNOSIS — C15.4 MALIGNANT NEOPLASM OF THORACIC ESOPHAGUS (HCC): ICD-10-CM

## 2019-12-19 LAB
ALBUMIN SERPL-MCNC: 3.8 G/DL (ref 3.5–5.2)
ALBUMIN/GLOB SERPL: 1.3 G/DL
ALP SERPL-CCNC: 98 U/L (ref 39–117)
ALT SERPL W P-5'-P-CCNC: 8 U/L (ref 1–33)
ANION GAP SERPL CALCULATED.3IONS-SCNC: 6 MMOL/L (ref 5–15)
AST SERPL-CCNC: 13 U/L (ref 1–32)
BASOPHILS # BLD AUTO: 0.03 10*3/MM3 (ref 0–0.2)
BASOPHILS NFR BLD AUTO: 0.6 % (ref 0–1.5)
BILIRUB SERPL-MCNC: 0.5 MG/DL (ref 0.2–1.2)
BUN BLD-MCNC: 13 MG/DL (ref 8–23)
BUN/CREAT SERPL: 15.7 (ref 7–25)
CALCIUM SPEC-SCNC: 8.9 MG/DL (ref 8.6–10.5)
CHLORIDE SERPL-SCNC: 106 MMOL/L (ref 98–107)
CO2 SERPL-SCNC: 30 MMOL/L (ref 22–29)
CREAT BLD-MCNC: 0.83 MG/DL (ref 0.57–1)
DEPRECATED RDW RBC AUTO: 51.8 FL (ref 37–54)
EOSINOPHIL # BLD AUTO: 0.07 10*3/MM3 (ref 0–0.4)
EOSINOPHIL NFR BLD AUTO: 1.4 % (ref 0.3–6.2)
ERYTHROCYTE [DISTWIDTH] IN BLOOD BY AUTOMATED COUNT: 14.9 % (ref 12.3–15.4)
GFR SERPL CREATININE-BSD FRML MDRD: 85 ML/MIN/1.73
GLOBULIN UR ELPH-MCNC: 2.9 GM/DL
GLUCOSE BLD-MCNC: 107 MG/DL (ref 65–99)
HCT VFR BLD AUTO: 38.2 % (ref 34–46.6)
HGB BLD-MCNC: 12.2 G/DL (ref 12–15.9)
IMM GRANULOCYTES # BLD AUTO: 0.01 10*3/MM3 (ref 0–0.05)
IMM GRANULOCYTES NFR BLD AUTO: 0.2 % (ref 0–0.5)
LYMPHOCYTES # BLD AUTO: 1.05 10*3/MM3 (ref 0.7–3.1)
LYMPHOCYTES NFR BLD AUTO: 20.3 % (ref 19.6–45.3)
MCH RBC QN AUTO: 30 PG (ref 26.6–33)
MCHC RBC AUTO-ENTMCNC: 31.9 G/DL (ref 31.5–35.7)
MCV RBC AUTO: 93.9 FL (ref 79–97)
MONOCYTES # BLD AUTO: 0.56 10*3/MM3 (ref 0.1–0.9)
MONOCYTES NFR BLD AUTO: 10.8 % (ref 5–12)
NEUTROPHILS # BLD AUTO: 3.45 10*3/MM3 (ref 1.7–7)
NEUTROPHILS NFR BLD AUTO: 66.7 % (ref 42.7–76)
NRBC BLD AUTO-RTO: 0 /100 WBC (ref 0–0.2)
PLATELET # BLD AUTO: 214 10*3/MM3 (ref 140–450)
PMV BLD AUTO: 11.5 FL (ref 6–12)
POTASSIUM BLD-SCNC: 3.5 MMOL/L (ref 3.5–5.2)
PROT SERPL-MCNC: 6.7 G/DL (ref 6–8.5)
RBC # BLD AUTO: 4.07 10*6/MM3 (ref 3.77–5.28)
SODIUM BLD-SCNC: 142 MMOL/L (ref 136–145)
WBC NRBC COR # BLD: 5.17 10*3/MM3 (ref 3.4–10.8)

## 2019-12-19 PROCEDURE — 99213 OFFICE O/P EST LOW 20 MIN: CPT | Performed by: INTERNAL MEDICINE

## 2019-12-19 PROCEDURE — G8731 PAIN NEG NO PLAN: HCPCS | Performed by: INTERNAL MEDICINE

## 2019-12-19 PROCEDURE — G0463 HOSPITAL OUTPT CLINIC VISIT: HCPCS | Performed by: INTERNAL MEDICINE

## 2019-12-19 PROCEDURE — 85025 COMPLETE CBC W/AUTO DIFF WBC: CPT | Performed by: INTERNAL MEDICINE

## 2019-12-19 PROCEDURE — 80053 COMPREHEN METABOLIC PANEL: CPT | Performed by: INTERNAL MEDICINE

## 2019-12-19 PROCEDURE — 1123F ACP DISCUSS/DSCN MKR DOCD: CPT | Performed by: INTERNAL MEDICINE

## 2019-12-19 PROCEDURE — G9903 PT SCRN TBCO ID AS NON USER: HCPCS | Performed by: INTERNAL MEDICINE

## 2019-12-19 NOTE — PROGRESS NOTES
DATE OF VISIT: 2019      REASON FOR VISIT: Esophageal cancer on surveillance      HISTORY OF PRESENT ILLNESS:    59-year-old female with medical problem consisting of esophageal cancer which was moderately differentiated squamous cell and middle esophagus diagnosed in 2015, status post surgery in 2016, anxiety, hypertension is here for follow-up appointment today.  States she was having difficulty swallowing food and liquid for last few months for which she had EGD done on 2019 and had a benign stenosis which was dilated by Dr. Connell.  States her symptoms with swallowing is improved since EGD.  Denies any new lymph node enlargement.  Denies any bleeding.        PAST MEDICAL HISTORY:    Past Medical History:   Diagnosis Date   • Abnormal weight loss    • Anxiety    • Atrophic vaginitis    • Depression    • Dysphagia     pharyngoesophageal phase      • Encounter for surgical aftercare following surgery of digestive system     [place] [facility] (thoracic esophagectomy, lymphadenectomly, Bronchoscopy, Placement on Q. 3/1/16      • History of esophagogastroduodenoscopy     Normal hypopharynx.A tumor was found in the middle third of the esophagus.Mul.biopsies taken.Normal GE junction.Mild nonerosive gastritis in antrum.Biopsies taken.Normal pylorus and duodenum. 2015       • History of hysteroscopy     Hysteroscopy, dilation and curettage, and repair of vaginal laceration. 2013       • History of substance abuse (CMS/HCC)    • Hypertension    • Lumbosacral strain    • Myopia    • Presbyopia    • Primary malignant neoplasm of thoracic part of esophagus (CMS/HCC)     Squamous cell   • Tobacco dependence     1 ppd       SOCIAL HISTORY:    Social History     Tobacco Use   • Smoking status: Former Smoker     Types: Cigarettes     Last attempt to quit: 2015     Years since quittin.9   • Smokeless tobacco: Never Used   • Tobacco comment: 2018 - Patient reports she utilized  approximately 12 Cigarettes per day.  Patient reports she utilized tobacco productss approximately 25 - 30 years.   Substance Use Topics   • Alcohol use: No   • Drug use: No       Surgical History :  Past Surgical History:   Procedure Laterality Date   • ABDOMINAL SURGERY  03/01/2016    Abdominal portion of an [place] [facility] esophagogastrectomy with jejunostomy tube placement. Esophageal carcinoma.   • COLONOSCOPY N/A 12/18/2017    Procedure: COLONOSCOPY;  Surgeon: Babar Connell MD;  Location: [facility] ENDOSCOPY;  Service:    • ENDOSCOPY N/A 2/3/2017    Procedure: ESOPHAGOGASTRODUODENOSCOPY;  Surgeon: Babar Connell MD;  Location: [facility] ENDOSCOPY;  Service:    • ENDOSCOPY N/A 5/22/2017    Procedure: ESOPHAGOGASTRODUODENOSCOPY;  Surgeon: Babar Connell MD;  Location: [facility] ENDOSCOPY;  Service:    • ENDOSCOPY N/A 9/22/2017    Procedure: ESOPHAGOGASTRODUODENOSCOPY;  Surgeon: Babar Connell MD;  Location: [facility] ENDOSCOPY;  Service:    • ENDOSCOPY N/A 12/18/2017    Procedure: ESOPHAGOGASTRODUODENOSCOPY;  Surgeon: Babar Connell MD;  Location: [facility] ENDOSCOPY;  Service:    • ENDOSCOPY N/A 6/14/2018    Procedure: ESOPHAGOGASTRODUODENOSCOPY possible dilation;  Surgeon: Babar Connell MD;  Location: [facility] ENDOSCOPY;  Service: Gastroenterology   • ENDOSCOPY N/A 12/14/2018    Procedure: ESOPHAGOGASTRODUODENOSCOPY;  Surgeon: Babar Connell MD;  Location: [facility] ENDOSCOPY;  Service: Gastroenterology   • ESOPHAGUS SURGERY      Thoracic esophagectomy ([place] [facility] type).Thoracic lymphadenectomy.Fiberoptic bronchoscopy.Placement of On Q pain pump.Jejunostomy tube placement.Pyloromyotomy. 03/01/2016      • LUMBAR DISC SURGERY     • UPPER GASTROINTESTINAL ENDOSCOPY  02/03/2017   • UPPER GASTROINTESTINAL ENDOSCOPY  05/22/2017   • UPPER GASTROINTESTINAL ENDOSCOPY  09/22/2017   • UPPER GASTROINTESTINAL ENDOSCOPY  12/18/2017   • UPPER GASTROINTESTINAL ENDOSCOPY  06/14/2018   • UPPER GASTROINTESTINAL ENDOSCOPY  12/14/2018  "      ALLERGIES:    Allergies   Allergen Reactions   • Penicillins Hives   • Strawberry C [Ascorbate] Hives         FAMILY HISTORY:  Family History   Problem Relation Age of Onset   • Ovarian cancer Mother    • Cancer Mother    • Osteoporosis Other    • Heart disease Other    • Diabetes Other    • Cancer Other         Colorectal-Parent   • Asthma Other    • Asthma Sister    • Diabetes Sister    • Hypertension Sister    • Alcohol abuse Brother    • Cancer Maternal Uncle            REVIEW OF SYSTEMS:      CONSTITUTIONAL:  Complains of fatigue.  Has lost 4 pounds since last clinic visit.  Denies any fever, chills .     EYES: No visual disturbances. No discharge. No new lesions    ENMT: States her difficulty swallowing is improved since recent EGD by Dr. Connell.  No epistaxis, mouth sores.    RESPIRATORY:  No new shortness of breath. No new cough or hemoptysis.    CARDIOVASCULAR:  No chest pain or palpitations.    GASTROINTESTINAL: Complains of intermittent nausea without vomiting.  No abdominal pain or blood in the stool.    GENITOURINARY: No Dysuria or Hematuria.    MUSCULOSKELETAL:  No new back pain or arthralgia..    LYMPHATICS:  Denies any abnormal swollen glands anywhere in the body.    NEUROLOGICAL : No tingling or numbness. No headache or dizziness. No seizures or balance problems.    SKIN: No new skin lesions.    ENDOCRINE : No new heat or cold intolerance. No new polyuria . No polydipsia.        PHYSICAL EXAMINATION:      VITAL SIGNS:  /91   Pulse 68   Temp 97.8 °F (36.6 °C) (Temporal)   Resp 18   Ht 165.1 cm (65\")   Wt 61.1 kg (134 lb 12.8 oz)   LMP 03/17/1989 (Within Months) Comment: Postmenopausal  BMI 22.43 kg/m²       12/19/19  1129   Weight: 61.1 kg (134 lb 12.8 oz)         ECOG performance status: 2    CONSTITUTIONAL:  Not in any distress.    EYES: Mild conjunctival Pallor. No Icterus. No Pterygium. Extraocular Movements intact.No ptosis.    ENMT:  Normocephalic, Atraumatic.No Facial " Asymmetry noted.    NECK:  No adenopathy.Trachea midline. NO JVD.    RESPIRATORY:  Fair air entry bilateral. No rhonchi or wheezing.Fair respiratory effort.    CARDIOVASCULAR:  S1, S2. Regular rate and rhythm. No murmur or gallop appreciated.    ABDOMEN:  Soft,  nontender. Bowel sounds present in all four quadrants.  No Hepatosplenomegaly appreciated.    MUSCULOSKELETAL:  No edema.No Calf Tenderness.Decreased range of motion.    NEUROLOGIC:    No  Motor or sensory deficit appreciated. Cranial Nerves 2-12 grossly intact.    SKIN : No new skin lesion identified. Skin is warm and dry to touch.    LYMPHATICS: No new enlarged lymph nodes in neck or supraclavicular area.    PSYCHIATRY: Alert, awake and oriented ×3.        DIAGNOSTIC DATA:    Glucose   Date Value Ref Range Status   12/19/2019 107 (H) 65 - 99 mg/dL Final     Sodium   Date Value Ref Range Status   12/19/2019 142 136 - 145 mmol/L Final     Potassium   Date Value Ref Range Status   12/19/2019 3.5 3.5 - 5.2 mmol/L Final     CO2   Date Value Ref Range Status   12/19/2019 30.0 (H) 22.0 - 29.0 mmol/L Final     Chloride   Date Value Ref Range Status   12/19/2019 106 98 - 107 mmol/L Final     Anion Gap   Date Value Ref Range Status   12/19/2019 6.0 5.0 - 15.0 mmol/L Final     Creatinine   Date Value Ref Range Status   12/19/2019 0.83 0.57 - 1.00 mg/dL Final     BUN   Date Value Ref Range Status   12/19/2019 13 8 - 23 mg/dL Final     BUN/Creatinine Ratio   Date Value Ref Range Status   12/19/2019 15.7 7.0 - 25.0 Final     Calcium   Date Value Ref Range Status   12/19/2019 8.9 8.6 - 10.5 mg/dL Final     Alkaline Phosphatase   Date Value Ref Range Status   12/19/2019 98 39 - 117 U/L Final     Total Protein   Date Value Ref Range Status   12/19/2019 6.7 6.0 - 8.5 g/dL Final     ALT (SGPT)   Date Value Ref Range Status   12/19/2019 8 1 - 33 U/L Final     AST (SGOT)   Date Value Ref Range Status   12/19/2019 13 1 - 32 U/L Final     Total Bilirubin   Date Value Ref Range  Status   12/19/2019 0.5 0.2 - 1.2 mg/dL Final     Albumin   Date Value Ref Range Status   12/19/2019 3.80 3.50 - 5.20 g/dL Final     Globulin   Date Value Ref Range Status   12/19/2019 2.9 gm/dL Final     Lab Results   Component Value Date    WBC 5.17 12/19/2019    HGB 12.2 12/19/2019    HCT 38.2 12/19/2019    MCV 93.9 12/19/2019     12/19/2019     Lab Results   Component Value Date    NEUTROABS 3.45 12/19/2019     No results found for: , LABCA2, AFPTM, HCGQUANT, , CHROMGRNA, [id number], CEA, REFLABREPO        PATHOLOGY:  Pathology report from September 24, 2015 showed:  FINAL DIAGNOSIS:  A.  GASTRIC ANTRUM, MUCOSAL BIOPSY:           CHRONIC GASTRITIS.           POSITIVE FOR HELICOBACTER PYLORI.  B.  ESOPHAGEAL MASS BIOPSY:           SQUAMOUS CELL CARCINOMA, MODERATELY DIFFERENTIATED.        Pathology report from March 1, 2016 showed:  FINAL DIAGNOSIS:  A.    SEGMENT OF ESOPHAGUS AND PROXIMAL STOMACH:  STATUS POST ESOPHAGEAL MASS BIOPSY WITH A DIAGNOSIS OF       SQUAMOUS CELL CARCINOMA ([id number]) WITH SUBSEQUENT       CHEMO AND RADIATION THERAPY.  COMPLETE ABSENCE OF RESIDUAL TUMOR (EPITHELIAL STAINS       i.e.:  AE1/AE3 AND GORDY BOTH NEGATIVE).  RESECTION MARGINS FREE OF TUMOR.  B.    LEVEL 7 LYMPH NODES (2):  NO EVIDENCE OF MALIGNANCY.  C.    LEVEL 9 LYMPH NODES (2):  NO EVIDENCE OF MALIGNANCY.          RADIOLOGY DATA :  CT of chest with contrast done on June 25, 2019 was reviewed, discussed with patient, it showed:  PULMONARY PARENCHYMA:            - air spaces:      negative            - interstitium:     grossly within normal limits for age              - misc.:      No newly appearing pulmonary nodules.           MEDIASTINUM / YONG:           - heart:      normal size , no pericardial fluid           - aorta/great vessels:    normal caliber and configuration  for age          - misc.:      Status post gastric pull-through, unchanged  configuration since the prior study.          PLEURAL  COMPARTMENT:            - misc.:      no pleural fluid or mass          MISC:          - inferior neck:     negative          - osseous/body wall:  negative          - subdiaphragmatic:    as visualized, limited, grossly  unremarkable          - misc:  .     IMPRESSION:  CONCLUSION:          1. Stable examination.                 ASSESSMENT AND PLAN:      1.  Localized squamous cancer of mid esophagus:  -Patient was initially diagnosed with squamous cell cancer of middle of esophagus in September 2015.  -Was initially treated with carboplatin and Taxol with radiation that completed in January 2016.  -Patient had esophagectomy done on March 1, 2016 that showed complete pathological response.  Patient has been on observation since then.  -EGD done in December 16,2019 by Dr. Connell does not show any evidence of recurrence.  -Recent CT of chest with contrast done in June 2019 is negative for recurrence as well.  Results of CT scan were discussed with the patient.  -Since patient is 3 years out of her cancer diagnosis, no need to do any more surveillance CT scan which was discussed with the patient.  -We will ask patient to return to clinic in 6 months with repeat CBC and CMP to be done on that day.    2.  Hypertension    3.  Health maintenance: Patient does not smoke.  Not due for screening colonoscopy at this point.    4.Advance Care Planning: For now patient remains full code and is able to make her decisions.  Patient has health care surrogate mentioned on chart.    5. Pain assessment:  -Patient denies any pain today.         Camilo Coleman MD  12/19/2019  1:05 PM        EMR Dragon/Transcription disclaimer:   Much of this encounter note is an electronic transcription/translation of spoken language to printed text. The electronic translation of spoken language may permit erroneous, or at times, nonsensical words or phrases to be inadvertently transcribed; Although I have reviewed the note for such errors, some may  still exist.

## 2020-02-25 ENCOUNTER — OFFICE VISIT (OUTPATIENT)
Dept: GASTROENTEROLOGY | Facility: CLINIC | Age: 60
End: 2020-02-25

## 2020-02-25 VITALS
SYSTOLIC BLOOD PRESSURE: 122 MMHG | HEART RATE: 85 BPM | WEIGHT: 139 LBS | BODY MASS INDEX: 23.16 KG/M2 | DIASTOLIC BLOOD PRESSURE: 70 MMHG | HEIGHT: 65 IN | OXYGEN SATURATION: 99 %

## 2020-02-25 DIAGNOSIS — K21.00 GASTROESOPHAGEAL REFLUX DISEASE WITH ESOPHAGITIS: ICD-10-CM

## 2020-02-25 DIAGNOSIS — Z85.01 PERSONAL HISTORY OF ESOPHAGEAL CANCER: Primary | ICD-10-CM

## 2020-02-25 PROCEDURE — 99213 OFFICE O/P EST LOW 20 MIN: CPT | Performed by: INTERNAL MEDICINE

## 2020-02-25 RX ORDER — FAMOTIDINE 20 MG/1
20 TABLET, FILM COATED ORAL DAILY
Qty: 30 TABLET | Refills: 5 | Status: SHIPPED | OUTPATIENT
Start: 2020-02-25 | End: 2020-05-26 | Stop reason: SDUPTHER

## 2020-02-25 RX ORDER — DEXLANSOPRAZOLE 60 MG/1
60 CAPSULE, DELAYED RELEASE ORAL DAILY
Qty: 30 CAPSULE | Refills: 5 | Status: SHIPPED | OUTPATIENT
Start: 2020-02-25 | End: 2020-05-26 | Stop reason: SDUPTHER

## 2020-02-25 NOTE — PATIENT INSTRUCTIONS

## 2020-02-25 NOTE — PROGRESS NOTES
[facility]      Chief Complaint:   Chief Complaint   Patient presents with   • Gastroesophageal reflux disease with esophagitis +1 more       Subjective     HPI:   Patient with history of squamous cell cancer of the distal esophagus patient had surgical resection of this with gastric pull-up.  Patient has had dysphasia in the past with strictures around the anastomotic site which has been dilated in the past.  Patient states no difficulty with dysphasia at this time.  Patient has been taking proton pump inhibitor and an H2 blocker doing well with this.    Plan; we will continue patient on Dexilant and Pepcid.  Patient follow-up in 3 months    Past Medical History:   Past Medical History:   Diagnosis Date   • Abnormal weight loss    • Anxiety    • Atrophic vaginitis    • Depression    • Dysphagia     pharyngoesophageal phase      • Encounter for surgical aftercare following surgery of digestive system     [place] [facility] (thoracic esophagectomy, lymphadenectomly, Bronchoscopy, Placement on Q. 3/1/16      • History of esophagogastroduodenoscopy     Normal hypopharynx.A tumor was found in the middle third of the esophagus.Mul.biopsies taken.Normal GE junction.Mild nonerosive gastritis in antrum.Biopsies taken.Normal pylorus and duodenum. 09/24/2015       • History of hysteroscopy     Hysteroscopy, dilation and curettage, and repair of vaginal laceration. 08/03/2013       • History of substance abuse (CMS/HCC)    • Hypertension    • Lumbosacral strain    • Myopia    • Presbyopia    • Primary malignant neoplasm of thoracic part of esophagus (CMS/HCC)     Squamous cell   • Tobacco dependence     1 ppd       Past Surgical History:    Past Surgical History:   Procedure Laterality Date   • ABDOMINAL SURGERY  03/01/2016    Abdominal portion of an [place] [facility] esophagogastrectomy with jejunostomy tube placement. Esophageal carcinoma.   • COLONOSCOPY N/A 12/18/2017    Procedure: COLONOSCOPY;  Surgeon: Babar WADDELL  MD Becki;  Location: [facility] ENDOSCOPY;  Service:    • ENDOSCOPY N/A 2/3/2017    Procedure: ESOPHAGOGASTRODUODENOSCOPY;  Surgeon: Babar Connell MD;  Location: [facility] ENDOSCOPY;  Service:    • ENDOSCOPY N/A 5/22/2017    Procedure: ESOPHAGOGASTRODUODENOSCOPY;  Surgeon: Babar Connell MD;  Location: [facility] ENDOSCOPY;  Service:    • ENDOSCOPY N/A 9/22/2017    Procedure: ESOPHAGOGASTRODUODENOSCOPY;  Surgeon: Babar Connell MD;  Location: [facility] ENDOSCOPY;  Service:    • ENDOSCOPY N/A 12/18/2017    Procedure: ESOPHAGOGASTRODUODENOSCOPY;  Surgeon: Babar Connell MD;  Location: [facility] ENDOSCOPY;  Service:    • ENDOSCOPY N/A 6/14/2018    Procedure: ESOPHAGOGASTRODUODENOSCOPY possible dilation;  Surgeon: Babar Connell MD;  Location: [facility] ENDOSCOPY;  Service: Gastroenterology   • ENDOSCOPY N/A 12/14/2018    Procedure: ESOPHAGOGASTRODUODENOSCOPY;  Surgeon: Babar Connell MD;  Location: [facility] ENDOSCOPY;  Service: Gastroenterology   • ENDOSCOPY N/A 12/16/2019    Procedure: ESOPHAGOGASTRODUODENOSCOPY;  Surgeon: Babar Connell MD;  Location: [facility] ENDOSCOPY;  Service: Gastroenterology   • ESOPHAGUS SURGERY      Thoracic esophagectomy ([place] Michael type).Thoracic lymphadenectomy.Fiberoptic bronchoscopy.Placement of On Q pain pump.Jejunostomy tube placement.Pyloromyotomy. 03/01/2016      • LUMBAR DISC SURGERY     • UPPER GASTROINTESTINAL ENDOSCOPY  02/03/2017   • UPPER GASTROINTESTINAL ENDOSCOPY  05/22/2017   • UPPER GASTROINTESTINAL ENDOSCOPY  09/22/2017   • UPPER GASTROINTESTINAL ENDOSCOPY  12/18/2017   • UPPER GASTROINTESTINAL ENDOSCOPY  06/14/2018   • UPPER GASTROINTESTINAL ENDOSCOPY  12/14/2018       Family History:  Family History   Problem Relation Age of Onset   • Ovarian cancer Mother    • Cancer Mother    • Osteoporosis Other    • Heart disease Other    • Diabetes Other    • Cancer Other         Colorectal-Parent   • Asthma Other    • Asthma Sister    • Diabetes Sister    • Hypertension Sister    • Alcohol  "abuse Brother    • Cancer Maternal Uncle        Social History:   reports that she quit smoking about 5 years ago. Her smoking use included cigarettes. She has never used smokeless tobacco. She reports that she does not drink alcohol or use drugs.    Medications:   Prior to Admission medications    Medication Sig Start Date End Date Taking? Authorizing Provider   albuterol (PROVENTIL HFA;VENTOLIN HFA) 108 (90 BASE) MCG/ACT inhaler Inhale 2 puffs Every 4 (Four) Hours As Needed for wheezing. 2/17/17  Yes Tiffany Jimenez MD   amLODIPine (NORVASC) 10 MG tablet Take 10 mg by mouth Daily.  2/25/20 Yes ProviderZoey MD   dexlansoprazole (DEXILANT) 60 MG capsule Take 1 capsule by mouth Daily. 2/25/20   Babar Connell MD   famotidine (PEPCID) 20 MG tablet Take 1 tablet by mouth Daily. 2/25/20   Babar Connell MD       Allergies:  Penicillins and Strawberry c [ascorbate]    ROS:    Review of Systems   Constitutional: Negative for activity change, appetite change, chills, diaphoresis, fatigue, fever and unexpected weight change.   HENT: Negative for sore throat and trouble swallowing.    Respiratory: Negative for shortness of breath.    Gastrointestinal: Negative for abdominal distention, abdominal pain, anal bleeding, blood in stool, constipation, diarrhea, nausea, rectal pain and vomiting.   Endocrine: Negative for polydipsia, polyphagia and polyuria.   Genitourinary: Negative for difficulty urinating.   Musculoskeletal: Negative for arthralgias.   Skin: Negative for pallor.   Allergic/Immunologic: Negative for food allergies.   Neurological: Negative for weakness and light-headedness.   Psychiatric/Behavioral: Negative for behavioral problems.     Objective     Blood pressure 122/70, pulse 85, height 165.1 cm (65\"), weight 63 kg (139 lb), last menstrual period 03/17/1989, SpO2 99 %, not currently breastfeeding.    Physical Exam   Constitutional: She is oriented to person, place, and time. She appears " well-developed and well-nourished. No distress.   HENT:   Head: Normocephalic and atraumatic.   Cardiovascular: Normal rate, regular rhythm, normal heart sounds and intact distal pulses. Exam reveals no gallop and no friction rub.   No murmur heard.  Pulmonary/Chest: Breath sounds normal. No respiratory distress. She has no wheezes. She has no rales. She exhibits no tenderness.   Abdominal: Soft. Bowel sounds are normal. She exhibits no distension and no mass. There is no tenderness. There is no rebound and no guarding. No hernia.   Musculoskeletal: Normal range of motion. She exhibits no edema.   Neurological: She is alert and oriented to person, place, and time.   Skin: Skin is warm and dry. No rash noted. She is not diaphoretic. No erythema. No pallor.   Psychiatric: She has a normal mood and affect. Her behavior is normal. Judgment and thought content normal.        Assessment/Plan   Maddison was seen today for gastroesophageal reflux disease with esophagitis +1 more.    Diagnoses and all orders for this visit:    Personal history of esophageal cancer    Gastroesophageal reflux disease with esophagitis    Other orders  -     dexlansoprazole (DEXILANT) 60 MG capsule; Take 1 capsule by mouth Daily.  -     famotidine (PEPCID) 20 MG tablet; Take 1 tablet by mouth Daily.        * Surgery not found *     Diagnosis Plan   1. Personal history of esophageal cancer     2. Gastroesophageal reflux disease with esophagitis         Anticipated Surgical Procedure:  No orders of the defined types were placed in this encounter.      The risks, benefits, and alternatives of this procedure have been discussed with the patient or the responsible party- the patient understands and agrees to proceed.

## 2020-05-26 ENCOUNTER — OFFICE VISIT (OUTPATIENT)
Dept: GASTROENTEROLOGY | Facility: CLINIC | Age: 60
End: 2020-05-26

## 2020-05-26 VITALS
WEIGHT: 136.8 LBS | SYSTOLIC BLOOD PRESSURE: 110 MMHG | DIASTOLIC BLOOD PRESSURE: 70 MMHG | HEIGHT: 65 IN | HEART RATE: 71 BPM | OXYGEN SATURATION: 98 % | BODY MASS INDEX: 22.79 KG/M2

## 2020-05-26 DIAGNOSIS — R13.19 OTHER DYSPHAGIA: Primary | ICD-10-CM

## 2020-05-26 PROCEDURE — 99214 OFFICE O/P EST MOD 30 MIN: CPT | Performed by: INTERNAL MEDICINE

## 2020-05-26 RX ORDER — DEXTROSE AND SODIUM CHLORIDE 5; .45 G/100ML; G/100ML
30 INJECTION, SOLUTION INTRAVENOUS CONTINUOUS PRN
Status: CANCELLED | OUTPATIENT
Start: 2020-06-03

## 2020-05-26 RX ORDER — FAMOTIDINE 20 MG/1
20 TABLET, FILM COATED ORAL DAILY
Qty: 30 TABLET | Refills: 5 | Status: SHIPPED | OUTPATIENT
Start: 2020-05-26 | End: 2021-02-04 | Stop reason: SDUPTHER

## 2020-05-26 RX ORDER — DEXLANSOPRAZOLE 60 MG/1
60 CAPSULE, DELAYED RELEASE ORAL DAILY
Qty: 30 CAPSULE | Refills: 5 | Status: SHIPPED | OUTPATIENT
Start: 2020-05-26 | End: 2021-02-04 | Stop reason: SDUPTHER

## 2020-05-26 NOTE — PROGRESS NOTES
[facility]      Chief Complaint:   Chief Complaint   Patient presents with   • Gastroesophageal reflux disease with esophagitis       Subjective     HPI:   Patient with dysphasia.  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    Plan; we will schedule patient for EGD with dilatation of the anastomotic site if no improvement with this will consider placing patient on Carafate    Past Medical History:   Past Medical History:   Diagnosis Date   • Abnormal weight loss    • Anxiety    • Atrophic vaginitis    • Depression    • Dysphagia     pharyngoesophageal phase      • Encounter for surgical aftercare following surgery of digestive system     Walter Michael (thoracic esophagectomy, lymphadenectomly, Bronchoscopy, Placement on Q. 3/1/16      • History of esophagogastroduodenoscopy     Normal hypopharynx.A tumor was found in the middle third of the esophagus.Mul.biopsies taken.Normal GE junction.Mild nonerosive gastritis in antrum.Biopsies taken.Normal pylorus and duodenum. 09/24/2015       • History of hysteroscopy     Hysteroscopy, dilation and curettage, and repair of vaginal laceration. 08/03/2013       • History of substance abuse (CMS/HCC)    • Hypertension    • Lumbosacral strain    • Myopia    • Presbyopia    • Primary malignant neoplasm of thoracic part of esophagus (CMS/HCC)     Squamous cell   • Tobacco dependence     1 ppd       Past Surgical History:  Past Surgical History:   Procedure Laterality Date   • ABDOMINAL SURGERY  03/01/2016    Abdominal portion of an Walter Michael esophagogastrectomy with jejunostomy tube placement. Esophageal carcinoma.   • COLONOSCOPY N/A 12/18/2017    Procedure: COLONOSCOPY;  Surgeon: Babar Connell MD;  Location: [facility] ENDOSCOPY;  Service:    • ENDOSCOPY N/A 2/3/2017     Procedure: ESOPHAGOGASTRODUODENOSCOPY;  Surgeon: Babar Connell MD;  Location: [facility] ENDOSCOPY;  Service:    • ENDOSCOPY N/A 5/22/2017    Procedure: ESOPHAGOGASTRODUODENOSCOPY;  Surgeon: Babar Connell MD;  Location: [facility] ENDOSCOPY;  Service:    • ENDOSCOPY N/A 9/22/2017    Procedure: ESOPHAGOGASTRODUODENOSCOPY;  Surgeon: Babar Connell MD;  Location: [facility] ENDOSCOPY;  Service:    • ENDOSCOPY N/A 12/18/2017    Procedure: ESOPHAGOGASTRODUODENOSCOPY;  Surgeon: Babar Connell MD;  Location: [facility] ENDOSCOPY;  Service:    • ENDOSCOPY N/A 6/14/2018    Procedure: ESOPHAGOGASTRODUODENOSCOPY possible dilation;  Surgeon: Babar Connell MD;  Location: [facility] ENDOSCOPY;  Service: Gastroenterology   • ENDOSCOPY N/A 12/14/2018    Procedure: ESOPHAGOGASTRODUODENOSCOPY;  Surgeon: Babar Connell MD;  Location: [facility] ENDOSCOPY;  Service: Gastroenterology   • ENDOSCOPY N/A 12/16/2019    Procedure: ESOPHAGOGASTRODUODENOSCOPY;  Surgeon: Babar Connell MD;  Location: [facility] ENDOSCOPY;  Service: Gastroenterology   • ESOPHAGUS SURGERY      Thoracic esophagectomy ([place] Michael type).Thoracic lymphadenectomy.Fiberoptic bronchoscopy.Placement of On Q pain pump.Jejunostomy tube placement.Pyloromyotomy. 03/01/2016      • LUMBAR DISC SURGERY     • UPPER GASTROINTESTINAL ENDOSCOPY  02/03/2017   • UPPER GASTROINTESTINAL ENDOSCOPY  05/22/2017   • UPPER GASTROINTESTINAL ENDOSCOPY  09/22/2017   • UPPER GASTROINTESTINAL ENDOSCOPY  12/18/2017   • UPPER GASTROINTESTINAL ENDOSCOPY  06/14/2018   • UPPER GASTROINTESTINAL ENDOSCOPY  12/14/2018       Family History:  Family History   Problem Relation Age of Onset   • Ovarian cancer Mother    • Cancer Mother    • Osteoporosis Other    • Heart disease Other    • Diabetes Other    • Cancer Other         Colorectal-Parent   • Asthma Other    • Asthma Sister    • Diabetes Sister    • Hypertension Sister    • Alcohol abuse Brother    • Cancer Maternal Uncle        Social History:   reports that she  "quit smoking about 5 years ago. Her smoking use included cigarettes. She has never used smokeless tobacco. She reports that she does not drink alcohol or use drugs.    Medications:   Prior to Admission medications    Medication Sig Start Date End Date Taking? Authorizing Provider   albuterol (PROVENTIL HFA;VENTOLIN HFA) 108 (90 BASE) MCG/ACT inhaler Inhale 2 puffs Every 4 (Four) Hours As Needed for wheezing. 2/17/17  Yes Tiffany Jimenez MD   dexlansoprazole (Dexilant) 60 MG capsule Take 1 capsule by mouth Daily. 5/26/20  Yes Babar Connell MD   famotidine (Pepcid) 20 MG tablet Take 1 tablet by mouth Daily. 5/26/20  Yes Babar Connell MD   dexlansoprazole (DEXILANT) 60 MG capsule Take 1 capsule by mouth Daily. 2/25/20 5/26/20 Yes Babar Connell MD   famotidine (PEPCID) 20 MG tablet Take 1 tablet by mouth Daily. 2/25/20 5/26/20 Yes Babar Connell MD       Allergies:  Penicillins and Strawberry c [ascorbate]    ROS:    Review of Systems   Constitutional: Negative for activity change, appetite change, chills, diaphoresis, fatigue, fever and unexpected weight change.   HENT: Positive for trouble swallowing. Negative for sore throat.    Respiratory: Negative for shortness of breath.    Gastrointestinal: Negative for abdominal distention, abdominal pain, anal bleeding, blood in stool, constipation, diarrhea, nausea, rectal pain and vomiting.   Endocrine: Negative for polydipsia, polyphagia and polyuria.   Genitourinary: Negative for difficulty urinating.   Musculoskeletal: Negative for arthralgias.   Skin: Negative for pallor.   Allergic/Immunologic: Negative for food allergies.   Neurological: Negative for weakness and light-headedness.   Psychiatric/Behavioral: Negative for behavioral problems.     Objective     Blood pressure 110/70, pulse 71, height 165.1 cm (65\"), weight 62.1 kg (136 lb 12.8 oz), last menstrual period 03/17/1989, SpO2 98 %, not currently breastfeeding.    Physical Exam   Constitutional: She " is oriented to person, place, and time. She appears well-developed and well-nourished. No distress.   HENT:   Head: Normocephalic and atraumatic.   Cardiovascular: Normal rate, regular rhythm, normal heart sounds and intact distal pulses. Exam reveals no gallop and no friction rub.   No murmur heard.  Pulmonary/Chest: Breath sounds normal. No respiratory distress. She has no wheezes. She has no rales. She exhibits no tenderness.   Abdominal: Soft. Bowel sounds are normal. She exhibits no distension and no mass. There is no tenderness. There is no rebound and no guarding. No hernia.   Musculoskeletal: Normal range of motion. She exhibits no edema.   Neurological: She is alert and oriented to person, place, and time.   Skin: Skin is warm and dry. No rash noted. She is not diaphoretic. No erythema. No pallor.   Psychiatric: She has a normal mood and affect. Her behavior is normal. Judgment and thought content normal.        Assessment/Plan   Maddison was seen today for gastroesophageal reflux disease with esophagitis.    Diagnoses and all orders for this visit:    Other dysphagia  -     Case Request; Standing  -     Case Request    Other orders  -     famotidine (Pepcid) 20 MG tablet; Take 1 tablet by mouth Daily.  -     dexlansoprazole (Dexilant) 60 MG capsule; Take 1 capsule by mouth Daily.  -     Follow Anesthesia Guidelines / Standing Orders; Future  -     Obtain Informed Consent; Future        ESOPHAGOGASTRODUODENOSCOPY (N/A)     Diagnosis Plan   1. Other dysphagia  Case Request    dextrose 5 % and sodium chloride 0.45 % infusion    Case Request       Anticipated Surgical Procedure:  Orders Placed This Encounter   Procedures   • Follow Anesthesia Guidelines / Standing Orders     Standing Status:   Future   • Obtain Informed Consent     Standing Status:   Future     Order Specific Question:   Informed Consent Given For     Answer:   ESOPHAGOGASTRODUODENOSCOPY       The risks, benefits, and alternatives of this  procedure have been discussed with the patient or the responsible party- the patient understands and agrees to proceed.

## 2020-05-26 NOTE — PATIENT INSTRUCTIONS

## 2020-05-31 LAB — SARS-COV-2 RNA RESP QL NAA+PROBE: NOT DETECTED

## 2020-05-31 PROCEDURE — 87635 SARS-COV-2 COVID-19 AMP PRB: CPT | Performed by: INTERNAL MEDICINE

## 2020-06-03 ENCOUNTER — HOSPITAL ENCOUNTER (OUTPATIENT)
Facility: HOSPITAL | Age: 60
Setting detail: HOSPITAL OUTPATIENT SURGERY
Discharge: HOME | End: 2020-06-03
Attending: INTERNAL MEDICINE | Admitting: INTERNAL MEDICINE

## 2020-06-03 ENCOUNTER — ANESTHESIA EVENT (OUTPATIENT)
Dept: GASTROENTEROLOGY | Facility: HOSPITAL | Age: 60
End: 2020-06-03

## 2020-06-03 ENCOUNTER — ANESTHESIA (OUTPATIENT)
Dept: GASTROENTEROLOGY | Facility: HOSPITAL | Age: 60
End: 2020-06-03

## 2020-06-03 VITALS
HEART RATE: 88 BPM | HEIGHT: 65 IN | WEIGHT: 135 LBS | OXYGEN SATURATION: 94 % | DIASTOLIC BLOOD PRESSURE: 63 MMHG | SYSTOLIC BLOOD PRESSURE: 102 MMHG | BODY MASS INDEX: 22.49 KG/M2 | TEMPERATURE: 96.9 F | RESPIRATION RATE: 18 BRPM

## 2020-06-03 DIAGNOSIS — R13.19 OTHER DYSPHAGIA: ICD-10-CM

## 2020-06-03 PROCEDURE — 43248 EGD GUIDE WIRE INSERTION: CPT | Performed by: INTERNAL MEDICINE

## 2020-06-03 PROCEDURE — 25010000002 PROPOFOL 10 MG/ML EMULSION: Performed by: NURSE ANESTHETIST, CERTIFIED REGISTERED

## 2020-06-03 RX ORDER — PROPOFOL 10 MG/ML
VIAL (ML) INTRAVENOUS AS NEEDED
Status: DISCONTINUED | OUTPATIENT
Start: 2020-06-03 | End: 2020-06-03 | Stop reason: SURG

## 2020-06-03 RX ORDER — DEXTROSE AND SODIUM CHLORIDE 5; .45 G/100ML; G/100ML
30 INJECTION, SOLUTION INTRAVENOUS CONTINUOUS PRN
Status: DISCONTINUED | OUTPATIENT
Start: 2020-06-03 | End: 2020-06-03 | Stop reason: HOSPADM

## 2020-06-03 RX ORDER — PROMETHAZINE HYDROCHLORIDE 25 MG/ML
12.5 INJECTION, SOLUTION INTRAMUSCULAR; INTRAVENOUS ONCE AS NEEDED
Status: DISCONTINUED | OUTPATIENT
Start: 2020-06-03 | End: 2020-06-03 | Stop reason: HOSPADM

## 2020-06-03 RX ORDER — LIDOCAINE HYDROCHLORIDE 20 MG/ML
INJECTION, SOLUTION INTRAVENOUS AS NEEDED
Status: DISCONTINUED | OUTPATIENT
Start: 2020-06-03 | End: 2020-06-03 | Stop reason: SURG

## 2020-06-03 RX ORDER — MEPERIDINE HYDROCHLORIDE 25 MG/ML
12.5 INJECTION INTRAMUSCULAR; INTRAVENOUS; SUBCUTANEOUS
Status: DISCONTINUED | OUTPATIENT
Start: 2020-06-03 | End: 2020-06-03 | Stop reason: HOSPADM

## 2020-06-03 RX ORDER — PROMETHAZINE HYDROCHLORIDE 25 MG/1
25 SUPPOSITORY RECTAL ONCE AS NEEDED
Status: DISCONTINUED | OUTPATIENT
Start: 2020-06-03 | End: 2020-06-03 | Stop reason: HOSPADM

## 2020-06-03 RX ORDER — PROMETHAZINE HYDROCHLORIDE 25 MG/1
25 TABLET ORAL ONCE AS NEEDED
Status: DISCONTINUED | OUTPATIENT
Start: 2020-06-03 | End: 2020-06-03 | Stop reason: HOSPADM

## 2020-06-03 RX ORDER — ONDANSETRON 2 MG/ML
4 INJECTION INTRAMUSCULAR; INTRAVENOUS ONCE AS NEEDED
Status: DISCONTINUED | OUTPATIENT
Start: 2020-06-03 | End: 2020-06-03 | Stop reason: HOSPADM

## 2020-06-03 RX ADMIN — LIDOCAINE HYDROCHLORIDE 50 MG: 20 INJECTION, SOLUTION INTRAVENOUS at 10:38

## 2020-06-03 RX ADMIN — PROPOFOL 50 MG: 10 INJECTION, EMULSION INTRAVENOUS at 10:40

## 2020-06-03 RX ADMIN — PROPOFOL 50 MG: 10 INJECTION, EMULSION INTRAVENOUS at 10:42

## 2020-06-03 RX ADMIN — DEXTROSE AND SODIUM CHLORIDE 30 ML/HR: 5; 450 INJECTION, SOLUTION INTRAVENOUS at 10:15

## 2020-06-03 RX ADMIN — PROPOFOL 50 MG: 10 INJECTION, EMULSION INTRAVENOUS at 10:38

## 2020-06-03 RX ADMIN — PROPOFOL 50 MG: 10 INJECTION, EMULSION INTRAVENOUS at 10:44

## 2020-06-03 NOTE — ANESTHESIA PREPROCEDURE EVALUATION
Anesthesia Evaluation     NPO Solid Status: > 8 hours  NPO Liquid Status: > 8 hours           Airway   Mallampati: III  TM distance: >3 FB  No difficulty expected  Dental - normal exam     Pulmonary - normal exam   (+) COPD, shortness of breath,   Cardiovascular - normal exam    (+) hypertension,       Neuro/Psych  (+) psychiatric history,     GI/Hepatic/Renal/Endo    (+)  GERD,      Musculoskeletal     Abdominal    Substance History      OB/GYN          Other      history of cancer                    Anesthesia Plan    ASA 3     MAC     intravenous induction     Anesthetic plan, all risks, benefits, and alternatives have been provided, discussed and informed consent has been obtained with: patient.       n/a

## 2020-06-03 NOTE — ANESTHESIA POSTPROCEDURE EVALUATION
Patient: Maddison Sheridan    Procedure Summary     Date:  06/03/20 Room / Location:  [facility] ENDOSCOPY 1 / [facility] ENDOSCOPY    Anesthesia Start:  1034 Anesthesia Stop:  1046    Procedure:  ESOPHAGOGASTRODUODENOSCOPY (N/A ) Diagnosis:       Other dysphagia      (Other dysphagia [R13.19])    Surgeon:  Babar Connell MD Provider:  Cezar Real CRNA    Anesthesia Type:  MAC ASA Status:  3          Anesthesia Type: MAC    Vitals  No vitals data found for the desired time range.          Post Anesthesia Care and Evaluation    Patient location during evaluation: bedside  Patient participation: complete - patient cannot participate  Level of consciousness: awake  Pain score: 0  Pain management: adequate  Airway patency: patent  Anesthetic complications: No anesthetic complications  PONV Status: none  Cardiovascular status: acceptable  Respiratory status: acceptable  Hydration status: acceptable

## 2020-06-09 ENCOUNTER — LAB (OUTPATIENT)
Dept: ONCOLOGY | Facility: HOSPITAL | Age: 60
End: 2020-06-09

## 2020-06-09 DIAGNOSIS — C15.4 MALIGNANT NEOPLASM OF THORACIC ESOPHAGUS (HCC): ICD-10-CM

## 2020-06-09 LAB
ALBUMIN SERPL-MCNC: 3.6 G/DL (ref 3.5–5.2)
ALBUMIN/GLOB SERPL: 1.2 G/DL
ALP SERPL-CCNC: 102 U/L (ref 39–117)
ALT SERPL W P-5'-P-CCNC: 5 U/L (ref 1–33)
ANION GAP SERPL CALCULATED.3IONS-SCNC: 10 MMOL/L (ref 5–15)
AST SERPL-CCNC: 13 U/L (ref 1–32)
BASOPHILS # BLD AUTO: 0.05 10*3/MM3 (ref 0–0.2)
BASOPHILS NFR BLD AUTO: 1 % (ref 0–1.5)
BILIRUB SERPL-MCNC: 0.2 MG/DL (ref 0.2–1.2)
BUN BLD-MCNC: 12 MG/DL (ref 8–23)
BUN/CREAT SERPL: 13 (ref 7–25)
CALCIUM SPEC-SCNC: 8.2 MG/DL (ref 8.6–10.5)
CHLORIDE SERPL-SCNC: 106 MMOL/L (ref 98–107)
CO2 SERPL-SCNC: 24 MMOL/L (ref 22–29)
CREAT BLD-MCNC: 0.92 MG/DL (ref 0.57–1)
DEPRECATED RDW RBC AUTO: 45 FL (ref 37–54)
EOSINOPHIL # BLD AUTO: 0.08 10*3/MM3 (ref 0–0.4)
EOSINOPHIL NFR BLD AUTO: 1.7 % (ref 0.3–6.2)
ERYTHROCYTE [DISTWIDTH] IN BLOOD BY AUTOMATED COUNT: 13.9 % (ref 12.3–15.4)
GFR SERPL CREATININE-BSD FRML MDRD: 75 ML/MIN/1.73
GLOBULIN UR ELPH-MCNC: 2.9 GM/DL
GLUCOSE BLD-MCNC: 138 MG/DL (ref 65–99)
HCT VFR BLD AUTO: 34.3 % (ref 34–46.6)
HGB BLD-MCNC: 11.3 G/DL (ref 12–15.9)
IMM GRANULOCYTES # BLD AUTO: 0.02 10*3/MM3 (ref 0–0.05)
IMM GRANULOCYTES NFR BLD AUTO: 0.4 % (ref 0–0.5)
LYMPHOCYTES # BLD AUTO: 1.2 10*3/MM3 (ref 0.7–3.1)
LYMPHOCYTES NFR BLD AUTO: 24.8 % (ref 19.6–45.3)
MCH RBC QN AUTO: 29.5 PG (ref 26.6–33)
MCHC RBC AUTO-ENTMCNC: 32.9 G/DL (ref 31.5–35.7)
MCV RBC AUTO: 89.6 FL (ref 79–97)
MONOCYTES # BLD AUTO: 0.62 10*3/MM3 (ref 0.1–0.9)
MONOCYTES NFR BLD AUTO: 12.8 % (ref 5–12)
NEUTROPHILS # BLD AUTO: 2.87 10*3/MM3 (ref 1.7–7)
NEUTROPHILS NFR BLD AUTO: 59.3 % (ref 42.7–76)
NRBC BLD AUTO-RTO: 0 /100 WBC (ref 0–0.2)
PLATELET # BLD AUTO: 290 10*3/MM3 (ref 140–450)
PMV BLD AUTO: 11.4 FL (ref 6–12)
POTASSIUM BLD-SCNC: 3.7 MMOL/L (ref 3.5–5.2)
PROT SERPL-MCNC: 6.5 G/DL (ref 6–8.5)
RBC # BLD AUTO: 3.83 10*6/MM3 (ref 3.77–5.28)
SODIUM BLD-SCNC: 140 MMOL/L (ref 136–145)
WBC NRBC COR # BLD: 4.84 10*3/MM3 (ref 3.4–10.8)

## 2020-06-09 PROCEDURE — 85025 COMPLETE CBC W/AUTO DIFF WBC: CPT

## 2020-06-09 PROCEDURE — 80053 COMPREHEN METABOLIC PANEL: CPT

## 2020-06-09 PROCEDURE — 36415 COLL VENOUS BLD VENIPUNCTURE: CPT | Performed by: NURSE PRACTITIONER

## 2020-06-11 ENCOUNTER — APPOINTMENT (OUTPATIENT)
Dept: ONCOLOGY | Facility: HOSPITAL | Age: 60
End: 2020-06-11

## 2020-06-11 ENCOUNTER — OFFICE VISIT (OUTPATIENT)
Dept: ONCOLOGY | Facility: CLINIC | Age: 60
End: 2020-06-11

## 2020-06-11 DIAGNOSIS — D50.8 OTHER IRON DEFICIENCY ANEMIA: Primary | ICD-10-CM

## 2020-06-11 PROCEDURE — 99441 PR PHYS/QHP TELEPHONE EVALUATION 5-10 MIN: CPT | Performed by: NURSE PRACTITIONER

## 2020-06-11 NOTE — PROGRESS NOTES
This visit has been rescheduled as a phone visit to comply with patient safety concerns in accordance with CDC recommendations. Total time of discussion was 10 minutes.    You have chosen to receive care through a telephone visit. Do you consent to use a telephone visit for your medical care today? Yes      Diagnosis: Esophageal cancer currently on surveillance    HPI:  59-year-old female with medical problem consisting of esophageal cancer which was moderately differentiated squamous cell and middle esophagus diagnosed in September 2015, status post surgery in March 2016. She is having tele health visit today. Last seen by Dr. Coleman in December 2019.   Since she was here last she has had EGD with Dr. Connell 6/3/2020 with no evidence of tumor recurrence. She did have benign stenosis that was dilated and pt states she is swallowing much better. Denies any new lymph node enlargement.  Denies any bleeding.      Review of Systems   Constitutional: Negative.    HENT: Negative.    Eyes: Negative.    Respiratory: Negative.    Cardiovascular: Negative.    Gastrointestinal: Negative.    Endocrine: Negative.    Genitourinary: Negative.    Musculoskeletal: Negative.    Skin: Negative.    Allergic/Immunologic: Negative.    Neurological: Negative.    Hematological: Negative.    Psychiatric/Behavioral: Negative.        Component      Latest Ref Rng & Units 6/9/2020             WBC      3.40 - 10.80 10*3/mm3 4.84   RBC      3.77 - 5.28 10*6/mm3 3.83   Hemoglobin      12.0 - 15.9 g/dL 11.3 (L)   Hematocrit      34.0 - 46.6 % 34.3   MCV      79.0 - 97.0 fL 89.6   MCH      26.6 - 33.0 pg 29.5   MCHC      31.5 - 35.7 g/dL 32.9   RDW      12.3 - 15.4 % 13.9   Platelets      140 - 450 10*3/mm3 290   MPV      6.0 - 12.0 fL 11.4   Neutrophil Rel %      42.7 - 76.0 % 59.3   Lymphocyte Rel %      19.6 - 45.3 % 24.8   Monocyte Rel %      5.0 - 12.0 % 12.8 (H)   Eosinophil Rel %      0.3 - 6.2 % 1.7   Basophil Rel %      0.0 - 1.5 % 1.0    Immature Granulocyte Rel %      0.0 - 0.5 % 0.4   Neutrophils Absolute      1.70 - 7.00 10*3/mm3 2.87   Lymphocytes Absolute      0.70 - 3.10 10*3/mm3 1.20   Monocytes Absolute      0.10 - 0.90 10*3/mm3 0.62   Eosinophils Absolute      0.00 - 0.40 10*3/mm3 0.08   Basophils Absolute      0.00 - 0.20 10*3/mm3 0.05   Immature Grans, Absolute      0.00 - 0.05 10*3/mm3 0.02   nRBC      0.0 - 0.2 /100 WBC 0.0   RDW-SD      37.0 - 54.0 fl 45.0       ASSESSMENT AND PLAN:       1.  Localized squamous cancer of mid esophagus:  -Patient was initially diagnosed with squamous cell cancer of middle of esophagus in September 2015.  -Was initially treated with carboplatin and Taxol with radiation that completed in January 2016.  -Patient had esophagectomy done on March 1, 2016 that showed complete pathological response.  Patient has been on observation since then.  -EGD done 6/3/2020  by Dr. Connell does not show any evidence of recurrence.  -Recent CT of chest with contrast done in June 2019 is negative for recurrence as well.    -It was discussed with patient at her last visit that since patient is 3 years out of her cancer diagnosis, no need to do any more surveillance CT scan.  -We will ask patient to return to clinic in 6 months with repeat CBC and CMP to be done on that day.     2.  Mild anemia, Hgb is 11.3 from labs drawn on 6/9/2020; will need to check iron studies at next visit.     3.  Health maintenance: Patient does not smoke.  Not due for screening colonoscopy at this point.

## 2020-08-13 NOTE — DISCHARGE INSTRUCTIONS
Problem: Patient Care Overview  Goal: Plan of Care Review  Outcome: Ongoing (interventions implemented as appropriate)  Flowsheets (Taken 8/13/2020 8508)  Progress: improving  Plan of Care Reviewed With: patient  Outcome Summary: PT tx completed. Pt supine in bed. Rates pnn 10/10 when walking in low back radiating from groin down R leg. CG sidelying>sit, sit<>stand Ana, amb 15', 15',40' with r wx Ana, Pain increases with walking and improves with sitting. Weakness RLE and he feels like it could still bucle. Plan for discharge today.      Outpatient Instructions for Monitored Anesthesia Care (MAC)    1. You will be released from the hospital in the care of a responsible adult who should remain with you for at least 6 hours.    2. You are at an increased risk for falls following anesthesia. Use care when changing from a lying to a sitting position. Use your assistive devices ( example: cane, walker or family member).    3. You must NOT drive a car, climb high places such as a ladder, or operate equipment such as electric knives,stoves etc...for at least 12 hours. If you are dizzy for longer than 24 hours, notify your doctor.    4. DO NOT drink any alcoholic beverages for at least 24 hours. Anesthesia may impair your judgement.    5. If you smoke, do not smoke alone due to increased risk of burns/fires.    6. DO NOT undertake any legally binding commitment for at least 24 hours. Anesthesia may impair your judgement.

## 2020-12-04 ENCOUNTER — APPOINTMENT (OUTPATIENT)
Dept: ONCOLOGY | Facility: HOSPITAL | Age: 60
End: 2020-12-04

## 2020-12-11 ENCOUNTER — APPOINTMENT (OUTPATIENT)
Dept: ONCOLOGY | Facility: CLINIC | Age: 60
End: 2020-12-11

## 2020-12-11 ENCOUNTER — LAB (OUTPATIENT)
Dept: ONCOLOGY | Facility: HOSPITAL | Age: 60
End: 2020-12-11

## 2020-12-11 DIAGNOSIS — D50.8 OTHER IRON DEFICIENCY ANEMIA: ICD-10-CM

## 2020-12-11 LAB
ALBUMIN SERPL-MCNC: 3.9 G/DL (ref 3.5–5.2)
ALBUMIN/GLOB SERPL: 1.4 G/DL
ALP SERPL-CCNC: 102 U/L (ref 39–117)
ALT SERPL W P-5'-P-CCNC: 9 U/L (ref 1–33)
ANION GAP SERPL CALCULATED.3IONS-SCNC: 10 MMOL/L (ref 5–15)
AST SERPL-CCNC: 18 U/L (ref 1–32)
BASOPHILS # BLD AUTO: 0.04 10*3/MM3 (ref 0–0.2)
BASOPHILS NFR BLD AUTO: 0.7 % (ref 0–1.5)
BILIRUB SERPL-MCNC: 0.4 MG/DL (ref 0–1.2)
BUN SERPL-MCNC: 15 MG/DL (ref 8–23)
BUN/CREAT SERPL: 17.6 (ref 7–25)
CALCIUM SPEC-SCNC: 9 MG/DL (ref 8.6–10.5)
CHLORIDE SERPL-SCNC: 104 MMOL/L (ref 98–107)
CO2 SERPL-SCNC: 25 MMOL/L (ref 22–29)
CREAT SERPL-MCNC: 0.85 MG/DL (ref 0.57–1)
DEPRECATED RDW RBC AUTO: 50 FL (ref 37–54)
EOSINOPHIL # BLD AUTO: 0.07 10*3/MM3 (ref 0–0.4)
EOSINOPHIL NFR BLD AUTO: 1.2 % (ref 0.3–6.2)
ERYTHROCYTE [DISTWIDTH] IN BLOOD BY AUTOMATED COUNT: 14.4 % (ref 12.3–15.4)
FERRITIN SERPL-MCNC: 18.09 NG/ML (ref 13–150)
FOLATE SERPL-MCNC: 7.71 NG/ML (ref 4.78–24.2)
GFR SERPL CREATININE-BSD FRML MDRD: 82 ML/MIN/1.73
GLOBULIN UR ELPH-MCNC: 2.8 GM/DL
GLUCOSE SERPL-MCNC: 134 MG/DL (ref 65–99)
HCT VFR BLD AUTO: 40.5 % (ref 34–46.6)
HGB BLD-MCNC: 13.1 G/DL (ref 12–15.9)
IMM GRANULOCYTES # BLD AUTO: 0.01 10*3/MM3 (ref 0–0.05)
IMM GRANULOCYTES NFR BLD AUTO: 0.2 % (ref 0–0.5)
IRON 24H UR-MRATE: 62 MCG/DL (ref 37–145)
IRON SATN MFR SERPL: 13 % (ref 20–50)
LYMPHOCYTES # BLD AUTO: 1.29 10*3/MM3 (ref 0.7–3.1)
LYMPHOCYTES NFR BLD AUTO: 22 % (ref 19.6–45.3)
MCH RBC QN AUTO: 30.8 PG (ref 26.6–33)
MCHC RBC AUTO-ENTMCNC: 32.3 G/DL (ref 31.5–35.7)
MCV RBC AUTO: 95.3 FL (ref 79–97)
MONOCYTES # BLD AUTO: 0.61 10*3/MM3 (ref 0.1–0.9)
MONOCYTES NFR BLD AUTO: 10.4 % (ref 5–12)
NEUTROPHILS NFR BLD AUTO: 3.84 10*3/MM3 (ref 1.7–7)
NEUTROPHILS NFR BLD AUTO: 65.5 % (ref 42.7–76)
NRBC BLD AUTO-RTO: 0 /100 WBC (ref 0–0.2)
PLATELET # BLD AUTO: 252 10*3/MM3 (ref 140–450)
PMV BLD AUTO: 12.1 FL (ref 6–12)
POTASSIUM SERPL-SCNC: 3.7 MMOL/L (ref 3.5–5.2)
PROT SERPL-MCNC: 6.7 G/DL (ref 6–8.5)
RBC # BLD AUTO: 4.25 10*6/MM3 (ref 3.77–5.28)
SODIUM SERPL-SCNC: 139 MMOL/L (ref 136–145)
TIBC SERPL-MCNC: 465 MCG/DL (ref 298–536)
TRANSFERRIN SERPL-MCNC: 312 MG/DL (ref 200–360)
VIT B12 BLD-MCNC: 280 PG/ML (ref 211–946)
WBC # BLD AUTO: 5.86 10*3/MM3 (ref 3.4–10.8)

## 2020-12-11 PROCEDURE — 82746 ASSAY OF FOLIC ACID SERUM: CPT

## 2020-12-11 PROCEDURE — 36415 COLL VENOUS BLD VENIPUNCTURE: CPT | Performed by: NURSE PRACTITIONER

## 2020-12-11 PROCEDURE — 83540 ASSAY OF IRON: CPT

## 2020-12-11 PROCEDURE — 85025 COMPLETE CBC W/AUTO DIFF WBC: CPT

## 2020-12-11 PROCEDURE — 80053 COMPREHEN METABOLIC PANEL: CPT

## 2020-12-11 PROCEDURE — 84466 ASSAY OF TRANSFERRIN: CPT

## 2020-12-11 PROCEDURE — 82728 ASSAY OF FERRITIN: CPT

## 2020-12-11 PROCEDURE — 82607 VITAMIN B-12: CPT

## 2020-12-18 ENCOUNTER — OFFICE VISIT (OUTPATIENT)
Dept: ONCOLOGY | Facility: CLINIC | Age: 60
End: 2020-12-18

## 2020-12-18 VITALS
SYSTOLIC BLOOD PRESSURE: 134 MMHG | DIASTOLIC BLOOD PRESSURE: 69 MMHG | TEMPERATURE: 98.4 F | HEIGHT: 65 IN | WEIGHT: 135 LBS | BODY MASS INDEX: 22.49 KG/M2 | HEART RATE: 87 BPM | RESPIRATION RATE: 18 BRPM

## 2020-12-18 DIAGNOSIS — C15.4 MALIGNANT NEOPLASM OF THORACIC ESOPHAGUS (HCC): Primary | ICD-10-CM

## 2020-12-18 DIAGNOSIS — D50.8 OTHER IRON DEFICIENCY ANEMIA: ICD-10-CM

## 2020-12-18 PROCEDURE — G9903 PT SCRN TBCO ID AS NON USER: HCPCS | Performed by: INTERNAL MEDICINE

## 2020-12-18 PROCEDURE — 99214 OFFICE O/P EST MOD 30 MIN: CPT | Performed by: INTERNAL MEDICINE

## 2020-12-18 PROCEDURE — G0463 HOSPITAL OUTPT CLINIC VISIT: HCPCS | Performed by: INTERNAL MEDICINE

## 2020-12-18 PROCEDURE — G8731 PAIN NEG NO PLAN: HCPCS | Performed by: INTERNAL MEDICINE

## 2020-12-18 PROCEDURE — 1123F ACP DISCUSS/DSCN MKR DOCD: CPT | Performed by: INTERNAL MEDICINE

## 2020-12-18 RX ORDER — FOLIC ACID 1 MG/1
1 TABLET ORAL DAILY
Qty: 90 TABLET | Refills: 1 | Status: SHIPPED | OUTPATIENT
Start: 2020-12-18 | End: 2021-07-29 | Stop reason: SDUPTHER

## 2020-12-18 RX ORDER — DOCUSATE SODIUM 100 MG/1
100 CAPSULE, LIQUID FILLED ORAL 2 TIMES DAILY PRN
Qty: 60 CAPSULE | Refills: 2 | Status: SHIPPED | OUTPATIENT
Start: 2020-12-18 | End: 2021-02-04

## 2020-12-18 RX ORDER — FERROUS SULFATE 325(65) MG
325 TABLET ORAL
Qty: 30 TABLET | Refills: 3 | Status: SHIPPED | OUTPATIENT
Start: 2020-12-18 | End: 2021-06-01

## 2020-12-18 RX ORDER — LANOLIN ALCOHOL/MO/W.PET/CERES
1000 CREAM (GRAM) TOPICAL DAILY
Qty: 90 TABLET | Refills: 1 | Status: SHIPPED | OUTPATIENT
Start: 2020-12-18 | End: 2021-08-05

## 2020-12-18 NOTE — PROGRESS NOTES
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  Denies any new lymph node enlargement.  Denies any recent worsening of difficulty swallowing.  Denies any bleeding.  Denies any major weight loss recently.        PAST MEDICAL HISTORY:    Past Medical History:   Diagnosis Date   • Abnormal weight loss    • Anxiety    • Atrophic vaginitis    • Depression    • Dysphagia     pharyngoesophageal phase      • Encounter for surgical aftercare following surgery of digestive system     Walter Michael (thoracic esophagectomy, lymphadenectomly, Bronchoscopy, Placement on Q. 3/1/16      • History of esophagogastroduodenoscopy     Normal hypopharynx.A tumor was found in the middle third of the esophagus.Mul.biopsies taken.Normal GE junction.Mild nonerosive gastritis in antrum.Biopsies taken.Normal pylorus and duodenum. 2015       • History of hysteroscopy     Hysteroscopy, dilation and curettage, and repair of vaginal laceration. 2013       • History of substance abuse (CMS/HCC)    • Hypertension    • Lumbosacral strain    • Myopia    • Presbyopia    • Primary malignant neoplasm of thoracic part of esophagus (CMS/HCC)     Squamous cell   • Tobacco dependence     1 ppd       SOCIAL HISTORY:    Social History     Tobacco Use   • Smoking status: Former Smoker     Types: Cigarettes     Quit date:      Years since quittin.9   • Smokeless tobacco: Never Used   • Tobacco comment: 2018 - Patient reports she utilized approximately 12 Cigarettes per day.  Patient reports she utilized tobacco productss approximately 25 - 30 years.   Substance Use Topics   • Alcohol use: No   • Drug use: No        Surgical History :  Past Surgical History:   Procedure Laterality Date   • ABDOMINAL SURGERY  03/01/2016    Abdominal portion of an Walter Michael esophagogastrectomy with jejunostomy tube placement. Esophageal carcinoma.   • COLONOSCOPY N/A 12/18/2017    Procedure: COLONOSCOPY;  Surgeon: Babar Connell MD;  Location: [facility] ENDOSCOPY;  Service:    • ENDOSCOPY N/A 2/3/2017    Procedure: ESOPHAGOGASTRODUODENOSCOPY;  Surgeon: Babar Connell MD;  Location: [facility] ENDOSCOPY;  Service:    • ENDOSCOPY N/A 5/22/2017    Procedure: ESOPHAGOGASTRODUODENOSCOPY;  Surgeon: Babar Connell MD;  Location: [facility] ENDOSCOPY;  Service:    • ENDOSCOPY N/A 9/22/2017    Procedure: ESOPHAGOGASTRODUODENOSCOPY;  Surgeon: Babar Connell MD;  Location: [facility] ENDOSCOPY;  Service:    • ENDOSCOPY N/A 12/18/2017    Procedure: ESOPHAGOGASTRODUODENOSCOPY;  Surgeon: Babar Connell MD;  Location: [facility] ENDOSCOPY;  Service:    • ENDOSCOPY N/A 6/14/2018    Procedure: ESOPHAGOGASTRODUODENOSCOPY possible dilation;  Surgeon: Babar Connell MD;  Location: [facility] ENDOSCOPY;  Service: Gastroenterology   • ENDOSCOPY N/A 12/14/2018    Procedure: ESOPHAGOGASTRODUODENOSCOPY;  Surgeon: Babar Connell MD;  Location: [facility] ENDOSCOPY;  Service: Gastroenterology   • ENDOSCOPY N/A 12/16/2019    Procedure: ESOPHAGOGASTRODUODENOSCOPY;  Surgeon: Babar Connell MD;  Location: [facility] ENDOSCOPY;  Service: Gastroenterology   • ENDOSCOPY N/A 6/3/2020    Procedure: ESOPHAGOGASTRODUODENOSCOPY;  Surgeon: Babar Connell MD;  Location: [facility] ENDOSCOPY;  Service: Gastroenterology;  Laterality: N/A;   • ESOPHAGUS SURGERY      Thoracic esophagectomy (Walter Michael type).Thoracic lymphadenectomy.Fiberoptic bronchoscopy.Placement of On Q pain pump.Jejunostomy tube placement.Pyloromyotomy. 03/01/2016      • LUMBAR DISC SURGERY     • UPPER GASTROINTESTINAL ENDOSCOPY  02/03/2017   • UPPER GASTROINTESTINAL ENDOSCOPY  05/22/2017   • UPPER GASTROINTESTINAL ENDOSCOPY   "09/22/2017   • UPPER GASTROINTESTINAL ENDOSCOPY  12/18/2017   • UPPER GASTROINTESTINAL ENDOSCOPY  06/14/2018   • UPPER GASTROINTESTINAL ENDOSCOPY  12/14/2018       ALLERGIES:    Allergies   Allergen Reactions   • Penicillins Hives   • Strawberry C [Ascorbate] Hives         FAMILY HISTORY:  Family History   Problem Relation Age of Onset   • Ovarian cancer Mother    • Cancer Mother    • Osteoporosis Other    • Heart disease Other    • Diabetes Other    • Cancer Other         Colorectal-Parent   • Asthma Other    • Asthma Sister    • Diabetes Sister    • Hypertension Sister    • Alcohol abuse Brother    • Cancer Maternal Uncle            REVIEW OF SYSTEMS:      CONSTITUTIONAL:  Complains of fatigue. Denies any fever, chills or weight loss.     EYES: No visual disturbances. No discharge. No new lesions    ENMT:  No epistaxis, mouth sores or difficulty swallowing.    RESPIRATORY:  No new shortness of breath. No new cough or hemoptysis.    CARDIOVASCULAR:  No chest pain or palpitations.    GASTROINTESTINAL: Positive for chronic intermittent nausea without vomiting.  No abdominal pain  or blood in the stool.    GENITOURINARY: No Dysuria or Hematuria.    MUSCULOSKELETAL:  No new back pain or arthralgia..    LYMPHATICS:  Denies any abnormal swollen glands anywhere in the body.    NEUROLOGICAL : No tingling or numbness. No headache or dizziness. No seizures or balance problems.    SKIN: No new skin lesions.    ENDOCRINE : No new hot or cold intolerance. No new polyuria . No polydipsia.        PHYSICAL EXAMINATION:      VITAL SIGNS:  /69   Pulse 87   Temp 98.4 °F (36.9 °C)   Resp 18   Ht 165.1 cm (65\")   Wt 61.2 kg (135 lb)   LMP 03/17/1989 (Within Months) Comment: Postmenopausal  BMI 22.47 kg/m²       12/18/20  1046   Weight: 61.2 kg (135 lb)         ECOG performance status: 2    CONSTITUTIONAL:  Not in any distress.    EYES: Mild conjunctival Pallor. No Icterus. No Pterygium. Extraocular Movements intact.No " ptosis.    ENMT:  Normocephalic, Atraumatic.No Facial Asymmetry noted.    NECK:  No adenopathy.Trachea midline. NO JVD.    RESPIRATORY:  Fair air entry bilateral. No rhonchi or wheezing.Fair respiratory effort.    CARDIOVASCULAR:  S1, S2. Regular rate and rhythm. No murmur or gallop appreciated.    ABDOMEN:  Soft, nontender. Bowel sounds present in all four quadrants.  No Hepatosplenomegaly appreciated.    MUSCULOSKELETAL:  No edema.No Calf Tenderness.Decreased range of motion.    NEUROLOGIC:    No  Motor or sensory deficit appreciated. Cranial Nerves 2-12 grossly intact.    SKIN : No new skin lesion identified. Skin is warm and moist to touch.    LYMPHATICS: No new enlarged lymph nodes in neck or supraclavicular area.    PSYCHIATRY: Alert, awake and oriented ×3.Normal affect.  Normal judgment.  Makes good eye contact.        DIAGNOSTIC DATA:    Glucose   Date Value Ref Range Status   12/11/2020 134 (H) 65 - 99 mg/dL Final     Sodium   Date Value Ref Range Status   12/11/2020 139 136 - 145 mmol/L Final     Potassium   Date Value Ref Range Status   12/11/2020 3.7 3.5 - 5.2 mmol/L Final     CO2   Date Value Ref Range Status   12/11/2020 25.0 22.0 - 29.0 mmol/L Final     Chloride   Date Value Ref Range Status   12/11/2020 104 98 - 107 mmol/L Final     Anion Gap   Date Value Ref Range Status   12/11/2020 10.0 5.0 - 15.0 mmol/L Final     Creatinine   Date Value Ref Range Status   12/11/2020 0.85 0.57 - 1.00 mg/dL Final     BUN   Date Value Ref Range Status   12/11/2020 15 8 - 23 mg/dL Final     BUN/Creatinine Ratio   Date Value Ref Range Status   12/11/2020 17.6 7.0 - 25.0 Final     Calcium   Date Value Ref Range Status   12/11/2020 9.0 8.6 - 10.5 mg/dL Final     Alkaline Phosphatase   Date Value Ref Range Status   12/11/2020 102 39 - 117 U/L Final     Total Protein   Date Value Ref Range Status   12/11/2020 6.7 6.0 - 8.5 g/dL Final     ALT (SGPT)   Date Value Ref Range Status   12/11/2020 9 1 - 33 U/L Final     AST  (SGOT)   Date Value Ref Range Status   12/11/2020 18 1 - 32 U/L Final     Total Bilirubin   Date Value Ref Range Status   12/11/2020 0.4 0.0 - 1.2 mg/dL Final     Albumin   Date Value Ref Range Status   12/11/2020 3.90 3.50 - 5.20 g/dL Final     Globulin   Date Value Ref Range Status   12/11/2020 2.8 gm/dL Final     Lab Results   Component Value Date    WBC 5.86 12/11/2020    HGB 13.1 12/11/2020    HCT 40.5 12/11/2020    MCV 95.3 12/11/2020     12/11/2020     Lab Results   Component Value Date    NEUTROABS 3.84 12/11/2020    IRON 62 12/11/2020    TIBC 465 12/11/2020    LABIRON 13 (L) 12/11/2020    FERRITIN 18.09 12/11/2020    [id number] 280 12/11/2020    FOLATE 7.71 12/11/2020     No results found for: , LABCA2, AFPTM, HCGQUANT, , CHROMGRNA, [id number], CEA, REFLABREPO      PATHOLOGY:  Pathology report from September 24, 2015 showed:  FINAL DIAGNOSIS:  A.  GASTRIC ANTRUM, MUCOSAL BIOPSY:           CHRONIC GASTRITIS.           POSITIVE FOR HELICOBACTER PYLORI.  B.  ESOPHAGEAL MASS BIOPSY:           SQUAMOUS CELL CARCINOMA, MODERATELY DIFFERENTIATED.           Pathology report from March 1, 2016 showed:  FINAL DIAGNOSIS:  A.    SEGMENT OF ESOPHAGUS AND PROXIMAL STOMACH:  STATUS POST ESOPHAGEAL MASS BIOPSY WITH A DIAGNOSIS OF       SQUAMOUS CELL CARCINOMA ([id number]) WITH SUBSEQUENT       CHEMO AND RADIATION THERAPY.  COMPLETE ABSENCE OF RESIDUAL TUMOR (EPITHELIAL STAINS       i.e.:  AE1/AE3 AND GORDY BOTH NEGATIVE).  RESECTION MARGINS FREE OF TUMOR.  B.    LEVEL 7 LYMPH NODES (2):  NO EVIDENCE OF MALIGNANCY.  C.    LEVEL 9 LYMPH NODES (2):  NO EVIDENCE OF MALIGNANCY.           RADIOLOGY DATA :  No radiology results for the last 7 days      ASSESSMENT AND PLAN:      1.  Locally squamous cell cancer of the midesophagus  -Patient was diagnosed in September 2015.  -Had a concurrent chemoradiation with carboplatin and Taxol that completed in January 2016  -Had esophagectomy done in March 1, 2016 which  showed complete pathologic response.  Patient has been on surveillance since then.  -Had a EGD done on Marta 3, 2020 which did not show any evidence of recurrence.  Patient did have stenosis that was treated.  -Patient is more than 3 years out from her diagnosis and no routine CT scan is recommended.  -We will ask patient to return to clinic in 3 months with repeat CBC, anemia work-up and CMP to be done prior to Done prior to that day.    2.  Iron deficiency: Problem is new to me  -Anemia work-up done on December 11, 2020 shows hemoglobin is 13.1.  Iron studies are consistent with developing iron deficiency with ferritin of 18 and iron saturation of 13%  -Patient states she has been taking iron intermittently for last 6 to 7 months.  Patient may have a component of iron malabsorption  -Recommend starting ferrous sulfate 1 tablet p.o. daily along with stool softener.  She was encouraged to call my office she starts having any difficulty with oral ferrous sulfate.  In that case she will benefit from intravenous iron replacement.  -B12 level is 280, will start patient on B12 1000 mcg p.o. daily.  -Folate level is 7.  We will start patient on folic acid 1 mg p.o. daily.    3.  Hypertension    4.  Health maintenance: Patient does not smoke.    5. Advance Care Planning: For now patient remains full code and is able to make decisions.  Patient has health care surrogate mentioned on chart.     6.  Prescriptions: Prescription for ferrous sulfate, B12, folic acid and Colace has been sent to her pharmacy today.            PHQ-9 Total Score:       Maddison Sheridan reports a pain score of 0.  Given her pain assessment as noted, treatment options were discussed and the following options were decided upon as a follow-up plan to address the patient's pain: No acute intervention required.         Camilo Coleman MD  12/18/2020  11:05 CST        Part of this note may be an electronic transcription/translation of spoken language  to printed text using the Dragon Dictation System.

## 2021-02-04 ENCOUNTER — OFFICE VISIT (OUTPATIENT)
Dept: GASTROENTEROLOGY | Facility: CLINIC | Age: 61
End: 2021-02-04

## 2021-02-04 VITALS
SYSTOLIC BLOOD PRESSURE: 132 MMHG | WEIGHT: 129.4 LBS | HEART RATE: 71 BPM | DIASTOLIC BLOOD PRESSURE: 91 MMHG | HEIGHT: 65 IN | BODY MASS INDEX: 21.56 KG/M2

## 2021-02-04 DIAGNOSIS — D50.8 OTHER IRON DEFICIENCY ANEMIA: ICD-10-CM

## 2021-02-04 DIAGNOSIS — R13.19 OTHER DYSPHAGIA: Primary | ICD-10-CM

## 2021-02-04 PROBLEM — D64.9 ABSOLUTE ANEMIA: Status: ACTIVE | Noted: 2021-02-04

## 2021-02-04 PROCEDURE — 99214 OFFICE O/P EST MOD 30 MIN: CPT | Performed by: INTERNAL MEDICINE

## 2021-02-04 RX ORDER — FAMOTIDINE 20 MG/1
20 TABLET, FILM COATED ORAL DAILY
Qty: 30 TABLET | Refills: 5 | Status: SHIPPED | OUTPATIENT
Start: 2021-02-04 | End: 2021-08-05

## 2021-02-04 RX ORDER — DEXTROSE AND SODIUM CHLORIDE 5; .45 G/100ML; G/100ML
30 INJECTION, SOLUTION INTRAVENOUS CONTINUOUS PRN
Status: CANCELLED | OUTPATIENT
Start: 2021-02-09

## 2021-02-04 RX ORDER — DEXLANSOPRAZOLE 60 MG/1
60 CAPSULE, DELAYED RELEASE ORAL DAILY
Qty: 30 CAPSULE | Refills: 5 | Status: SHIPPED | OUTPATIENT
Start: 2021-02-04 | End: 2021-07-02 | Stop reason: SDUPTHER

## 2021-02-04 RX ORDER — SODIUM, POTASSIUM,MAG SULFATES 17.5-3.13G
1 SOLUTION, RECONSTITUTED, ORAL ORAL EVERY 12 HOURS
Qty: 1 ML | Refills: 0 | Status: SHIPPED | OUTPATIENT
Start: 2021-02-04 | End: 2021-02-09 | Stop reason: HOSPADM

## 2021-02-04 NOTE — PROGRESS NOTES
[facility]      Chief Complaint:   Chief Complaint   Patient presents with   • Difficulty Swallowing       Subjective     HPI:   Patient with a history of esophageal cancer.  Patient been having some difficulty swallowing stating food gets stuck at times.  Patient had surgery for this and has an anastomosis that has been strictured in the past.  Patient also has been found by oncology to be anemic and has been treating with iron.  Patient not had a colonoscopy since 2017 and the prep was poor on this 1.    Plan; we will schedule patient for EGD and colonoscopy to evaluate the cause of patient's anemia and also to dilate her esophagus to improve her swallowing.    Past Medical History:   Past Medical History:   Diagnosis Date   • Abnormal weight loss    • Anxiety    • Atrophic vaginitis    • Depression    • Dysphagia     pharyngoesophageal phase      • Encounter for surgical aftercare following surgery of digestive system     Walter Michael (thoracic esophagectomy, lymphadenectomly, Bronchoscopy, Placement on Q. 3/1/16      • History of esophagogastroduodenoscopy     Normal hypopharynx.A tumor was found in the middle third of the esophagus.Mul.biopsies taken.Normal GE junction.Mild nonerosive gastritis in antrum.Biopsies taken.Normal pylorus and duodenum. 09/24/2015       • History of hysteroscopy     Hysteroscopy, dilation and curettage, and repair of vaginal laceration. 08/03/2013       • History of substance abuse (CMS/HCC)    • Hypertension    • Lumbosacral strain    • Myopia    • Presbyopia    • Primary malignant neoplasm of thoracic part of esophagus (CMS/HCC)     Squamous cell   • Tobacco dependence     1 ppd       Past Surgical History:  Past Surgical History:   Procedure Laterality Date   • ABDOMINAL SURGERY  03/01/2016    Abdominal portion of an [place] Michael esophagogastrectomy with jejunostomy tube placement. Esophageal carcinoma.   • COLONOSCOPY N/A 12/18/2017    Procedure: COLONOSCOPY;   Surgeon: Babar Connell MD;  Location: [facility] ENDOSCOPY;  Service:    • ENDOSCOPY N/A 2/3/2017    Procedure: ESOPHAGOGASTRODUODENOSCOPY;  Surgeon: Babar Connell MD;  Location: [facility] ENDOSCOPY;  Service:    • ENDOSCOPY N/A 5/22/2017    Procedure: ESOPHAGOGASTRODUODENOSCOPY;  Surgeon: Babar Connell MD;  Location: [facility] ENDOSCOPY;  Service:    • ENDOSCOPY N/A 9/22/2017    Procedure: ESOPHAGOGASTRODUODENOSCOPY;  Surgeon: Babar Connell MD;  Location: [facility] ENDOSCOPY;  Service:    • ENDOSCOPY N/A 12/18/2017    Procedure: ESOPHAGOGASTRODUODENOSCOPY;  Surgeon: Babar Connell MD;  Location: [facility] ENDOSCOPY;  Service:    • ENDOSCOPY N/A 6/14/2018    Procedure: ESOPHAGOGASTRODUODENOSCOPY possible dilation;  Surgeon: Babar Connell MD;  Location: [facility] ENDOSCOPY;  Service: Gastroenterology   • ENDOSCOPY N/A 12/14/2018    Procedure: ESOPHAGOGASTRODUODENOSCOPY;  Surgeon: Babar Connell MD;  Location: [facility] ENDOSCOPY;  Service: Gastroenterology   • ENDOSCOPY N/A 12/16/2019    Procedure: ESOPHAGOGASTRODUODENOSCOPY;  Surgeon: Babar Connell MD;  Location: [facility] ENDOSCOPY;  Service: Gastroenterology   • ENDOSCOPY N/A 6/3/2020    Procedure: ESOPHAGOGASTRODUODENOSCOPY;  Surgeon: Babar Connell MD;  Location: [facility] ENDOSCOPY;  Service: Gastroenterology;  Laterality: N/A;   • ESOPHAGUS SURGERY      Thoracic esophagectomy (Walter Michael type).Thoracic lymphadenectomy.Fiberoptic bronchoscopy.Placement of On Q pain pump.Jejunostomy tube placement.Pyloromyotomy. 03/01/2016      • LUMBAR DISC SURGERY     • UPPER GASTROINTESTINAL ENDOSCOPY  02/03/2017   • UPPER GASTROINTESTINAL ENDOSCOPY  05/22/2017   • UPPER GASTROINTESTINAL ENDOSCOPY  09/22/2017   • UPPER GASTROINTESTINAL ENDOSCOPY  12/18/2017   • UPPER GASTROINTESTINAL ENDOSCOPY  06/14/2018   • UPPER GASTROINTESTINAL ENDOSCOPY  12/14/2018       Family History:  Family History   Problem Relation Age of Onset   • Ovarian cancer Mother    • Cancer Mother    • Osteoporosis  Other    • Heart disease Other    • Diabetes Other    • Cancer Other         Colorectal-Parent   • Asthma Other    • Asthma Sister    • Diabetes Sister    • Hypertension Sister    • Alcohol abuse Brother    • Cancer Maternal Uncle        Social History:   reports that she quit smoking about 6 years ago. Her smoking use included cigarettes. She has never used smokeless tobacco. She reports that she does not drink alcohol or use drugs.    Medications:   Prior to Admission medications    Medication Sig Start Date End Date Taking? Authorizing Provider   dexlansoprazole (Dexilant) 60 MG capsule Take 1 capsule by mouth Daily. 2/4/21  Yes Babar Connell MD   famotidine (Pepcid) 20 MG tablet Take 1 tablet by mouth Daily. 2/4/21  Yes Babar Connell MD   ferrous sulfate 325 (65 FE) MG tablet Take 1 tablet by mouth Daily With Breakfast. 12/18/20  Yes Camilo Coleman MD   folic acid (FOLVITE) 1 MG tablet Take 1 tablet by mouth Daily. 12/18/20  Yes Camilo Coleman MD   vitamin B-12 (CYANOCOBALAMIN) 1000 MCG tablet Take 1 tablet by mouth Daily. 12/18/20  Yes Camilo Coleman MD   dexlansoprazole (Dexilant) 60 MG capsule Take 1 capsule by mouth Daily. 5/26/20 2/4/21 Yes Babar Connell MD   famotidine (Pepcid) 20 MG tablet Take 1 tablet by mouth Daily. 5/26/20 2/4/21 Yes Babar Connell MD   albuterol (PROVENTIL HFA;VENTOLIN HFA) 108 (90 BASE) MCG/ACT inhaler Inhale 2 puffs Every 4 (Four) Hours As Needed for wheezing. 2/17/17   Tiffany Jimenez MD   docusate sodium (COLACE) 100 MG capsule Take 1 capsule by mouth 2 (Two) Times a Day As Needed for Constipation. 12/18/20   Camilo Coleman MD   sodium-potassium-magnesium sulfates (SUPREP) 17.5-3.13-1.6 GM/177ML solution oral solution Take 1 bottle by mouth Every 12 (Twelve) Hours. 2/4/21   Babar Connell MD       Allergies:  Penicillins and Strawberry c [ascorbate]    ROS:    Review of Systems   Constitutional: Negative for activity change, appetite change, chills, diaphoresis,  "fatigue, fever and unexpected weight change.   HENT: Negative for sore throat and trouble swallowing.    Respiratory: Negative for shortness of breath.    Gastrointestinal: Negative for abdominal distention, abdominal pain, anal bleeding, blood in stool, constipation, diarrhea, nausea, rectal pain and vomiting.   Endocrine: Negative for polydipsia, polyphagia and polyuria.   Genitourinary: Negative for difficulty urinating.   Musculoskeletal: Negative for arthralgias.   Skin: Negative for pallor.   Allergic/Immunologic: Negative for food allergies.   Neurological: Negative for weakness and light-headedness.   Psychiatric/Behavioral: Negative for behavioral problems.     Objective     Blood pressure 132/91, pulse 71, height 165.1 cm (65\"), weight 58.7 kg (129 lb 6.4 oz), last menstrual period 03/17/1989, not currently breastfeeding.    Physical Exam  Constitutional:       General: She is not in acute distress.     Appearance: She is well-developed. She is not diaphoretic.   HENT:      Head: Normocephalic and atraumatic.   Cardiovascular:      Rate and Rhythm: Normal rate and regular rhythm.      Heart sounds: Normal heart sounds. No murmur. No friction rub. No gallop.    Pulmonary:      Effort: No respiratory distress.      Breath sounds: Normal breath sounds. No wheezing or rales.   Chest:      Chest wall: No tenderness.   Abdominal:      General: Bowel sounds are normal. There is no distension.      Palpations: Abdomen is soft. There is no mass.      Tenderness: There is no abdominal tenderness. There is no guarding or rebound.      Hernia: No hernia is present.   Musculoskeletal: Normal range of motion.   Skin:     General: Skin is warm and dry.      Coloration: Skin is not pale.      Findings: No erythema or rash.   Neurological:      Mental Status: She is alert and oriented to person, place, and time.   Psychiatric:         Behavior: Behavior normal.         Thought Content: Thought content normal.         " Judgment: Judgment normal.          Assessment/Plan   Diagnoses and all orders for this visit:    1. Other dysphagia (Primary)  -     Case Request; Standing  -     dextrose 5 % and sodium chloride 0.45 % infusion  -     Case Request    2. Other iron deficiency anemia  -     Case Request; Standing  -     dextrose 5 % and sodium chloride 0.45 % infusion  -     Case Request    Other orders  -     Follow Anesthesia Guidelines / Standing Orders; Future  -     Obtain Informed Consent; Future  -     Implement Anesthesia Orders Day of Procedure; Standing  -     Obtain Informed Consent; Standing  -     POC Glucose Once; Standing  -     Insert Peripheral IV; Standing  -     sodium-potassium-magnesium sulfates (SUPREP) 17.5-3.13-1.6 GM/177ML solution oral solution; Take 1 bottle by mouth Every 12 (Twelve) Hours.  Dispense: 1 mL; Refill: 0  -     dexlansoprazole (Dexilant) 60 MG capsule; Take 1 capsule by mouth Daily.  Dispense: 30 capsule; Refill: 5  -     famotidine (Pepcid) 20 MG tablet; Take 1 tablet by mouth Daily.  Dispense: 30 tablet; Refill: 5        ESOPHAGOGASTRODUODENOSCOPY (N/A), COLONOSCOPY (N/A)     Diagnosis Plan   1. Other dysphagia  Case Request    dextrose 5 % and sodium chloride 0.45 % infusion    Case Request   2. Other iron deficiency anemia  Case Request    dextrose 5 % and sodium chloride 0.45 % infusion    Case Request       Anticipated Surgical Procedure:  Orders Placed This Encounter   Procedures   • Follow Anesthesia Guidelines / Standing Orders     Standing Status:   Future   • Obtain Informed Consent     Standing Status:   Future     Order Specific Question:   Informed Consent Given For     Answer:   egd and colonoscopy       The risks, benefits, and alternatives of this procedure have been discussed with the patient or the responsible party- the patient understands and agrees to proceed.

## 2021-02-04 NOTE — PATIENT INSTRUCTIONS
"BMI for Adults  What is BMI?  Body mass index (BMI) is a number that is calculated from a person's weight and height. BMI can help estimate how much of a person's weight is composed of fat. BMI does not measure body fat directly. Rather, it is an alternative to procedures that directly measure body fat, which can be difficult and expensive.  BMI can help identify people who may be at higher risk for certain medical problems.  What are BMI measurements used for?  BMI is used as a screening tool to identify possible weight problems. It helps determine whether a person is obese, overweight, a healthy weight, or underweight.  BMI is useful for:  · Identifying a weight problem that may be related to a medical condition or may increase the risk for medical problems.  · Promoting changes, such as changes in diet and exercise, to help reach a healthy weight. BMI screening can be repeated to see if these changes are working.  How is BMI calculated?  BMI involves measuring your weight in relation to your height. Both height and weight are measured, and the BMI is calculated from those numbers. This can be done either in English (U.S[place] or metric measurements. Note that charts and online BMI calculators are available to help you find your BMI quickly and easily without having to do these calculations yourself.  To calculate your BMI in English (U.S.) measurements:    1. Measure your weight in pounds (lb).  2. Multiply the number of pounds by 703.  ? For example, for a person who weighs 180 lb, multiply that number by 703, which equals 126,540.  3. Measure your height in inches. Then multiply that number by itself to get a measurement called \"inches squared.\"  ? For example, for a person who is 70 inches tall, the \"inches squared\" measurement is 70 inches x 70 inches, which equals 4,900 inches squared.  4. Divide the total from step 2 (number of lb x 703) by the total from step 3 (inches squared): 126,540 ÷ 4,900 = 25.8. This is " "your BMI.  To calculate your BMI in metric measurements:  1. Measure your weight in kilograms (kg).  2. Measure your height in meters (m). Then multiply that number by itself to get a measurement called \"meters squared.\"  ? For example, for a person who is 1.75 m tall, the \"meters squared\" measurement is 1.75 m x 1.75 m, which is equal to 3.1 meters squared.  3. Divide the number of kilograms (your weight) by the meters squared number. In this example: 70 ÷ 3.1 = 22.6. This is your BMI.  What do the results mean?  BMI charts are used to identify whether you are underweight, normal weight, overweight, or obese. The following guidelines will be used:  · Underweight: BMI less than 18.5.  · Normal weight: BMI between 18.5 and 24.9.  · Overweight: BMI between 25 and 29.9.  · Obese: BMI of 30 or above.  Keep these notes in mind:  · Weight includes both fat and muscle, so someone with a muscular build, such as an athlete, may have a BMI that is higher than 24.9. In cases like these, BMI is not an accurate measure of body fat.  · To determine if excess body fat is the cause of a BMI of 25 or higher, further assessments may need to be done by a health care provider.  · BMI is usually interpreted in the same way for men and women.  Where to find more information  For more information about BMI, including tools to quickly calculate your BMI, go to these websites:  · Centers for Disease Control and Prevention: www.cdc.gov  · American Heart Association: www.heart.org  · National Heart, Lung, and Blood [place]: www.nhlbi.nih.gov  Summary  · Body mass index (BMI) is a number that is calculated from a person's weight and height.  · BMI may help estimate how much of a person's weight is composed of fat. BMI can help identify those who may be at higher risk for certain medical problems.  · BMI can be measured using English measurements or metric measurements.  · BMI charts are used to identify whether you are underweight, normal " weight, overweight, or obese.  This information is not intended to replace advice given to you by your health care provider. Make sure you discuss any questions you have with your health care provider.  Document Revised: 09/09/2020 Document Reviewed: 07/17/2020  Elsevier Patient Education © 2020 [employer] Inc.

## 2021-02-04 NOTE — H&P (VIEW-ONLY)
[facility], operated by [facility]      Chief Complaint:   Chief Complaint   Patient presents with   • Difficulty Swallowing       Subjective     HPI:   Patient with a history of esophageal cancer.  Patient been having some difficulty swallowing stating food gets stuck at times.  Patient had surgery for this and has an anastomosis that has been strictured in the past.  Patient also has been found by oncology to be anemic and has been treating with iron.  Patient not had a colonoscopy since 2017 and the prep was poor on this 1.    Plan; we will schedule patient for EGD and colonoscopy to evaluate the cause of patient's anemia and also to dilate her esophagus to improve her swallowing.    Past Medical History:   Past Medical History:   Diagnosis Date   • Abnormal weight loss    • Anxiety    • Atrophic vaginitis    • Depression    • Dysphagia     pharyngoesophageal phase      • Encounter for surgical aftercare following surgery of digestive system     Walter Michael (thoracic esophagectomy, lymphadenectomly, Bronchoscopy, Placement on Q. 3/1/16      • History of esophagogastroduodenoscopy     Normal hypopharynx.A tumor was found in the middle third of the esophagus.Mul.biopsies taken.Normal GE junction.Mild nonerosive gastritis in antrum.Biopsies taken.Normal pylorus and duodenum. 09/24/2015       • History of hysteroscopy     Hysteroscopy, dilation and curettage, and repair of vaginal laceration. 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 Esophageal carcinoma.   • COLONOSCOPY N/A 12/18/2017    Procedure: COLONOSCOPY;   Surgeon: Babar Connell MD;  Location: [facility] ENDOSCOPY;  Service:    • ENDOSCOPY N/A 2/3/2017    Procedure: ESOPHAGOGASTRODUODENOSCOPY;  Surgeon: Babar Connell MD;  Location: [facility] ENDOSCOPY;  Service:    • ENDOSCOPY N/A 5/22/2017    Procedure: ESOPHAGOGASTRODUODENOSCOPY;  Surgeon: Babar Connell MD;  Location: [facility] ENDOSCOPY;  Service:    • ENDOSCOPY N/A 9/22/2017    Procedure: ESOPHAGOGASTRODUODENOSCOPY;  Surgeon: Babar Connell MD;  Location: [facility] ENDOSCOPY;  Service:    • ENDOSCOPY N/A 12/18/2017    Procedure: ESOPHAGOGASTRODUODENOSCOPY;  Surgeon: Babar Connell MD;  Location: [facility] ENDOSCOPY;  Service:    • ENDOSCOPY N/A 6/14/2018    Procedure: ESOPHAGOGASTRODUODENOSCOPY possible dilation;  Surgeon: Babar Connell MD;  Location: [facility] ENDOSCOPY;  Service: Gastroenterology   • ENDOSCOPY N/A 12/14/2018    Procedure: ESOPHAGOGASTRODUODENOSCOPY;  Surgeon: Babar Connell MD;  Location: [facility] ENDOSCOPY;  Service: Gastroenterology   • ENDOSCOPY N/A 12/16/2019    Procedure: ESOPHAGOGASTRODUODENOSCOPY;  Surgeon: Babar Connell MD;  Location: [facility] ENDOSCOPY;  Service: Gastroenterology   • ENDOSCOPY N/A 6/3/2020    Procedure: ESOPHAGOGASTRODUODENOSCOPY;  Surgeon: Babar Connell MD;  Location: [facility] ENDOSCOPY;  Service: Gastroenterology;  Laterality: N/A;   • ESOPHAGUS SURGERY      Thoracic esophagectomy (Walter Michael type).Thoracic lymphadenectomy.Fiberoptic bronchoscopy.Placement of On Q pain pump.Jejunostomy tube placement.Pyloromyotomy. 03/01/2016      • LUMBAR DISC SURGERY     • UPPER GASTROINTESTINAL ENDOSCOPY  02/03/2017   • UPPER GASTROINTESTINAL ENDOSCOPY  05/22/2017   • UPPER GASTROINTESTINAL ENDOSCOPY  09/22/2017   • UPPER GASTROINTESTINAL ENDOSCOPY  12/18/2017   • UPPER GASTROINTESTINAL ENDOSCOPY  06/14/2018   • UPPER GASTROINTESTINAL ENDOSCOPY  12/14/2018       Family History:  Family History   Problem Relation Age of Onset   • Ovarian cancer Mother    • Cancer Mother    • Osteoporosis  Other    • Heart disease Other    • Diabetes Other    • Cancer Other         Colorectal-Parent   • Asthma Other    • Asthma Sister    • Diabetes Sister    • Hypertension Sister    • Alcohol abuse Brother    • Cancer Maternal Uncle        Social History:   reports that she quit smoking about 6 years ago. Her smoking use included cigarettes. She has never used smokeless tobacco. She reports that she does not drink alcohol or use drugs.    Medications:   Prior to Admission medications    Medication Sig Start Date End Date Taking? Authorizing Provider   dexlansoprazole (Dexilant) 60 MG capsule Take 1 capsule by mouth Daily. 2/4/21  Yes Babar Connell MD   famotidine (Pepcid) 20 MG tablet Take 1 tablet by mouth Daily. 2/4/21  Yes Babar Connell MD   ferrous sulfate 325 (65 FE) MG tablet Take 1 tablet by mouth Daily With Breakfast. 12/18/20  Yes Camilo Coleman MD   folic acid (FOLVITE) 1 MG tablet Take 1 tablet by mouth Daily. 12/18/20  Yes Camilo Coleman MD   vitamin B-12 (CYANOCOBALAMIN) 1000 MCG tablet Take 1 tablet by mouth Daily. 12/18/20  Yes Camilo Coleman MD   dexlansoprazole (Dexilant) 60 MG capsule Take 1 capsule by mouth Daily. 5/26/20 2/4/21 Yes Babar Connell MD   famotidine (Pepcid) 20 MG tablet Take 1 tablet by mouth Daily. 5/26/20 2/4/21 Yes Babar Connell MD   albuterol (PROVENTIL HFA;VENTOLIN HFA) 108 (90 BASE) MCG/ACT inhaler Inhale 2 puffs Every 4 (Four) Hours As Needed for wheezing. 2/17/17   Tiffany Jimenez MD   docusate sodium (COLACE) 100 MG capsule Take 1 capsule by mouth 2 (Two) Times a Day As Needed for Constipation. 12/18/20   Camilo Coleman MD   sodium-potassium-magnesium sulfates (SUPREP) 17.5-3.13-1.6 GM/177ML solution oral solution Take 1 bottle by mouth Every 12 (Twelve) Hours. 2/4/21   Babar Connell MD       Allergies:  Penicillins and Strawberry c [ascorbate]    ROS:    Review of Systems   Constitutional: Negative for activity change, appetite change, chills, diaphoresis,  "fatigue, fever and unexpected weight change.   HENT: Negative for sore throat and trouble swallowing.    Respiratory: Negative for shortness of breath.    Gastrointestinal: Negative for abdominal distention, abdominal pain, anal bleeding, blood in stool, constipation, diarrhea, nausea, rectal pain and vomiting.   Endocrine: Negative for polydipsia, polyphagia and polyuria.   Genitourinary: Negative for difficulty urinating.   Musculoskeletal: Negative for arthralgias.   Skin: Negative for pallor.   Allergic/Immunologic: Negative for food allergies.   Neurological: Negative for weakness and light-headedness.   Psychiatric/Behavioral: Negative for behavioral problems.     Objective     Blood pressure 132/91, pulse 71, height 165.1 cm (65\"), weight 58.7 kg (129 lb 6.4 oz), last menstrual period 03/17/1989, not currently breastfeeding.    Physical Exam  Constitutional:       General: She is not in acute distress.     Appearance: She is well-developed. She is not diaphoretic.   HENT:      Head: Normocephalic and atraumatic.   Cardiovascular:      Rate and Rhythm: Normal rate and regular rhythm.      Heart sounds: Normal heart sounds. No murmur. No friction rub. No gallop.    Pulmonary:      Effort: No respiratory distress.      Breath sounds: Normal breath sounds. No wheezing or rales.   Chest:      Chest wall: No tenderness.   Abdominal:      General: Bowel sounds are normal. There is no distension.      Palpations: Abdomen is soft. There is no mass.      Tenderness: There is no abdominal tenderness. There is no guarding or rebound.      Hernia: No hernia is present.   Musculoskeletal: Normal range of motion.   Skin:     General: Skin is warm and dry.      Coloration: Skin is not pale.      Findings: No erythema or rash.   Neurological:      Mental Status: She is alert and oriented to person, place, and time.   Psychiatric:         Behavior: Behavior normal.         Thought Content: Thought content normal.         " Judgment: Judgment normal.          Assessment/Plan   Diagnoses and all orders for this visit:    1. Other dysphagia (Primary)  -     Case Request; Standing  -     dextrose 5 % and sodium chloride 0.45 % infusion  -     Case Request    2. Other iron deficiency anemia  -     Case Request; Standing  -     dextrose 5 % and sodium chloride 0.45 % infusion  -     Case Request    Other orders  -     Follow Anesthesia Guidelines / Standing Orders; Future  -     Obtain Informed Consent; Future  -     Implement Anesthesia Orders Day of Procedure; Standing  -     Obtain Informed Consent; Standing  -     POC Glucose Once; Standing  -     Insert Peripheral IV; Standing  -     sodium-potassium-magnesium sulfates (SUPREP) 17.5-3.13-1.6 GM/177ML solution oral solution; Take 1 bottle by mouth Every 12 (Twelve) Hours.  Dispense: 1 mL; Refill: 0  -     dexlansoprazole (Dexilant) 60 MG capsule; Take 1 capsule by mouth Daily.  Dispense: 30 capsule; Refill: 5  -     famotidine (Pepcid) 20 MG tablet; Take 1 tablet by mouth Daily.  Dispense: 30 tablet; Refill: 5        ESOPHAGOGASTRODUODENOSCOPY (N/A), COLONOSCOPY (N/A)     Diagnosis Plan   1. Other dysphagia  Case Request    dextrose 5 % and sodium chloride 0.45 % infusion    Case Request   2. Other iron deficiency anemia  Case Request    dextrose 5 % and sodium chloride 0.45 % infusion    Case Request       Anticipated Surgical Procedure:  Orders Placed This Encounter   Procedures   • Follow Anesthesia Guidelines / Standing Orders     Standing Status:   Future   • Obtain Informed Consent     Standing Status:   Future     Order Specific Question:   Informed Consent Given For     Answer:   egd and colonoscopy       The risks, benefits, and alternatives of this procedure have been discussed with the patient or the responsible party- the patient understands and agrees to proceed.

## 2021-02-06 ENCOUNTER — LAB (OUTPATIENT)
Dept: LAB | Facility: HOSPITAL | Age: 61
End: 2021-02-06

## 2021-02-06 DIAGNOSIS — Z01.818 PREOP TESTING: Primary | ICD-10-CM

## 2021-02-06 PROCEDURE — U0004 COV-19 TEST NON-CDC HGH THRU: HCPCS

## 2021-02-06 PROCEDURE — C9803 HOPD COVID-19 SPEC COLLECT: HCPCS

## 2021-02-07 LAB — SARS-COV-2 ORF1AB RESP QL NAA+PROBE: NOT DETECTED

## 2021-02-09 ENCOUNTER — ANESTHESIA EVENT (OUTPATIENT)
Dept: GASTROENTEROLOGY | Facility: HOSPITAL | Age: 61
End: 2021-02-09

## 2021-02-09 ENCOUNTER — ANESTHESIA (OUTPATIENT)
Dept: GASTROENTEROLOGY | Facility: HOSPITAL | Age: 61
End: 2021-02-09

## 2021-02-09 ENCOUNTER — HOSPITAL ENCOUNTER (OUTPATIENT)
Facility: HOSPITAL | Age: 61
Setting detail: HOSPITAL OUTPATIENT SURGERY
Discharge: HOME | End: 2021-02-09
Attending: INTERNAL MEDICINE | Admitting: INTERNAL MEDICINE

## 2021-02-09 VITALS
RESPIRATION RATE: 18 BRPM | TEMPERATURE: 97 F | BODY MASS INDEX: 23.67 KG/M2 | OXYGEN SATURATION: 98 % | WEIGHT: 133.6 LBS | DIASTOLIC BLOOD PRESSURE: 74 MMHG | HEART RATE: 85 BPM | SYSTOLIC BLOOD PRESSURE: 116 MMHG | HEIGHT: 63 IN

## 2021-02-09 DIAGNOSIS — D50.8 OTHER IRON DEFICIENCY ANEMIA: ICD-10-CM

## 2021-02-09 DIAGNOSIS — R19.7 DIARRHEA OF PRESUMED INFECTIOUS ORIGIN: Primary | ICD-10-CM

## 2021-02-09 DIAGNOSIS — R13.19 OTHER DYSPHAGIA: ICD-10-CM

## 2021-02-09 PROCEDURE — 43239 EGD BIOPSY SINGLE/MULTIPLE: CPT | Performed by: INTERNAL MEDICINE

## 2021-02-09 PROCEDURE — C1726 CATH, BAL DIL, NON-VASCULAR: HCPCS | Performed by: INTERNAL MEDICINE

## 2021-02-09 PROCEDURE — 88305 TISSUE EXAM BY PATHOLOGIST: CPT

## 2021-02-09 PROCEDURE — 25010000002 PROPOFOL 10 MG/ML EMULSION: Performed by: NURSE ANESTHETIST, CERTIFIED REGISTERED

## 2021-02-09 PROCEDURE — 43249 ESOPH EGD DILATION <30 MM: CPT | Performed by: INTERNAL MEDICINE

## 2021-02-09 RX ORDER — DEXTROSE AND SODIUM CHLORIDE 5; .45 G/100ML; G/100ML
30 INJECTION, SOLUTION INTRAVENOUS CONTINUOUS PRN
Status: DISCONTINUED | OUTPATIENT
Start: 2021-02-09 | End: 2021-02-09 | Stop reason: HOSPADM

## 2021-02-09 RX ORDER — DEXTROSE AND SODIUM CHLORIDE 5; .45 G/100ML; G/100ML
30 INJECTION, SOLUTION INTRAVENOUS CONTINUOUS PRN
Status: CANCELLED | OUTPATIENT
Start: 2021-02-09

## 2021-02-09 RX ORDER — PROPOFOL 10 MG/ML
VIAL (ML) INTRAVENOUS AS NEEDED
Status: DISCONTINUED | OUTPATIENT
Start: 2021-02-09 | End: 2021-02-09 | Stop reason: SURG

## 2021-02-09 RX ORDER — LIDOCAINE HYDROCHLORIDE 20 MG/ML
INJECTION, SOLUTION EPIDURAL; INFILTRATION; INTRACAUDAL; PERINEURAL AS NEEDED
Status: DISCONTINUED | OUTPATIENT
Start: 2021-02-09 | End: 2021-02-09 | Stop reason: SURG

## 2021-02-09 RX ADMIN — PROPOFOL 30 MG: 10 INJECTION, EMULSION INTRAVENOUS at 14:15

## 2021-02-09 RX ADMIN — PROPOFOL 120 MG: 10 INJECTION, EMULSION INTRAVENOUS at 14:04

## 2021-02-09 RX ADMIN — PROPOFOL 50 MG: 10 INJECTION, EMULSION INTRAVENOUS at 14:11

## 2021-02-09 RX ADMIN — PROPOFOL 30 MG: 10 INJECTION, EMULSION INTRAVENOUS at 14:13

## 2021-02-09 RX ADMIN — DEXTROSE AND SODIUM CHLORIDE 30 ML/HR: 5; 450 INJECTION, SOLUTION INTRAVENOUS at 13:43

## 2021-02-09 RX ADMIN — PROPOFOL 40 MG: 10 INJECTION, EMULSION INTRAVENOUS at 14:08

## 2021-02-09 RX ADMIN — PROPOFOL 40 MG: 10 INJECTION, EMULSION INTRAVENOUS at 14:06

## 2021-02-09 RX ADMIN — LIDOCAINE HYDROCHLORIDE 60 MG: 20 INJECTION, SOLUTION EPIDURAL; INFILTRATION; INTRACAUDAL; PERINEURAL at 14:04

## 2021-02-09 NOTE — ANESTHESIA PREPROCEDURE EVALUATION
Anesthesia Evaluation     Patient summary reviewed and Nursing notes reviewed   NPO Solid Status: > 8 hours  NPO Liquid Status: > 2 hours           Airway   Mallampati: III  TM distance: >3 FB  No difficulty expected  Dental - normal exam     Pulmonary - normal exam   (+) COPD, shortness of breath,   Cardiovascular - normal exam    (+) hypertension,       Neuro/Psych  (+) psychiatric history,     GI/Hepatic/Renal/Endo    (+)  GERD,      Musculoskeletal (-) negative ROS    Abdominal    Substance History - negative use     OB/GYN negative ob/gyn ROS         Other      history of cancer                      Anesthesia Plan    ASA 3     MAC     intravenous induction     Anesthetic plan, all risks, benefits, and alternatives have been provided, discussed and informed consent has been obtained with: patient.

## 2021-02-09 NOTE — ANESTHESIA POSTPROCEDURE EVALUATION
Patient: Maddison Sheridan    Procedure Summary     Date: 02/09/21 Room / Location: [facility] ENDOSCOPY 3 / [facility] ENDOSCOPY    Anesthesia Start: 1402 Anesthesia Stop: 1417    Procedures:       ESOPHAGOGASTRODUODENOSCOPY (N/A )      COLONOSCOPY (N/A ) Diagnosis:       Other dysphagia      Other iron deficiency anemia      (Other dysphagia [R13.19])      (Other iron deficiency anemia [D50.8])    Surgeon: Babar Connell MD Provider: Kavon Briseno CRNA    Anesthesia Type: MAC ASA Status: 3          Anesthesia Type: MAC    Vitals  No vitals data found for the desired time range.          Post Anesthesia Care and Evaluation    Patient location during evaluation: bedside  Patient participation: complete - patient cannot participate  Level of consciousness: sleepy but conscious  Pain management: adequate  Airway patency: patent  Anesthetic complications: No anesthetic complications  PONV Status: none  Cardiovascular status: acceptable  Respiratory status: acceptable  Hydration status: acceptable    Comments: HR:80  SPO2: 100  NIBP: 123/70  RR:18    No anesthesia care post op

## 2021-02-09 NOTE — NURSING NOTE
Pt states she ate solid food yesterday and her colonoscopy prep did not clean her out well so she wishes to reschedule colonoscopy. Procedure rescheduled for March 3. Spoke with gastro office and informed them patient would need another colonoscopy prep called in to her pharmacy.

## 2021-02-12 LAB
LAB AP CASE REPORT: NORMAL
PATH REPORT.FINAL DX SPEC: NORMAL

## 2021-03-19 ENCOUNTER — APPOINTMENT (OUTPATIENT)
Dept: ONCOLOGY | Facility: CLINIC | Age: 61
End: 2021-03-19

## 2021-03-19 ENCOUNTER — APPOINTMENT (OUTPATIENT)
Dept: ONCOLOGY | Facility: HOSPITAL | Age: 61
End: 2021-03-19

## 2021-03-29 ENCOUNTER — APPOINTMENT (OUTPATIENT)
Dept: ONCOLOGY | Facility: CLINIC | Age: 61
End: 2021-03-29

## 2021-03-29 ENCOUNTER — APPOINTMENT (OUTPATIENT)
Dept: ONCOLOGY | Facility: HOSPITAL | Age: 61
End: 2021-03-29

## 2021-04-01 ENCOUNTER — LAB (OUTPATIENT)
Dept: ONCOLOGY | Facility: HOSPITAL | Age: 61
End: 2021-04-01

## 2021-04-01 ENCOUNTER — OFFICE VISIT (OUTPATIENT)
Dept: ONCOLOGY | Facility: CLINIC | Age: 61
End: 2021-04-01

## 2021-04-01 VITALS
RESPIRATION RATE: 20 BRPM | HEIGHT: 63 IN | TEMPERATURE: 97.2 F | WEIGHT: 127.2 LBS | DIASTOLIC BLOOD PRESSURE: 86 MMHG | HEART RATE: 78 BPM | SYSTOLIC BLOOD PRESSURE: 142 MMHG | BODY MASS INDEX: 22.54 KG/M2

## 2021-04-01 DIAGNOSIS — D50.8 OTHER IRON DEFICIENCY ANEMIA: ICD-10-CM

## 2021-04-01 DIAGNOSIS — C15.4 MALIGNANT NEOPLASM OF THORACIC ESOPHAGUS (HCC): ICD-10-CM

## 2021-04-01 DIAGNOSIS — N17.9 ACUTE KIDNEY INJURY (HCC): ICD-10-CM

## 2021-04-01 DIAGNOSIS — C15.4 MALIGNANT NEOPLASM OF THORACIC ESOPHAGUS (HCC): Primary | Chronic | ICD-10-CM

## 2021-04-01 DIAGNOSIS — E61.1 IRON DEFICIENCY: Chronic | ICD-10-CM

## 2021-04-01 LAB
ALBUMIN SERPL-MCNC: 3.8 G/DL (ref 3.5–5.2)
ALBUMIN/GLOB SERPL: 1.3 G/DL
ALP SERPL-CCNC: 112 U/L (ref 39–117)
ALT SERPL W P-5'-P-CCNC: 14 U/L (ref 1–33)
ANION GAP SERPL CALCULATED.3IONS-SCNC: 9 MMOL/L (ref 5–15)
AST SERPL-CCNC: 19 U/L (ref 1–32)
BASOPHILS # BLD AUTO: 0.03 10*3/MM3 (ref 0–0.2)
BASOPHILS NFR BLD AUTO: 0.5 % (ref 0–1.5)
BILIRUB SERPL-MCNC: 0.7 MG/DL (ref 0–1.2)
BUN SERPL-MCNC: 9 MG/DL (ref 8–23)
BUN/CREAT SERPL: 8.9 (ref 7–25)
CALCIUM SPEC-SCNC: 8.9 MG/DL (ref 8.6–10.5)
CHLORIDE SERPL-SCNC: 105 MMOL/L (ref 98–107)
CO2 SERPL-SCNC: 26 MMOL/L (ref 22–29)
CREAT SERPL-MCNC: 1.01 MG/DL (ref 0.57–1)
DEPRECATED RDW RBC AUTO: 48.9 FL (ref 37–54)
EOSINOPHIL # BLD AUTO: 0.05 10*3/MM3 (ref 0–0.4)
EOSINOPHIL NFR BLD AUTO: 0.9 % (ref 0.3–6.2)
ERYTHROCYTE [DISTWIDTH] IN BLOOD BY AUTOMATED COUNT: 13.5 % (ref 12.3–15.4)
FERRITIN SERPL-MCNC: 55.18 NG/ML (ref 13–150)
FOLATE SERPL-MCNC: >20 NG/ML (ref 4.78–24.2)
GFR SERPL CREATININE-BSD FRML MDRD: 67 ML/MIN/1.73
GLOBULIN UR ELPH-MCNC: 2.9 GM/DL
GLUCOSE SERPL-MCNC: 113 MG/DL (ref 65–99)
HCT VFR BLD AUTO: 43.2 % (ref 34–46.6)
HGB BLD-MCNC: 14 G/DL (ref 12–15.9)
IMM GRANULOCYTES # BLD AUTO: 0.02 10*3/MM3 (ref 0–0.05)
IMM GRANULOCYTES NFR BLD AUTO: 0.4 % (ref 0–0.5)
IRON 24H UR-MRATE: 96 MCG/DL (ref 37–145)
IRON SATN MFR SERPL: 24 % (ref 20–50)
LYMPHOCYTES # BLD AUTO: 1.05 10*3/MM3 (ref 0.7–3.1)
LYMPHOCYTES NFR BLD AUTO: 18.5 % (ref 19.6–45.3)
MCH RBC QN AUTO: 31.6 PG (ref 26.6–33)
MCHC RBC AUTO-ENTMCNC: 32.4 G/DL (ref 31.5–35.7)
MCV RBC AUTO: 97.5 FL (ref 79–97)
MONOCYTES # BLD AUTO: 0.59 10*3/MM3 (ref 0.1–0.9)
MONOCYTES NFR BLD AUTO: 10.4 % (ref 5–12)
NEUTROPHILS NFR BLD AUTO: 3.95 10*3/MM3 (ref 1.7–7)
NEUTROPHILS NFR BLD AUTO: 69.3 % (ref 42.7–76)
NRBC BLD AUTO-RTO: 0 /100 WBC (ref 0–0.2)
PLATELET # BLD AUTO: 226 10*3/MM3 (ref 140–450)
PMV BLD AUTO: 11.9 FL (ref 6–12)
POTASSIUM SERPL-SCNC: 3.7 MMOL/L (ref 3.5–5.2)
PROT SERPL-MCNC: 6.7 G/DL (ref 6–8.5)
RBC # BLD AUTO: 4.43 10*6/MM3 (ref 3.77–5.28)
SODIUM SERPL-SCNC: 140 MMOL/L (ref 136–145)
TIBC SERPL-MCNC: 395 MCG/DL (ref 298–536)
TRANSFERRIN SERPL-MCNC: 265 MG/DL (ref 200–360)
VIT B12 BLD-MCNC: 853 PG/ML (ref 211–946)
WBC # BLD AUTO: 5.69 10*3/MM3 (ref 3.4–10.8)

## 2021-04-01 PROCEDURE — G0463 HOSPITAL OUTPT CLINIC VISIT: HCPCS | Performed by: INTERNAL MEDICINE

## 2021-04-01 PROCEDURE — 82607 VITAMIN B-12: CPT

## 2021-04-01 PROCEDURE — 1125F AMNT PAIN NOTED PAIN PRSNT: CPT | Performed by: INTERNAL MEDICINE

## 2021-04-01 PROCEDURE — 85025 COMPLETE CBC W/AUTO DIFF WBC: CPT

## 2021-04-01 PROCEDURE — 83540 ASSAY OF IRON: CPT

## 2021-04-01 PROCEDURE — 84466 ASSAY OF TRANSFERRIN: CPT

## 2021-04-01 PROCEDURE — 80053 COMPREHEN METABOLIC PANEL: CPT

## 2021-04-01 PROCEDURE — 82728 ASSAY OF FERRITIN: CPT

## 2021-04-01 PROCEDURE — 99214 OFFICE O/P EST MOD 30 MIN: CPT | Performed by: INTERNAL MEDICINE

## 2021-04-01 PROCEDURE — 1123F ACP DISCUSS/DSCN MKR DOCD: CPT | Performed by: INTERNAL MEDICINE

## 2021-04-01 PROCEDURE — 82746 ASSAY OF FOLIC ACID SERUM: CPT

## 2021-04-01 PROCEDURE — G9903 PT SCRN TBCO ID AS NON USER: HCPCS | Performed by: INTERNAL MEDICINE

## 2021-04-01 NOTE — PROGRESS NOTES
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  Denies any new lymph node enlargement.  Denies any new onset of difficulty swallowing.  Denies any bleeding.  Denies any major weight loss recently.          Past Medical History, Past Surgical History, Social History, Family History have been reviewed and are without significant changes except as mentioned.    Review of Systems   Constitutional: Positive for fatigue.        Has lost 8 pounds since last clinic visit.  States her appetite is good.   HENT: Positive for trouble swallowing (Lines of chronic difficulty with swallowing due to esophageal stenosis).    Gastrointestinal: Positive for nausea and vomiting.   Musculoskeletal:        Complains of upper back pain secondary to muscular sprain that started earlier today.      A comprehensive 14 point review of systems was performed and was negative except as mentioned.    Medications:  The current medication list was reviewed in the EMR    ALLERGIES:    Allergies   Allergen Reactions   • Penicillins Hives   • Strawberry C [Ascorbate] Hives       Objective      Vitals:    04/01/21 1020   BP: 142/86   Pulse: 78   Resp: 20   Temp: 97.2 °F (36.2 °C)   TempSrc: Temporal   Weight: 57.7 kg (127 lb 3.2 oz)   Height: 160 cm (62.99\")   PainSc:   9   PainLoc: Back     Current Status 4/1/2021   ECOG score 0       Physical Exam  Cardiovascular:      Rate and Rhythm: Normal rate and regular rhythm.   Pulmonary:      Breath sounds: Normal breath sounds. No wheezing or rhonchi.   Abdominal:      General: Bowel sounds are normal.      Palpations: Abdomen is soft.      Tenderness: There is no abdominal tenderness. "   Neurological:      Mental Status: She is alert and oriented to person, place, and time.           RECENT LABS:  Glucose   Date Value Ref Range Status   04/01/2021 113 (H) 65 - 99 mg/dL Final     Sodium   Date Value Ref Range Status   04/01/2021 140 136 - 145 mmol/L Final     Potassium   Date Value Ref Range Status   04/01/2021 3.7 3.5 - 5.2 mmol/L Final     CO2   Date Value Ref Range Status   04/01/2021 26.0 22.0 - 29.0 mmol/L Final     Chloride   Date Value Ref Range Status   04/01/2021 105 98 - 107 mmol/L Final     Anion Gap   Date Value Ref Range Status   04/01/2021 9.0 5.0 - 15.0 mmol/L Final     Creatinine   Date Value Ref Range Status   04/01/2021 1.01 (H) 0.57 - 1.00 mg/dL Final     BUN   Date Value Ref Range Status   04/01/2021 9 8 - 23 mg/dL Final     BUN/Creatinine Ratio   Date Value Ref Range Status   04/01/2021 8.9 7.0 - 25.0 Final     Calcium   Date Value Ref Range Status   04/01/2021 8.9 8.6 - 10.5 mg/dL Final     Alkaline Phosphatase   Date Value Ref Range Status   04/01/2021 112 39 - 117 U/L Final     Total Protein   Date Value Ref Range Status   04/01/2021 6.7 6.0 - 8.5 g/dL Final     ALT (SGPT)   Date Value Ref Range Status   04/01/2021 14 1 - 33 U/L Final     AST (SGOT)   Date Value Ref Range Status   04/01/2021 19 1 - 32 U/L Final     Total Bilirubin   Date Value Ref Range Status   04/01/2021 0.7 0.0 - 1.2 mg/dL Final     Albumin   Date Value Ref Range Status   04/01/2021 3.80 3.50 - 5.20 g/dL Final     Globulin   Date Value Ref Range Status   04/01/2021 2.9 gm/dL Final     Lab Results   Component Value Date    WBC 5.69 04/01/2021    HGB 14.0 04/01/2021    HCT 43.2 04/01/2021    MCV 97.5 (H) 04/01/2021     04/01/2021     Lab Results   Component Value Date    NEUTROABS 3.95 04/01/2021    IRON 96 04/01/2021    IRON 62 12/11/2020    TIBC 395 04/01/2021    TIBC 465 12/11/2020    LABIRON 24 04/01/2021    LABIRON 13 (L) 12/11/2020    FERRITIN 55.18 04/01/2021    FERRITIN 18.09 12/11/2020     [id number] 853 04/01/2021    [id number] 280 12/11/2020    FOLATE >20.00 04/01/2021    FOLATE 7.71 12/11/2020     No results found for: , LABCA2, AFPTM, HCGQUANT, , CHROMGRNA, [id number], CEA, REFLABREPO    PATHOLOGY:  Pathology report from September 24, 2015 showed:  FINAL DIAGNOSIS:  A.  GASTRIC ANTRUM, MUCOSAL BIOPSY:           CHRONIC GASTRITIS.           POSITIVE FOR HELICOBACTER PYLORI.  B.  ESOPHAGEAL MASS BIOPSY:           SQUAMOUS CELL CARCINOMA, MODERATELY DIFFERENTIATED.           Pathology report from March 1, 2016 showed:  FINAL DIAGNOSIS:  A.    SEGMENT OF ESOPHAGUS AND PROXIMAL STOMACH:  STATUS POST ESOPHAGEAL MASS BIOPSY WITH A DIAGNOSIS OF       SQUAMOUS CELL CARCINOMA ([id number]) WITH SUBSEQUENT       CHEMO AND RADIATION THERAPY.  COMPLETE ABSENCE OF RESIDUAL TUMOR (EPITHELIAL STAINS       i.e.:  AE1/AE3 AND GORDY BOTH NEGATIVE).  RESECTION MARGINS FREE OF TUMOR.  B.    LEVEL 7 LYMPH NODES (2):  NO EVIDENCE OF MALIGNANCY.  C.    LEVEL 9 LYMPH NODES (2):  NO EVIDENCE OF MALIGNANCY.          RADIOLOGY DATA :  No radiology results for the last 7 days        Assessment/Plan     1.  Squamous cell cancer of midesophagus:  -Patient was diagnosed in September 2015  -Had a concurrent chemoradiation with carboplatin and Taxol that completed in January 2016  -Had esophagectomy done on March 1, 2016 that showed complete pathologic response.  Patient has been on surveillance since then  -EGD on Marta 3, 2020 did not show any evidence of recurrence.  -Since patient is more than 3 years out from her diagnosis, no routine CT scan has been recommended as per NCCN guideline  -We will ask patient to return to clinic in 3 months with repeat CBC, CMP, iron studies, ferritin, B12 and folate to be done on that date.    2.  Iron deficiency:  -Patient was found to have iron deficiency in December 2020.  For which patient has been started on ferrous sulfate 1 tablet p.o. daily along with B12 and folic acid  daily  -Anemia work-up done today shows improvement in iron, B12 and folate.  Recommend continue with ferrous sulfate 1 tablet p.o. daily, B12 and folic acid.    3.  Acute kidney injury:  -Creatinine is elevated at 1.01.  Patient will be notified about elevated creatinine need to increase hydration.    4.  Hypertension    5.  Health maintenance: Patient does not smoke.    6. Advance Care Planning: For now patient remains full code and is able to make decisions.  Patient has health care surrogate mentioned on chart.                 PHQ-9 Total Score: 0   -Patient is not homicidal or suicidal.  No acute intervention required.    Maddison Sheridan reports a pain score of 9.  Given her pain assessment as noted, treatment options were discussed and the following options were decided upon as a follow-up plan to address the patient's pain: use of non-medical modalities (ice, heat, stretching and/or behavior modifications).         Camilo Coleman MD  4/2/2021  06:40 CDT        Part of this note may be an electronic transcription/translation of spoken language to printed text using the Dragon Dictation System.          CC:

## 2021-04-02 ENCOUNTER — TELEPHONE (OUTPATIENT)
Dept: ONCOLOGY | Facility: HOSPITAL | Age: 61
End: 2021-04-02

## 2021-04-02 NOTE — TELEPHONE ENCOUNTER
----- Message from Camilo Coleman MD sent at 4/2/2021  6:43 AM CDT -----  Please let patient know, her kidney blood work is higher than normal at 1.01.  She needs to increase hydration.  Her iron, B12 and folate levels are showing improvement.  She needs to continue taking ferrous sulfate 1 tablet p.o. daily along with B12 and folic acid daily.  Thank you

## 2021-04-05 ENCOUNTER — TELEPHONE (OUTPATIENT)
Dept: ONCOLOGY | Facility: HOSPITAL | Age: 61
End: 2021-04-05

## 2021-04-05 NOTE — TELEPHONE ENCOUNTER
Pt returns call, after verifying name and , lab results given to pt along with along with instructions for taking medications as ordered by Dr. Coleman.  V/u obtained.

## 2021-06-01 RX ORDER — FERROUS SULFATE 325(65) MG
TABLET ORAL
Qty: 30 TABLET | Refills: 3 | Status: SHIPPED | OUTPATIENT
Start: 2021-06-01 | End: 2021-07-29 | Stop reason: SDUPTHER

## 2021-07-02 RX ORDER — DEXLANSOPRAZOLE 60 MG/1
60 CAPSULE, DELAYED RELEASE ORAL DAILY
Qty: 30 CAPSULE | Refills: 5 | Status: SHIPPED | OUTPATIENT
Start: 2021-07-02 | End: 2021-07-14 | Stop reason: SDUPTHER

## 2021-07-02 NOTE — TELEPHONE ENCOUNTER
----- Message from Addy Charles sent at 7/2/2021 10:06 AM CDT -----  Called to say that she is needing a refill on her Dexilant 60 MG Capsules. She uses [facility] on [facility] in [place].

## 2021-07-02 NOTE — TELEPHONE ENCOUNTER
07/02/2021, 1216 - Patient telephoned per this staff member [phone].  Zero answer.  Voice message submitted with date, time, office contact information, and notification prescription medication renewal request for Dexilant 60 MG Capsules has been submitted to Dr. Babar Hernandez M.D. for approval.  Once approved, [facility] will [facility] [employer] [facility], [facility].

## 2021-07-02 NOTE — TELEPHONE ENCOUNTER
Called to say that she is needing a refill on her Dexilant 60 MG Capsules. She uses [facility] on [facility] in [place].

## 2021-07-16 RX ORDER — DEXLANSOPRAZOLE 60 MG/1
60 CAPSULE, DELAYED RELEASE ORAL DAILY
Qty: 30 CAPSULE | Refills: 5 | Status: SHIPPED | OUTPATIENT
Start: 2021-07-16 | End: 2022-02-22 | Stop reason: SDUPTHER

## 2021-07-29 ENCOUNTER — LAB (OUTPATIENT)
Dept: ONCOLOGY | Facility: HOSPITAL | Age: 61
End: 2021-07-29

## 2021-07-29 ENCOUNTER — OFFICE VISIT (OUTPATIENT)
Dept: ONCOLOGY | Facility: CLINIC | Age: 61
End: 2021-07-29

## 2021-07-29 VITALS
HEART RATE: 69 BPM | OXYGEN SATURATION: 97 % | SYSTOLIC BLOOD PRESSURE: 153 MMHG | TEMPERATURE: 97.4 F | DIASTOLIC BLOOD PRESSURE: 93 MMHG | WEIGHT: 126.5 LBS | BODY MASS INDEX: 22.41 KG/M2

## 2021-07-29 DIAGNOSIS — M54.42 ACUTE LEFT-SIDED LOW BACK PAIN WITH LEFT-SIDED SCIATICA: Primary | ICD-10-CM

## 2021-07-29 DIAGNOSIS — E61.1 IRON DEFICIENCY: ICD-10-CM

## 2021-07-29 DIAGNOSIS — C15.4 MALIGNANT NEOPLASM OF THORACIC ESOPHAGUS (HCC): ICD-10-CM

## 2021-07-29 DIAGNOSIS — E61.1 IRON DEFICIENCY: Primary | ICD-10-CM

## 2021-07-29 LAB
ALBUMIN SERPL-MCNC: 3.8 G/DL (ref 3.5–5.2)
ALBUMIN/GLOB SERPL: 1.4 G/DL
ALP SERPL-CCNC: 93 U/L (ref 39–117)
ALT SERPL W P-5'-P-CCNC: 12 U/L (ref 1–33)
ANION GAP SERPL CALCULATED.3IONS-SCNC: 9 MMOL/L (ref 5–15)
AST SERPL-CCNC: 15 U/L (ref 1–32)
BASOPHILS # BLD AUTO: 0.03 10*3/MM3 (ref 0–0.2)
BASOPHILS NFR BLD AUTO: 0.6 % (ref 0–1.5)
BILIRUB SERPL-MCNC: 0.8 MG/DL (ref 0–1.2)
BUN SERPL-MCNC: 17 MG/DL (ref 8–23)
BUN/CREAT SERPL: 18.7 (ref 7–25)
CALCIUM SPEC-SCNC: 8.4 MG/DL (ref 8.6–10.5)
CHLORIDE SERPL-SCNC: 103 MMOL/L (ref 98–107)
CO2 SERPL-SCNC: 26 MMOL/L (ref 22–29)
CREAT SERPL-MCNC: 0.91 MG/DL (ref 0.57–1)
DEPRECATED RDW RBC AUTO: 44.7 FL (ref 37–54)
EOSINOPHIL # BLD AUTO: 0.05 10*3/MM3 (ref 0–0.4)
EOSINOPHIL NFR BLD AUTO: 1 % (ref 0.3–6.2)
ERYTHROCYTE [DISTWIDTH] IN BLOOD BY AUTOMATED COUNT: 12.3 % (ref 12.3–15.4)
FERRITIN SERPL-MCNC: 76.25 NG/ML (ref 13–150)
FOLATE SERPL-MCNC: >20 NG/ML (ref 4.78–24.2)
GFR SERPL CREATININE-BSD FRML MDRD: 76 ML/MIN/1.73
GLOBULIN UR ELPH-MCNC: 2.7 GM/DL
GLUCOSE SERPL-MCNC: 123 MG/DL (ref 65–99)
HCT VFR BLD AUTO: 42.4 % (ref 34–46.6)
HGB BLD-MCNC: 13.7 G/DL (ref 12–15.9)
HOLD SPECIMEN: NORMAL
IMM GRANULOCYTES # BLD AUTO: 0.02 10*3/MM3 (ref 0–0.05)
IMM GRANULOCYTES NFR BLD AUTO: 0.4 % (ref 0–0.5)
IRON 24H UR-MRATE: 126 MCG/DL (ref 37–145)
IRON SATN MFR SERPL: 34 % (ref 20–50)
LYMPHOCYTES # BLD AUTO: 1.15 10*3/MM3 (ref 0.7–3.1)
LYMPHOCYTES NFR BLD AUTO: 22.9 % (ref 19.6–45.3)
MCH RBC QN AUTO: 31.8 PG (ref 26.6–33)
MCHC RBC AUTO-ENTMCNC: 32.3 G/DL (ref 31.5–35.7)
MCV RBC AUTO: 98.4 FL (ref 79–97)
MONOCYTES # BLD AUTO: 0.49 10*3/MM3 (ref 0.1–0.9)
MONOCYTES NFR BLD AUTO: 9.7 % (ref 5–12)
NEUTROPHILS NFR BLD AUTO: 3.29 10*3/MM3 (ref 1.7–7)
NEUTROPHILS NFR BLD AUTO: 65.4 % (ref 42.7–76)
NRBC BLD AUTO-RTO: 0 /100 WBC (ref 0–0.2)
PLATELET # BLD AUTO: 225 10*3/MM3 (ref 140–450)
PMV BLD AUTO: 12 FL (ref 6–12)
POTASSIUM SERPL-SCNC: 3.8 MMOL/L (ref 3.5–5.2)
PROT SERPL-MCNC: 6.5 G/DL (ref 6–8.5)
RBC # BLD AUTO: 4.31 10*6/MM3 (ref 3.77–5.28)
SODIUM SERPL-SCNC: 138 MMOL/L (ref 136–145)
TIBC SERPL-MCNC: 368 MCG/DL (ref 298–536)
TRANSFERRIN SERPL-MCNC: 247 MG/DL (ref 200–360)
VIT B12 BLD-MCNC: 1082 PG/ML (ref 211–946)
WBC # BLD AUTO: 5.03 10*3/MM3 (ref 3.4–10.8)

## 2021-07-29 PROCEDURE — 80053 COMPREHEN METABOLIC PANEL: CPT

## 2021-07-29 PROCEDURE — 99213 OFFICE O/P EST LOW 20 MIN: CPT | Performed by: NURSE PRACTITIONER

## 2021-07-29 PROCEDURE — 82728 ASSAY OF FERRITIN: CPT

## 2021-07-29 PROCEDURE — 82607 VITAMIN B-12: CPT

## 2021-07-29 PROCEDURE — 84466 ASSAY OF TRANSFERRIN: CPT

## 2021-07-29 PROCEDURE — 82746 ASSAY OF FOLIC ACID SERUM: CPT

## 2021-07-29 PROCEDURE — 83540 ASSAY OF IRON: CPT

## 2021-07-29 PROCEDURE — G0463 HOSPITAL OUTPT CLINIC VISIT: HCPCS | Performed by: NURSE PRACTITIONER

## 2021-07-29 PROCEDURE — 85025 COMPLETE CBC W/AUTO DIFF WBC: CPT

## 2021-07-29 RX ORDER — FERROUS SULFATE 325(65) MG
1 TABLET ORAL
Qty: 30 TABLET | Refills: 3 | Status: SHIPPED | OUTPATIENT
Start: 2021-07-29 | End: 2022-02-07

## 2021-07-29 RX ORDER — FOLIC ACID 1 MG/1
1 TABLET ORAL DAILY
Qty: 90 TABLET | Refills: 1 | Status: SHIPPED | OUTPATIENT
Start: 2021-07-29 | End: 2022-03-25

## 2021-07-29 NOTE — PROGRESS NOTES
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  Denies any new lymph node enlargement.   Patient was started on ferrous sulfate, B-12 and folic acid on last visit.  She states she has started having bright red blood in her stools; she is scheduled to see Dr. Hernandez on 8/19.  Also states has started having significant low back left hip pain. Denies any injury.  Denies any major weight loss recently.           Past Medical History, Past Surgical History, Social History, Family History have been reviewed and are without significant changes except as mentioned.    Review of Systems   A comprehensive 14 point review of systems was performed and was negative except as mentioned.  +new low back and left hip pain  +hematochezia  Medications:  The current medication list was reviewed in the EMR    ALLERGIES:    Allergies   Allergen Reactions   • Penicillins Hives   • Strawberry C [Ascorbate] Hives       Objective      Vitals:    07/29/21 0946   BP: 153/93   Pulse: 69   Temp: 97.4 °F (36.3 °C)   SpO2: 97%   Weight: 57.4 kg (126 lb 8 oz)   PainSc: 10-Worst pain ever   PainLoc: Leg  Comment: at night her pain level in her left left is a 10     Current Status 4/1/2021   ECOG score 0       Physical Exam  Cardiovascular:      Rate and Rhythm: Normal rate and regular rhythm.   Pulmonary:      Breath sounds: Normal breath sounds. No wheezing or rhonchi.   Abdominal:      General: Bowel sounds are normal.      Palpations: Abdomen is soft.      Tenderness: There is no abdominal tenderness.   Neurological:      Mental Status: She is alert and oriented to person, place, and time.         RECENT LABS:  Glucose   Date  Value Ref Range Status   07/29/2021 123 (H) 65 - 99 mg/dL Final     Sodium   Date Value Ref Range Status   07/29/2021 138 136 - 145 mmol/L Final     Potassium   Date Value Ref Range Status   07/29/2021 3.8 3.5 - 5.2 mmol/L Final     CO2   Date Value Ref Range Status   07/29/2021 26.0 22.0 - 29.0 mmol/L Final     Chloride   Date Value Ref Range Status   07/29/2021 103 98 - 107 mmol/L Final     Anion Gap   Date Value Ref Range Status   07/29/2021 9.0 5.0 - 15.0 mmol/L Final     Creatinine   Date Value Ref Range Status   07/29/2021 0.91 0.57 - 1.00 mg/dL Final     BUN   Date Value Ref Range Status   07/29/2021 17 8 - 23 mg/dL Final     BUN/Creatinine Ratio   Date Value Ref Range Status   07/29/2021 18.7 7.0 - 25.0 Final     Calcium   Date Value Ref Range Status   07/29/2021 8.4 (L) 8.6 - 10.5 mg/dL Final     Alkaline Phosphatase   Date Value Ref Range Status   07/29/2021 93 39 - 117 U/L Final     Total Protein   Date Value Ref Range Status   07/29/2021 6.5 6.0 - 8.5 g/dL Final     ALT (SGPT)   Date Value Ref Range Status   07/29/2021 12 1 - 33 U/L Final     AST (SGOT)   Date Value Ref Range Status   07/29/2021 15 1 - 32 U/L Final     Total Bilirubin   Date Value Ref Range Status   07/29/2021 0.8 0.0 - 1.2 mg/dL Final     Albumin   Date Value Ref Range Status   07/29/2021 3.80 3.50 - 5.20 g/dL Final     Globulin   Date Value Ref Range Status   07/29/2021 2.7 gm/dL Final     Lab Results   Component Value Date    WBC 5.03 07/29/2021    HGB 13.7 07/29/2021    HCT 42.4 07/29/2021    MCV 98.4 (H) 07/29/2021     07/29/2021     Lab Results   Component Value Date    NEUTROABS 3.29 07/29/2021    IRON 126 07/29/2021    IRON 96 04/01/2021    IRON 62 12/11/2020    TIBC 368 07/29/2021    TIBC 395 04/01/2021    TIBC 465 12/11/2020    LABIRON 34 07/29/2021    LABIRON 24 04/01/2021    LABIRON 13 (L) 12/11/2020    FERRITIN 76.25 07/29/2021    FERRITIN 55.18 04/01/2021    FERRITIN 18.09 12/11/2020    ZMIGLTXB65 853 04/01/2021     [id number] 280 12/11/2020    FOLATE >20.00 04/01/2021    FOLATE 7.71 12/11/2020     No results found for: , LABCA2, AFPTM, HCGQUANT, , CHROMGRNA, [id number], CEA, REFLABREPO            RADIOLOGY DATA :  No radiology results for the last 7 days        Assessment/Plan      1. Squamous cell cancer of midesophagus:  -Patient was diagnosed in September 2015  -Had a concurrent chemoradiation with carboplatin and Taxol that completed in January 2016  -Had esophagectomy done on March 1, 2016 that showed complete pathologic response.  Patient has been on surveillance since then  -EGD on Marta 3, 2020 did not show any evidence of recurrence.  -Since patient is more than 3 years out from her diagnosis, no routine CT scan has been recommended as per NCCN guideline  -We will ask patient to return to clinic in 3 months with repeat CBC, CMP, iron studies, ferritin, B12 and folate to be done on that date.     2.  Iron deficiency:  -Patient was found to have iron deficiency in December 2020.  For which patient has been started on ferrous sulfate 1 tablet p.o. daily along with B12 and folic acid daily  -Anemia work-up done today shows improvement in iron, B12 and folate.  Recommend continue with ferrous sulfate 1 tablet p.o. daily, B12 and folic acid.     3. Low back pain; problem is new  -CT lumbar spine to further evaluate    4. Hematochezia; problem is new; pt scheduled to see GI on 8/19; will call and see if they can see her sooner.  5. Advance Care Planning: For now patient remains full code and is able to make decisions.  Patient has health care surrogate mentioned on chart.                     PHQ-9 Total Score: 0     Maddison Powellauroraosman Sheridan reports a pain score of 10.  Given her pain assessment as noted, treatment options were discussed and the following options were decided upon as a follow-up plan to address the patient's pain: ordering radiology studies to evalute.         Milagros Velasquez, APRN  7/29/2021  12:22  CDT        Part of this note may be an electronic transcription/translation of spoken language to printed text using the Dragon Dictation System.          CC:

## 2021-08-05 ENCOUNTER — OFFICE VISIT (OUTPATIENT)
Dept: GASTROENTEROLOGY | Facility: CLINIC | Age: 61
End: 2021-08-05

## 2021-08-05 VITALS
DIASTOLIC BLOOD PRESSURE: 93 MMHG | HEART RATE: 75 BPM | HEIGHT: 63 IN | SYSTOLIC BLOOD PRESSURE: 151 MMHG | BODY MASS INDEX: 22.71 KG/M2 | WEIGHT: 128.2 LBS

## 2021-08-05 DIAGNOSIS — K92.1 BLOOD IN STOOL: Primary | ICD-10-CM

## 2021-08-05 PROCEDURE — 99214 OFFICE O/P EST MOD 30 MIN: CPT | Performed by: INTERNAL MEDICINE

## 2021-08-05 RX ORDER — FAMOTIDINE 20 MG/1
20 TABLET, FILM COATED ORAL DAILY
COMMUNITY
End: 2021-11-29 | Stop reason: SDUPTHER

## 2021-08-05 RX ORDER — DEXTROSE AND SODIUM CHLORIDE 5; .45 G/100ML; G/100ML
30 INJECTION, SOLUTION INTRAVENOUS CONTINUOUS PRN
Status: CANCELLED | OUTPATIENT
Start: 2021-08-17

## 2021-08-05 RX ORDER — LANOLIN ALCOHOL/MO/W.PET/CERES
1000 CREAM (GRAM) TOPICAL DAILY
Qty: 90 TABLET | Refills: 1 | Status: SHIPPED | OUTPATIENT
Start: 2021-08-05 | End: 2022-02-07

## 2021-08-05 NOTE — PROGRESS NOTES
[facility]      Chief Complaint:   Chief Complaint   Patient presents with   • Black or Bloody Stool       Subjective     HPI:   Patient states that she has been having some blood in her stool.  Patient denies any abdominal pain nausea vomiting.  Patient was supposed to have a colonoscopy few months ago but did not come for this.  Patient also has left leg pain.  On palpation patient appears to have sciatica.  Patient had previous colonoscopy many years ago which had a poor prep.  Recommendation was made for repeat colonoscopy but this was never completed    Plan; we will schedule patient for colonoscopy as patient is having blood in the stool.    Past Medical History:   Past Medical History:   Diagnosis Date   • Abnormal weight loss    • Anxiety    • Atrophic vaginitis    • Depression    • Dysphagia     pharyngoesophageal phase      • Encounter for surgical aftercare following surgery of digestive system     Walter Michael (thoracic esophagectomy, lymphadenectomly, Bronchoscopy, Placement on Q. 3/1/16      • History of esophagogastroduodenoscopy     Normal hypopharynx.A tumor was found in the middle third of the esophagus.Mul.biopsies taken.Normal GE junction.Mild nonerosive gastritis in antrum.Biopsies taken.Normal pylorus and duodenum. 09/24/2015       • History of hysteroscopy     Hysteroscopy, dilation and curettage, and repair of vaginal laceration. 08/03/2013       • History of substance abuse (CMS/HCC)    • Hypertension    • Lumbosacral strain    • Myopia    • Presbyopia    • Primary malignant neoplasm of thoracic part of esophagus (CMS/HCC)     Squamous cell   • Tobacco dependence     1 ppd       Past Surgical History:  Past Surgical History:   Procedure Laterality Date   • ABDOMINAL SURGERY  03/01/2016    Abdominal portion of an Walter Michael esophagogastrectomy with jejunostomy tube placement. Esophageal carcinoma.   • COLONOSCOPY N/A 12/18/2017    Procedure: COLONOSCOPY;  Surgeon: Babar  BAIRON Connell MD;  Location: [facility] ENDOSCOPY;  Service:    • ENDOSCOPY N/A 2/3/2017    Procedure: ESOPHAGOGASTRODUODENOSCOPY;  Surgeon: Babar Connell MD;  Location: [facility] ENDOSCOPY;  Service:    • ENDOSCOPY N/A 5/22/2017    Procedure: ESOPHAGOGASTRODUODENOSCOPY;  Surgeon: Babar Connell MD;  Location: [facility] ENDOSCOPY;  Service:    • ENDOSCOPY N/A 9/22/2017    Procedure: ESOPHAGOGASTRODUODENOSCOPY;  Surgeon: Babar Connell MD;  Location: [facility] ENDOSCOPY;  Service:    • ENDOSCOPY N/A 12/18/2017    Procedure: ESOPHAGOGASTRODUODENOSCOPY;  Surgeon: Babar Connell MD;  Location: [facility] ENDOSCOPY;  Service:    • ENDOSCOPY N/A 6/14/2018    Procedure: ESOPHAGOGASTRODUODENOSCOPY possible dilation;  Surgeon: Babar Connell MD;  Location: [facility] ENDOSCOPY;  Service: Gastroenterology   • ENDOSCOPY N/A 12/14/2018    Procedure: ESOPHAGOGASTRODUODENOSCOPY;  Surgeon: Babar Connell MD;  Location: [facility] ENDOSCOPY;  Service: Gastroenterology   • ENDOSCOPY N/A 12/16/2019    Procedure: ESOPHAGOGASTRODUODENOSCOPY;  Surgeon: Babar Connell MD;  Location: [facility] ENDOSCOPY;  Service: Gastroenterology   • ENDOSCOPY N/A 6/3/2020    Procedure: ESOPHAGOGASTRODUODENOSCOPY;  Surgeon: Babar Connell MD;  Location: [facility] ENDOSCOPY;  Service: Gastroenterology;  Laterality: N/A;   • ENDOSCOPY N/A 2/9/2021    Procedure: ESOPHAGOGASTRODUODENOSCOPY;  Surgeon: Babar Connell MD;  Location: [facility] ENDOSCOPY;  Service: Gastroenterology;  Laterality: N/A;   • ESOPHAGUS SURGERY      Thoracic esophagectomy (Mallie Michael type).Thoracic lymphadenectomy.Fiberoptic bronchoscopy.Placement of On Q pain pump.Jejunostomy tube placement.Pyloromyotomy. 03/01/2016      • LUMBAR DISC SURGERY     • UPPER GASTROINTESTINAL ENDOSCOPY  02/03/2017   • UPPER GASTROINTESTINAL ENDOSCOPY  05/22/2017   • UPPER GASTROINTESTINAL ENDOSCOPY  09/22/2017   • UPPER GASTROINTESTINAL ENDOSCOPY  12/18/2017   • UPPER GASTROINTESTINAL ENDOSCOPY  06/14/2018   • UPPER  GASTROINTESTINAL ENDOSCOPY  12/14/2018   • UPPER GASTROINTESTINAL ENDOSCOPY  02/09/2021       Family History:  Family History   Problem Relation Age of Onset   • Ovarian cancer Mother    • Cancer Mother    • Osteoporosis Other    • Heart disease Other    • Diabetes Other    • Cancer Other         Colorectal-Parent   • Asthma Other    • Asthma Sister    • Diabetes Sister    • Hypertension Sister    • Alcohol abuse Brother    • Cancer Maternal Uncle        Social History:   reports that she quit smoking about 6 years ago. Her smoking use included cigarettes. She smoked 0.50 packs per day. She has never used smokeless tobacco. She reports current alcohol use. She reports that she does not use drugs.    Medications:   Prior to Admission medications    Medication Sig Start Date End Date Taking? Authorizing Provider   albuterol (PROVENTIL HFA;VENTOLIN HFA) 108 (90 BASE) MCG/ACT inhaler Inhale 2 puffs Every 4 (Four) Hours As Needed for wheezing. 2/17/17  Yes Tiffany Jimenez MD   dexlansoprazole (Dexilant) 60 MG capsule Take 1 capsule by mouth Daily. 7/16/21  Yes Babar Connell MD   famotidine (PEPCID) 20 MG tablet Take 20 mg by mouth Daily.   Yes Zoey Cruz MD   ferrous sulfate (FeroSul) 325 (65 FE) MG tablet Take 1 tablet by mouth Daily With Breakfast. 7/29/21  Yes Milagros Velasquez APRN   folic acid (FOLVITE) 1 MG tablet Take 1 tablet by mouth Daily. 7/29/21  Yes Milagros Velasquez APRN   vitamin B-12 (CYANOCOBALAMIN) 1000 MCG tablet Take 1 tablet by mouth Daily. 12/18/20  Yes Camilo Coleman MD   famotidine (Pepcid) 20 MG tablet Take 1 tablet by mouth Daily. 2/4/21 8/5/21  Babar Connell MD   polyethylene glycol (GoLYTELY) 236 g solution Take as directed in office. 2/10/21 8/5/21  Babar Connell MD       Allergies:  Penicillins and Strawberry c [ascorbate]    ROS:    Review of Systems   Constitutional: Negative for activity change, appetite change, chills, diaphoresis, fatigue, fever and unexpected weight  "change.   HENT: Negative for sore throat and trouble swallowing.    Respiratory: Negative for shortness of breath.    Gastrointestinal: Positive for blood in stool. Negative for abdominal distention, abdominal pain, anal bleeding, constipation, diarrhea, nausea, rectal pain and vomiting.   Endocrine: Negative for polydipsia, polyphagia and polyuria.   Genitourinary: Negative for difficulty urinating.   Musculoskeletal: Negative for arthralgias.   Skin: Negative for pallor.   Allergic/Immunologic: Negative for food allergies.   Neurological: Negative for weakness and light-headedness.   Psychiatric/Behavioral: Negative for behavioral problems.     Objective     Blood pressure 151/93, pulse 75, height 160 cm (63\"), weight 58.2 kg (128 lb 3.2 oz), last menstrual period 03/17/1989, not currently breastfeeding.    Physical Exam  Constitutional:       General: She is not in acute distress.     Appearance: She is well-developed. She is not diaphoretic.   HENT:      Head: Normocephalic and atraumatic.   Cardiovascular:      Rate and Rhythm: Normal rate and regular rhythm.      Heart sounds: Normal heart sounds. No murmur heard.   No friction rub. No gallop.    Pulmonary:      Effort: No respiratory distress.      Breath sounds: Normal breath sounds. No wheezing or rales.   Chest:      Chest wall: No tenderness.   Abdominal:      General: Bowel sounds are normal. There is no distension.      Palpations: Abdomen is soft. There is no mass.      Tenderness: There is no abdominal tenderness. There is no guarding or rebound.      Hernia: No hernia is present.   Musculoskeletal:         General: Normal range of motion.   Skin:     General: Skin is warm and dry.      Coloration: Skin is not pale.      Findings: No erythema or rash.   Neurological:      Mental Status: She is alert and oriented to person, place, and time.   Psychiatric:         Behavior: Behavior normal.         Thought Content: Thought content normal.         " Judgment: Judgment normal.          Assessment/Plan   Diagnoses and all orders for this visit:    1. Blood in stool (Primary)  -     Case Request; Standing  -     dextrose 5 % and sodium chloride 0.45 % infusion  -     Case Request    Other orders  -     Follow Anesthesia Guidelines / Standing Orders; Future  -     Obtain Informed Consent; Future  -     Implement Anesthesia Orders Day of Procedure; Standing  -     Obtain Informed Consent; Standing  -     POC Glucose Once; Standing  -     Insert Peripheral IV; Standing        COLONOSCOPY (N/A)     Diagnosis Plan   1. Blood in stool  Case Request    dextrose 5 % and sodium chloride 0.45 % infusion    Case Request       Anticipated Surgical Procedure:  Orders Placed This Encounter   Procedures   • Follow Anesthesia Guidelines / Standing Orders     Standing Status:   Future   • Obtain Informed Consent     Standing Status:   Future     Order Specific Question:   Informed Consent Given For     Answer:   colonoscopy       The risks, benefits, and alternatives of this procedure have been discussed with the patient or the responsible party- the patient understands and agrees to proceed.

## 2021-08-05 NOTE — H&P (VIEW-ONLY)
[facility]      Chief Complaint:   Chief Complaint   Patient presents with   • Black or Bloody Stool       Subjective     HPI:   Patient states that she has been having some blood in her stool.  Patient denies any abdominal pain nausea vomiting.  Patient was supposed to have a colonoscopy few months ago but did not come for this.  Patient also has left leg pain.  On palpation patient appears to have sciatica.  Patient had previous colonoscopy many years ago which had a poor prep.  Recommendation was made for repeat colonoscopy but this was never completed    Plan; we will schedule patient for colonoscopy as patient is having blood in the stool.    Past Medical History:   Past Medical History:   Diagnosis Date   • Abnormal weight loss    • Anxiety    • Atrophic vaginitis    • Depression    • Dysphagia     pharyngoesophageal phase      • Encounter for surgical aftercare following surgery of digestive system     Walter Michael (thoracic esophagectomy, lymphadenectomly, Bronchoscopy, Placement on Q. 3/1/16      • History of esophagogastroduodenoscopy     Normal hypopharynx.A tumor was found in the middle third of the esophagus.Mul.biopsies taken.Normal GE junction.Mild nonerosive gastritis in antrum.Biopsies taken.Normal pylorus and duodenum. 09/24/2015       • History of hysteroscopy     Hysteroscopy, dilation and curettage, and repair of vaginal laceration. 08/03/2013       • History of substance abuse (CMS/HCC)    • Hypertension    • Lumbosacral strain    • Myopia    • Presbyopia    • Primary malignant neoplasm of thoracic part of esophagus (CMS/HCC)     Squamous cell   • Tobacco dependence     1 ppd       Past Surgical History:  Past Surgical History:   Procedure Laterality Date   • ABDOMINAL SURGERY  03/01/2016    Abdominal portion of an Walter Michael esophagogastrectomy with jejunostomy tube placement. Esophageal carcinoma.   • COLONOSCOPY N/A 12/18/2017    Procedure: COLONOSCOPY;  Surgeon: Babar  BAIRON Connell MD;  Location: [facility] ENDOSCOPY;  Service:    • ENDOSCOPY N/A 2/3/2017    Procedure: ESOPHAGOGASTRODUODENOSCOPY;  Surgeon: Babar Connell MD;  Location: [facility] ENDOSCOPY;  Service:    • ENDOSCOPY N/A 5/22/2017    Procedure: ESOPHAGOGASTRODUODENOSCOPY;  Surgeon: Babar Connell MD;  Location: [facility] ENDOSCOPY;  Service:    • ENDOSCOPY N/A 9/22/2017    Procedure: ESOPHAGOGASTRODUODENOSCOPY;  Surgeon: Babar Connell MD;  Location: [facility] ENDOSCOPY;  Service:    • ENDOSCOPY N/A 12/18/2017    Procedure: ESOPHAGOGASTRODUODENOSCOPY;  Surgeon: Babar Connell MD;  Location: [facility] ENDOSCOPY;  Service:    • ENDOSCOPY N/A 6/14/2018    Procedure: ESOPHAGOGASTRODUODENOSCOPY possible dilation;  Surgeon: Babar Connell MD;  Location: [facility] ENDOSCOPY;  Service: Gastroenterology   • ENDOSCOPY N/A 12/14/2018    Procedure: ESOPHAGOGASTRODUODENOSCOPY;  Surgeon: Babar Connell MD;  Location: [facility] ENDOSCOPY;  Service: Gastroenterology   • ENDOSCOPY N/A 12/16/2019    Procedure: ESOPHAGOGASTRODUODENOSCOPY;  Surgeon: Babar Connell MD;  Location: [facility] ENDOSCOPY;  Service: Gastroenterology   • ENDOSCOPY N/A 6/3/2020    Procedure: ESOPHAGOGASTRODUODENOSCOPY;  Surgeon: Babar Connell MD;  Location: [facility] ENDOSCOPY;  Service: Gastroenterology;  Laterality: N/A;   • ENDOSCOPY N/A 2/9/2021    Procedure: ESOPHAGOGASTRODUODENOSCOPY;  Surgeon: Babar Connell MD;  Location: [facility] ENDOSCOPY;  Service: Gastroenterology;  Laterality: N/A;   • ESOPHAGUS SURGERY      Thoracic esophagectomy (Melbeta Michael type).Thoracic lymphadenectomy.Fiberoptic bronchoscopy.Placement of On Q pain pump.Jejunostomy tube placement.Pyloromyotomy. 03/01/2016      • LUMBAR DISC SURGERY     • UPPER GASTROINTESTINAL ENDOSCOPY  02/03/2017   • UPPER GASTROINTESTINAL ENDOSCOPY  05/22/2017   • UPPER GASTROINTESTINAL ENDOSCOPY  09/22/2017   • UPPER GASTROINTESTINAL ENDOSCOPY  12/18/2017   • UPPER GASTROINTESTINAL ENDOSCOPY  06/14/2018   • UPPER  GASTROINTESTINAL ENDOSCOPY  12/14/2018   • UPPER GASTROINTESTINAL ENDOSCOPY  02/09/2021       Family History:  Family History   Problem Relation Age of Onset   • Ovarian cancer Mother    • Cancer Mother    • Osteoporosis Other    • Heart disease Other    • Diabetes Other    • Cancer Other         Colorectal-Parent   • Asthma Other    • Asthma Sister    • Diabetes Sister    • Hypertension Sister    • Alcohol abuse Brother    • Cancer Maternal Uncle        Social History:   reports that she quit smoking about 6 years ago. Her smoking use included cigarettes. She smoked 0.50 packs per day. She has never used smokeless tobacco. She reports current alcohol use. She reports that she does not use drugs.    Medications:   Prior to Admission medications    Medication Sig Start Date End Date Taking? Authorizing Provider   albuterol (PROVENTIL HFA;VENTOLIN HFA) 108 (90 BASE) MCG/ACT inhaler Inhale 2 puffs Every 4 (Four) Hours As Needed for wheezing. 2/17/17  Yes Tiffany Jimenez MD   dexlansoprazole (Dexilant) 60 MG capsule Take 1 capsule by mouth Daily. 7/16/21  Yes Babar Connell MD   famotidine (PEPCID) 20 MG tablet Take 20 mg by mouth Daily.   Yes Zoey Cruz MD   ferrous sulfate (FeroSul) 325 (65 FE) MG tablet Take 1 tablet by mouth Daily With Breakfast. 7/29/21  Yes Milagros Velasquez APRN   folic acid (FOLVITE) 1 MG tablet Take 1 tablet by mouth Daily. 7/29/21  Yes Milagros Velasquez APRN   vitamin B-12 (CYANOCOBALAMIN) 1000 MCG tablet Take 1 tablet by mouth Daily. 12/18/20  Yes Camilo Coleman MD   famotidine (Pepcid) 20 MG tablet Take 1 tablet by mouth Daily. 2/4/21 8/5/21  Babar Connell MD   polyethylene glycol (GoLYTELY) 236 g solution Take as directed in office. 2/10/21 8/5/21  Babar Connell MD       Allergies:  Penicillins and Strawberry c [ascorbate]    ROS:    Review of Systems   Constitutional: Negative for activity change, appetite change, chills, diaphoresis, fatigue, fever and unexpected weight  "change.   HENT: Negative for sore throat and trouble swallowing.    Respiratory: Negative for shortness of breath.    Gastrointestinal: Positive for blood in stool. Negative for abdominal distention, abdominal pain, anal bleeding, constipation, diarrhea, nausea, rectal pain and vomiting.   Endocrine: Negative for polydipsia, polyphagia and polyuria.   Genitourinary: Negative for difficulty urinating.   Musculoskeletal: Negative for arthralgias.   Skin: Negative for pallor.   Allergic/Immunologic: Negative for food allergies.   Neurological: Negative for weakness and light-headedness.   Psychiatric/Behavioral: Negative for behavioral problems.     Objective     Blood pressure 151/93, pulse 75, height 160 cm (63\"), weight 58.2 kg (128 lb 3.2 oz), last menstrual period 03/17/1989, not currently breastfeeding.    Physical Exam  Constitutional:       General: She is not in acute distress.     Appearance: She is well-developed. She is not diaphoretic.   HENT:      Head: Normocephalic and atraumatic.   Cardiovascular:      Rate and Rhythm: Normal rate and regular rhythm.      Heart sounds: Normal heart sounds. No murmur heard.   No friction rub. No gallop.    Pulmonary:      Effort: No respiratory distress.      Breath sounds: Normal breath sounds. No wheezing or rales.   Chest:      Chest wall: No tenderness.   Abdominal:      General: Bowel sounds are normal. There is no distension.      Palpations: Abdomen is soft. There is no mass.      Tenderness: There is no abdominal tenderness. There is no guarding or rebound.      Hernia: No hernia is present.   Musculoskeletal:         General: Normal range of motion.   Skin:     General: Skin is warm and dry.      Coloration: Skin is not pale.      Findings: No erythema or rash.   Neurological:      Mental Status: She is alert and oriented to person, place, and time.   Psychiatric:         Behavior: Behavior normal.         Thought Content: Thought content normal.         " Judgment: Judgment normal.          Assessment/Plan   Diagnoses and all orders for this visit:    1. Blood in stool (Primary)  -     Case Request; Standing  -     dextrose 5 % and sodium chloride 0.45 % infusion  -     Case Request    Other orders  -     Follow Anesthesia Guidelines / Standing Orders; Future  -     Obtain Informed Consent; Future  -     Implement Anesthesia Orders Day of Procedure; Standing  -     Obtain Informed Consent; Standing  -     POC Glucose Once; Standing  -     Insert Peripheral IV; Standing        COLONOSCOPY (N/A)     Diagnosis Plan   1. Blood in stool  Case Request    dextrose 5 % and sodium chloride 0.45 % infusion    Case Request       Anticipated Surgical Procedure:  Orders Placed This Encounter   Procedures   • Follow Anesthesia Guidelines / Standing Orders     Standing Status:   Future   • Obtain Informed Consent     Standing Status:   Future     Order Specific Question:   Informed Consent Given For     Answer:   colonoscopy       The risks, benefits, and alternatives of this procedure have been discussed with the patient or the responsible party- the patient understands and agrees to proceed.

## 2021-08-05 NOTE — PATIENT INSTRUCTIONS
MyPlate from USDA    MyPlate is an outline of a general healthy diet based on the 2010 Dietary Guidelines for Americans, from the U.S. Department of Agriculture (USDA). It sets guidelines for how much food you should eat from each food group based on your age, sex, and level of physical activity.  What are tips for following MyPlate?  To follow MyPlate recommendations:  · Eat a wide variety of fruits and vegetables, grains, and protein foods.  · Serve smaller portions and eat less food throughout the day.  · Limit portion sizes to avoid overeating.  · Enjoy your food.  · Get at least 150 minutes of exercise every week. This is about 30 minutes each day, 5 or more days per week.  It can be difficult to have every meal look like MyPlate. Think about MyPlate as eating guidelines for an entire day, rather than each individual meal.  Fruits and vegetables  · Make half of your plate fruits and vegetables.  · Eat many different colors of fruits and vegetables each day.  · For a 2,000 calorie daily food plan, eat:  ? 2½ cups of vegetables every day.  ? 2 cups of fruit every day.  · 1 cup is equal to:  ? 1 cup raw or cooked vegetables.  ? 1 cup raw fruit.  ? 1 medium-sized orange, apple, or banana.  ? 1 cup 100% fruit or vegetable juice.  ? 2 cups raw leafy greens, such as lettuce, spinach, or kale.  ? ½ cup dried fruit.  Grains  · One fourth of your plate should be grains.  · Make at least half of the grains you eat each day whole grains.  · For a 2,000 calorie daily food plan, eat 6 oz of grains every day.  · 1 oz is equal to:  ? 1 slice bread.  ? 1 cup cereal.  ? ½ cup cooked rice, cereal, or pasta.  Protein  · One fourth of your plate should be protein.  · Eat a wide variety of protein foods, including meat, poultry, fish, eggs, beans, nuts, and tofu.  · For a 2,000 calorie daily food plan, eat 5½ oz of protein every day.  · 1 oz is equal to:  ? 1 oz meat, poultry, or fish.  ? ¼ cup cooked beans.  ? 1 egg.  ? ½ oz nuts  or seeds.  ? 1 Tbsp peanut butter.  Dairy  · Drink fat-free or low-fat (1%) milk.  · Eat or drink dairy as a side to meals.  · For a 2,000 calorie daily food plan, eat or drink 3 cups of dairy every day.  · 1 cup is equal to:  ? 1 cup milk, yogurt, cottage cheese, or soy milk (soy beverage).  ? 2 oz processed cheese.  ? 1½ oz natural cheese.  Fats, oils, salt, and sugars  · Only small amounts of oils are recommended.  · Avoid foods that are high in calories and low in nutritional value (empty calories), like foods high in fat or added sugars.  · Choose foods that are low in salt (sodium). Choose foods that have less than 140 milligrams (mg) of sodium per serving.  · Drink water instead of sugary drinks. Drink enough water each day to keep your urine pale yellow.  Where to find support  · Work with your health care provider or a nutrition specialist (dietitian) to develop a customized eating plan that is right for you.  · Download an vanessa (mobile application) to help you track your daily food intake.  Where to find more information  · Go to ChooseMyPlate.gov for more information.  Summary  · MyPlate is a general guideline for healthy eating from the USDA. It is based on the 2010 Dietary Guidelines for Americans.  · In general, fruits and vegetables should take up ½ of your plate, grains should take up ¼ of your plate, and protein should take up ¼ of your plate.  This information is not intended to replace advice given to you by your health care provider. Make sure you discuss any questions you have with your health care provider.  Document Revised: 05/21/2020 Document Reviewed: 03/19/2018  Elsevier Patient Education © 2021 Elsevier Inc.

## 2021-08-09 ENCOUNTER — HOSPITAL ENCOUNTER (OUTPATIENT)
Dept: CT IMAGING | Facility: HOSPITAL | Age: 61
Discharge: HOME | End: 2021-08-09
Admitting: NURSE PRACTITIONER

## 2021-08-09 DIAGNOSIS — M54.42 ACUTE LEFT-SIDED LOW BACK PAIN WITH LEFT-SIDED SCIATICA: ICD-10-CM

## 2021-08-09 PROCEDURE — 72132 CT LUMBAR SPINE W/DYE: CPT

## 2021-08-09 PROCEDURE — 25010000002 IOPAMIDOL 61 % SOLUTION: Performed by: NURSE PRACTITIONER

## 2021-08-09 RX ADMIN — IOPAMIDOL 90 ML: 612 INJECTION, SOLUTION INTRAVENOUS at 16:31

## 2021-08-12 ENCOUNTER — TELEPHONE (OUTPATIENT)
Dept: ONCOLOGY | Facility: CLINIC | Age: 61
End: 2021-08-12

## 2021-08-12 ENCOUNTER — TELEPHONE (OUTPATIENT)
Dept: ONCOLOGY | Facility: HOSPITAL | Age: 61
End: 2021-08-12

## 2021-08-17 ENCOUNTER — ANESTHESIA (OUTPATIENT)
Dept: GASTROENTEROLOGY | Facility: HOSPITAL | Age: 61
End: 2021-08-17

## 2021-08-17 ENCOUNTER — HOSPITAL ENCOUNTER (OUTPATIENT)
Facility: HOSPITAL | Age: 61
Setting detail: HOSPITAL OUTPATIENT SURGERY
Discharge: HOME | End: 2021-08-17
Attending: INTERNAL MEDICINE | Admitting: INTERNAL MEDICINE

## 2021-08-17 ENCOUNTER — ANESTHESIA EVENT (OUTPATIENT)
Dept: GASTROENTEROLOGY | Facility: HOSPITAL | Age: 61
End: 2021-08-17

## 2021-08-17 VITALS
TEMPERATURE: 98.1 F | HEART RATE: 94 BPM | DIASTOLIC BLOOD PRESSURE: 89 MMHG | SYSTOLIC BLOOD PRESSURE: 125 MMHG | BODY MASS INDEX: 22.32 KG/M2 | RESPIRATION RATE: 16 BRPM | OXYGEN SATURATION: 96 % | WEIGHT: 126 LBS | HEIGHT: 63 IN

## 2021-08-17 DIAGNOSIS — K92.1 BLOOD IN STOOL: ICD-10-CM

## 2021-08-17 PROCEDURE — 45385 COLONOSCOPY W/LESION REMOVAL: CPT | Performed by: INTERNAL MEDICINE

## 2021-08-17 PROCEDURE — C1889 IMPLANT/INSERT DEVICE, NOC: HCPCS | Performed by: INTERNAL MEDICINE

## 2021-08-17 PROCEDURE — 88305 TISSUE EXAM BY PATHOLOGIST: CPT

## 2021-08-17 PROCEDURE — 25010000002 PROPOFOL 10 MG/ML EMULSION: Performed by: NURSE ANESTHETIST, CERTIFIED REGISTERED

## 2021-08-17 PROCEDURE — 25010000002 GLUCAGON (HUMAN RECOMBINANT) 1 MG RECONSTITUTED SOLUTION: Performed by: NURSE ANESTHETIST, CERTIFIED REGISTERED

## 2021-08-17 DEVICE — DEV CLIP ENDO RESOLUTION360 CONTRL ROT 235CM: Type: IMPLANTABLE DEVICE | Site: COLON | Status: FUNCTIONAL

## 2021-08-17 DEVICE — CLIPPING DEVICE
Type: IMPLANTABLE DEVICE | Site: SIGMOID COLON | Status: FUNCTIONAL
Brand: RESOLUTION CLIP

## 2021-08-17 RX ORDER — MEPERIDINE HYDROCHLORIDE 25 MG/ML
12.5 INJECTION INTRAMUSCULAR; INTRAVENOUS; SUBCUTANEOUS
Status: DISCONTINUED | OUTPATIENT
Start: 2021-08-17 | End: 2021-08-17 | Stop reason: HOSPADM

## 2021-08-17 RX ORDER — DEXTROSE AND SODIUM CHLORIDE 5; .45 G/100ML; G/100ML
30 INJECTION, SOLUTION INTRAVENOUS CONTINUOUS PRN
Status: DISCONTINUED | OUTPATIENT
Start: 2021-08-17 | End: 2021-08-17 | Stop reason: HOSPADM

## 2021-08-17 RX ORDER — PROMETHAZINE HYDROCHLORIDE 25 MG/1
25 SUPPOSITORY RECTAL ONCE AS NEEDED
Status: DISCONTINUED | OUTPATIENT
Start: 2021-08-17 | End: 2021-08-17

## 2021-08-17 RX ORDER — PROPOFOL 10 MG/ML
VIAL (ML) INTRAVENOUS AS NEEDED
Status: DISCONTINUED | OUTPATIENT
Start: 2021-08-17 | End: 2021-08-17 | Stop reason: SURG

## 2021-08-17 RX ORDER — MEPERIDINE HYDROCHLORIDE 25 MG/ML
12.5 INJECTION INTRAMUSCULAR; INTRAVENOUS; SUBCUTANEOUS
Status: DISCONTINUED | OUTPATIENT
Start: 2021-08-17 | End: 2021-08-17

## 2021-08-17 RX ORDER — PROMETHAZINE HYDROCHLORIDE 25 MG/1
25 SUPPOSITORY RECTAL ONCE AS NEEDED
Status: DISCONTINUED | OUTPATIENT
Start: 2021-08-17 | End: 2021-08-17 | Stop reason: HOSPADM

## 2021-08-17 RX ORDER — PROMETHAZINE HYDROCHLORIDE 25 MG/1
25 TABLET ORAL ONCE AS NEEDED
Status: DISCONTINUED | OUTPATIENT
Start: 2021-08-17 | End: 2021-08-17 | Stop reason: HOSPADM

## 2021-08-17 RX ORDER — ONDANSETRON 2 MG/ML
4 INJECTION INTRAMUSCULAR; INTRAVENOUS ONCE AS NEEDED
Status: DISCONTINUED | OUTPATIENT
Start: 2021-08-17 | End: 2021-08-17 | Stop reason: HOSPADM

## 2021-08-17 RX ORDER — LIDOCAINE HYDROCHLORIDE 20 MG/ML
INJECTION, SOLUTION INTRAVENOUS AS NEEDED
Status: DISCONTINUED | OUTPATIENT
Start: 2021-08-17 | End: 2021-08-17 | Stop reason: SURG

## 2021-08-17 RX ORDER — PROMETHAZINE HYDROCHLORIDE 25 MG/1
25 TABLET ORAL ONCE AS NEEDED
Status: DISCONTINUED | OUTPATIENT
Start: 2021-08-17 | End: 2021-08-17

## 2021-08-17 RX ORDER — ONDANSETRON 2 MG/ML
4 INJECTION INTRAMUSCULAR; INTRAVENOUS ONCE AS NEEDED
Status: DISCONTINUED | OUTPATIENT
Start: 2021-08-17 | End: 2021-08-17

## 2021-08-17 RX ADMIN — PROPOFOL 50 MG: 10 INJECTION, EMULSION INTRAVENOUS at 15:33

## 2021-08-17 RX ADMIN — PROPOFOL 50 MG: 10 INJECTION, EMULSION INTRAVENOUS at 15:10

## 2021-08-17 RX ADMIN — PROPOFOL 50 MG: 10 INJECTION, EMULSION INTRAVENOUS at 15:12

## 2021-08-17 RX ADMIN — PROPOFOL 50 MG: 10 INJECTION, EMULSION INTRAVENOUS at 15:06

## 2021-08-17 RX ADMIN — PROPOFOL 50 MG: 10 INJECTION, EMULSION INTRAVENOUS at 15:08

## 2021-08-17 RX ADMIN — PROPOFOL 50 MG: 10 INJECTION, EMULSION INTRAVENOUS at 15:17

## 2021-08-17 RX ADMIN — GLUCAGON HYDROCHLORIDE 0.5 MG: KIT at 15:12

## 2021-08-17 RX ADMIN — PROPOFOL 50 MG: 10 INJECTION, EMULSION INTRAVENOUS at 15:22

## 2021-08-17 RX ADMIN — PROPOFOL 50 MG: 10 INJECTION, EMULSION INTRAVENOUS at 15:29

## 2021-08-17 RX ADMIN — LIDOCAINE HYDROCHLORIDE 100 MG: 20 INJECTION, SOLUTION INTRAVENOUS at 15:04

## 2021-08-17 RX ADMIN — PROPOFOL 100 MG: 10 INJECTION, EMULSION INTRAVENOUS at 15:04

## 2021-08-17 RX ADMIN — DEXTROSE AND SODIUM CHLORIDE 30 ML/HR: 5; 450 INJECTION, SOLUTION INTRAVENOUS at 14:58

## 2021-08-17 NOTE — ANESTHESIA POSTPROCEDURE EVALUATION
Patient: Maddison Sheridan    Procedure Summary     Date: 08/17/21 Room / Location: [facility] ENDOSCOPY 1 / [facility] ENDOSCOPY    Anesthesia Start: 1501 Anesthesia Stop: 1535    Procedure: COLONOSCOPY (N/A ) Diagnosis:       Blood in stool      (Blood in stool [K92.1])    Surgeons: Babar Connell MD Provider: Cezar Real CRNA    Anesthesia Type: MAC ASA Status: 3          Anesthesia Type: MAC    Vitals  No vitals data found for the desired time range.          Post Anesthesia Care and Evaluation    Patient location during evaluation: bedside  Patient participation: complete - patient cannot participate  Level of consciousness: awake  Pain score: 0  Pain management: adequate  Airway patency: patent  Anesthetic complications: No anesthetic complications  PONV Status: none  Cardiovascular status: acceptable  Respiratory status: acceptable  Hydration status: acceptable

## 2021-08-17 NOTE — ANESTHESIA PREPROCEDURE EVALUATION
Anesthesia Evaluation     NPO Solid Status: > 8 hours  NPO Liquid Status: > 8 hours           Airway   Mallampati: III  TM distance: >3 FB  No difficulty expected  Dental - normal exam     Pulmonary - normal exam   (+) COPD, shortness of breath,   Cardiovascular - normal exam    (+) hypertension,       Neuro/Psych  (+) psychiatric history,     GI/Hepatic/Renal/Endo    (+)  GERD,      Musculoskeletal     Abdominal    Substance History      OB/GYN          Other      history of cancer                      Anesthesia Plan    ASA 3     MAC     intravenous induction     Anesthetic plan, all risks, benefits, and alternatives have been provided, discussed and informed consent has been obtained with: patient.

## 2021-08-20 LAB
LAB AP CASE REPORT: NORMAL
PATH REPORT.FINAL DX SPEC: NORMAL

## 2021-08-30 ENCOUNTER — OFFICE VISIT (OUTPATIENT)
Dept: GASTROENTEROLOGY | Facility: CLINIC | Age: 61
End: 2021-08-30

## 2021-08-30 VITALS
SYSTOLIC BLOOD PRESSURE: 154 MMHG | WEIGHT: 128 LBS | HEART RATE: 64 BPM | DIASTOLIC BLOOD PRESSURE: 88 MMHG | BODY MASS INDEX: 22.68 KG/M2 | HEIGHT: 63 IN

## 2021-08-30 DIAGNOSIS — K92.1 BLOOD IN STOOL: Primary | ICD-10-CM

## 2021-08-30 PROCEDURE — 99213 OFFICE O/P EST LOW 20 MIN: CPT | Performed by: INTERNAL MEDICINE

## 2021-08-30 NOTE — PROGRESS NOTES
[facility]      Chief Complaint:   Chief Complaint   Patient presents with   • Rectal Bleeding       Subjective     HPI:   Patient for follow-up on colonoscopy.  Patient has been having blood in her stool and was found to have a tubular adenoma in the sigmoid and hepatic flexure.  The one in the hepatic flexure was large and a clip was placed for closure.  Patient will need a repeat colonoscopy in 1 year to reevaluate for completeness of removal of this large polyp.    Plan; the patient follow-up in 1 year at that time we will schedule patient for colonoscopy to evaluate for any return of polyps    Past Medical History:   Past Medical History:   Diagnosis Date   • Abnormal weight loss    • Anxiety    • Atrophic vaginitis    • Depression    • Dysphagia     pharyngoesophageal phase      • Encounter for surgical aftercare following surgery of digestive system     Amawalk Michael (thoracic esophagectomy, lymphadenectomly, Bronchoscopy, Placement on Q. 3/1/16      • History of esophagogastroduodenoscopy     Normal hypopharynx.A tumor was found in the middle third of the esophagus.Mul.biopsies taken.Normal GE junction.Mild nonerosive gastritis in antrum.Biopsies taken.Normal pylorus and duodenum. 09/24/2015       • History of hysteroscopy     Hysteroscopy, dilation and curettage, and repair of vaginal laceration. 08/03/2013       • History of substance abuse (CMS/HCC)    • Hypertension    • Lumbosacral strain    • Myopia    • Presbyopia    • Primary malignant neoplasm of thoracic part of esophagus (CMS/HCC)     Squamous cell   • Tobacco dependence     1 ppd       Past Surgical History:    Past Surgical History:   Procedure Laterality Date   • ABDOMINAL SURGERY  03/01/2016    Abdominal portion of an Walter Michael esophagogastrectomy with jejunostomy tube placement. Esophageal carcinoma.   • COLONOSCOPY N/A 12/18/2017    Procedure: COLONOSCOPY;  Surgeon: Babar Connell MD;  Location: [facility] ENDOSCOPY;   Service:    • COLONOSCOPY N/A 8/17/2021    Procedure: COLONOSCOPY;  Surgeon: Babar Connell MD;  Location: [facility] ENDOSCOPY;  Service: Gastroenterology;  Laterality: N/A;   • ENDOSCOPY N/A 2/3/2017    Procedure: ESOPHAGOGASTRODUODENOSCOPY;  Surgeon: Babar Connell MD;  Location: [facility] ENDOSCOPY;  Service:    • ENDOSCOPY N/A 5/22/2017    Procedure: ESOPHAGOGASTRODUODENOSCOPY;  Surgeon: Babar Connell MD;  Location: [facility] ENDOSCOPY;  Service:    • ENDOSCOPY N/A 9/22/2017    Procedure: ESOPHAGOGASTRODUODENOSCOPY;  Surgeon: Babar Connell MD;  Location: [facility] ENDOSCOPY;  Service:    • ENDOSCOPY N/A 12/18/2017    Procedure: ESOPHAGOGASTRODUODENOSCOPY;  Surgeon: Babar Connell MD;  Location: [facility] ENDOSCOPY;  Service:    • ENDOSCOPY N/A 6/14/2018    Procedure: ESOPHAGOGASTRODUODENOSCOPY possible dilation;  Surgeon: Babar Connell MD;  Location: [facility] ENDOSCOPY;  Service: Gastroenterology   • ENDOSCOPY N/A 12/14/2018    Procedure: ESOPHAGOGASTRODUODENOSCOPY;  Surgeon: Babar Connell MD;  Location: [facility] ENDOSCOPY;  Service: Gastroenterology   • ENDOSCOPY N/A 12/16/2019    Procedure: ESOPHAGOGASTRODUODENOSCOPY;  Surgeon: Babar Connell MD;  Location: [facility] ENDOSCOPY;  Service: Gastroenterology   • ENDOSCOPY N/A 6/3/2020    Procedure: ESOPHAGOGASTRODUODENOSCOPY;  Surgeon: Babar Connell MD;  Location: [facility] ENDOSCOPY;  Service: Gastroenterology;  Laterality: N/A;   • ENDOSCOPY N/A 2/9/2021    Procedure: ESOPHAGOGASTRODUODENOSCOPY;  Surgeon: Babar Connell MD;  Location: [facility] ENDOSCOPY;  Service: Gastroenterology;  Laterality: N/A;   • ESOPHAGUS SURGERY      Thoracic esophagectomy (Walter Michael type).Thoracic lymphadenectomy.Fiberoptic bronchoscopy.Placement of On Q pain pump.Jejunostomy tube placement.Pyloromyotomy. 03/01/2016      • LUMBAR DISC SURGERY     • UPPER GASTROINTESTINAL ENDOSCOPY  02/03/2017   • UPPER GASTROINTESTINAL ENDOSCOPY  05/22/2017   • UPPER GASTROINTESTINAL ENDOSCOPY  09/22/2017    • UPPER GASTROINTESTINAL ENDOSCOPY  12/18/2017   • UPPER GASTROINTESTINAL ENDOSCOPY  06/14/2018   • UPPER GASTROINTESTINAL ENDOSCOPY  12/14/2018   • UPPER GASTROINTESTINAL ENDOSCOPY  02/09/2021       Family History:  Family History   Problem Relation Age of Onset   • Ovarian cancer Mother    • Cancer Mother    • Osteoporosis Other    • Heart disease Other    • Diabetes Other    • Cancer Other         Colorectal-Parent   • Asthma Other    • Asthma Sister    • Diabetes Sister    • Hypertension Sister    • Alcohol abuse Brother    • Cancer Maternal Uncle        Social History:   reports that she quit smoking about 6 years ago. Her smoking use included cigarettes. She smoked 0.50 packs per day. She has never used smokeless tobacco. She reports current alcohol use. She reports that she does not use drugs.    Medications:   Prior to Admission medications    Medication Sig Start Date End Date Taking? Authorizing Provider   albuterol (PROVENTIL HFA;VENTOLIN HFA) 108 (90 BASE) MCG/ACT inhaler Inhale 2 puffs Every 4 (Four) Hours As Needed for wheezing. 2/17/17  Yes Tiffany Jimenez MD   dexlansoprazole (Dexilant) 60 MG capsule Take 1 capsule by mouth Daily. 7/16/21  Yes Babar Connell MD   famotidine (PEPCID) 20 MG tablet Take 20 mg by mouth Daily.   Yes ProviderZoey MD   ferrous sulfate (FeroSul) 325 (65 FE) MG tablet Take 1 tablet by mouth Daily With Breakfast. 7/29/21  Yes Milagros Velasquez APRN   folic acid (FOLVITE) 1 MG tablet Take 1 tablet by mouth Daily. 7/29/21  Yes Milagros Velasquez APRN   vitamin B-12 (CYANOCOBALAMIN) 1000 MCG tablet TAKE 1 TABLET BY MOUTH DAILY 8/5/21  Yes Camilo Coleman MD       Allergies:  Penicillins and Strawberry c [ascorbate]    ROS:    Review of Systems   Constitutional: Negative for activity change, appetite change, chills, diaphoresis, fatigue, fever and unexpected weight change.   HENT: Negative for sore throat and trouble swallowing.    Respiratory: Negative for shortness of  "breath.    Gastrointestinal: Negative for abdominal distention, abdominal pain, anal bleeding, blood in stool, constipation, diarrhea, nausea, rectal pain and vomiting.   Endocrine: Negative for polydipsia, polyphagia and polyuria.   Genitourinary: Negative for difficulty urinating.   Musculoskeletal: Negative for arthralgias.   Skin: Negative for pallor.   Allergic/Immunologic: Negative for food allergies.   Neurological: Negative for weakness and light-headedness.   Psychiatric/Behavioral: Negative for behavioral problems.     Objective     Blood pressure 154/88, pulse 64, height 160 cm (63\"), weight 58.1 kg (128 lb), last menstrual period 03/17/1989, not currently breastfeeding.    Physical Exam  Constitutional:       General: She is not in acute distress.     Appearance: She is well-developed. She is not diaphoretic.   HENT:      Head: Normocephalic and atraumatic.   Cardiovascular:      Rate and Rhythm: Normal rate and regular rhythm.      Heart sounds: Normal heart sounds. No murmur heard.   No friction rub. No gallop.    Pulmonary:      Effort: No respiratory distress.      Breath sounds: Normal breath sounds. No wheezing or rales.   Chest:      Chest wall: No tenderness.   Abdominal:      General: Bowel sounds are normal. There is no distension.      Palpations: Abdomen is soft. There is no mass.      Tenderness: There is no abdominal tenderness. There is no guarding or rebound.      Hernia: No hernia is present.   Musculoskeletal:         General: Normal range of motion.   Skin:     General: Skin is warm and dry.      Coloration: Skin is not pale.      Findings: No erythema or rash.   Neurological:      Mental Status: She is alert and oriented to person, place, and time.   Psychiatric:         Behavior: Behavior normal.         Thought Content: Thought content normal.         Judgment: Judgment normal.          Assessment/Plan   Diagnoses and all orders for this visit:    1. Blood in stool " (Primary)        * Surgery not found *     Diagnosis Plan   1. Blood in stool         Anticipated Surgical Procedure:  No orders of the defined types were placed in this encounter.      The risks, benefits, and alternatives of this procedure have been discussed with the patient or the responsible party- the patient understands and agrees to proceed.

## 2021-10-28 ENCOUNTER — LAB (OUTPATIENT)
Dept: ONCOLOGY | Facility: HOSPITAL | Age: 61
End: 2021-10-28

## 2021-10-28 ENCOUNTER — OFFICE VISIT (OUTPATIENT)
Dept: ONCOLOGY | Facility: CLINIC | Age: 61
End: 2021-10-28

## 2021-10-28 VITALS
SYSTOLIC BLOOD PRESSURE: 123 MMHG | HEART RATE: 68 BPM | TEMPERATURE: 97.9 F | OXYGEN SATURATION: 96 % | BODY MASS INDEX: 22.57 KG/M2 | WEIGHT: 127.4 LBS | DIASTOLIC BLOOD PRESSURE: 79 MMHG

## 2021-10-28 DIAGNOSIS — C15.4 MALIGNANT NEOPLASM OF THORACIC ESOPHAGUS (HCC): Primary | Chronic | ICD-10-CM

## 2021-10-28 DIAGNOSIS — E61.1 IRON DEFICIENCY: ICD-10-CM

## 2021-10-28 DIAGNOSIS — E61.1 IRON DEFICIENCY: Chronic | ICD-10-CM

## 2021-10-28 LAB
ALBUMIN SERPL-MCNC: 4.4 G/DL (ref 3.5–5.2)
ALBUMIN/GLOB SERPL: 1.8 G/DL
ALP SERPL-CCNC: 113 U/L (ref 39–117)
ALT SERPL W P-5'-P-CCNC: 11 U/L (ref 1–33)
ANION GAP SERPL CALCULATED.3IONS-SCNC: 9 MMOL/L (ref 5–15)
AST SERPL-CCNC: 14 U/L (ref 1–32)
BASOPHILS # BLD AUTO: 0.03 10*3/MM3 (ref 0–0.2)
BASOPHILS NFR BLD AUTO: 0.6 % (ref 0–1.5)
BILIRUB SERPL-MCNC: 0.6 MG/DL (ref 0–1.2)
BUN SERPL-MCNC: 19 MG/DL (ref 8–23)
BUN/CREAT SERPL: 19.6 (ref 7–25)
CALCIUM SPEC-SCNC: 9.1 MG/DL (ref 8.6–10.5)
CHLORIDE SERPL-SCNC: 104 MMOL/L (ref 98–107)
CO2 SERPL-SCNC: 27 MMOL/L (ref 22–29)
CREAT SERPL-MCNC: 0.97 MG/DL (ref 0.57–1)
DEPRECATED RDW RBC AUTO: 45.1 FL (ref 37–54)
EOSINOPHIL # BLD AUTO: 0.07 10*3/MM3 (ref 0–0.4)
EOSINOPHIL NFR BLD AUTO: 1.3 % (ref 0.3–6.2)
ERYTHROCYTE [DISTWIDTH] IN BLOOD BY AUTOMATED COUNT: 12.7 % (ref 12.3–15.4)
FERRITIN SERPL-MCNC: 74.35 NG/ML (ref 13–150)
FOLATE SERPL-MCNC: 15.7 NG/ML (ref 4.78–24.2)
GFR SERPL CREATININE-BSD FRML MDRD: 70 ML/MIN/1.73
GLOBULIN UR ELPH-MCNC: 2.5 GM/DL
GLUCOSE SERPL-MCNC: 85 MG/DL (ref 65–99)
HCT VFR BLD AUTO: 44.8 % (ref 34–46.6)
HGB BLD-MCNC: 14.7 G/DL (ref 12–15.9)
IMM GRANULOCYTES # BLD AUTO: 0.01 10*3/MM3 (ref 0–0.05)
IMM GRANULOCYTES NFR BLD AUTO: 0.2 % (ref 0–0.5)
IRON 24H UR-MRATE: 109 MCG/DL (ref 37–145)
IRON SATN MFR SERPL: 28 % (ref 20–50)
LYMPHOCYTES # BLD AUTO: 1.31 10*3/MM3 (ref 0.7–3.1)
LYMPHOCYTES NFR BLD AUTO: 25.1 % (ref 19.6–45.3)
MCH RBC QN AUTO: 31.3 PG (ref 26.6–33)
MCHC RBC AUTO-ENTMCNC: 32.8 G/DL (ref 31.5–35.7)
MCV RBC AUTO: 95.3 FL (ref 79–97)
MONOCYTES # BLD AUTO: 0.62 10*3/MM3 (ref 0.1–0.9)
MONOCYTES NFR BLD AUTO: 11.9 % (ref 5–12)
NEUTROPHILS NFR BLD AUTO: 3.17 10*3/MM3 (ref 1.7–7)
NEUTROPHILS NFR BLD AUTO: 60.9 % (ref 42.7–76)
NRBC BLD AUTO-RTO: 0 /100 WBC (ref 0–0.2)
PLATELET # BLD AUTO: 236 10*3/MM3 (ref 140–450)
PMV BLD AUTO: 11.4 FL (ref 6–12)
POTASSIUM SERPL-SCNC: 3.9 MMOL/L (ref 3.5–5.2)
PROT SERPL-MCNC: 6.9 G/DL (ref 6–8.5)
RBC # BLD AUTO: 4.7 10*6/MM3 (ref 3.77–5.28)
SODIUM SERPL-SCNC: 140 MMOL/L (ref 136–145)
TIBC SERPL-MCNC: 393 MCG/DL (ref 298–536)
TRANSFERRIN SERPL-MCNC: 264 MG/DL (ref 200–360)
VIT B12 BLD-MCNC: 1820 PG/ML (ref 211–946)
WBC # BLD AUTO: 5.21 10*3/MM3 (ref 3.4–10.8)

## 2021-10-28 PROCEDURE — 80053 COMPREHEN METABOLIC PANEL: CPT

## 2021-10-28 PROCEDURE — 1126F AMNT PAIN NOTED NONE PRSNT: CPT | Performed by: INTERNAL MEDICINE

## 2021-10-28 PROCEDURE — G0463 HOSPITAL OUTPT CLINIC VISIT: HCPCS | Performed by: INTERNAL MEDICINE

## 2021-10-28 PROCEDURE — 82607 VITAMIN B-12: CPT

## 2021-10-28 PROCEDURE — 1123F ACP DISCUSS/DSCN MKR DOCD: CPT | Performed by: INTERNAL MEDICINE

## 2021-10-28 PROCEDURE — 85025 COMPLETE CBC W/AUTO DIFF WBC: CPT

## 2021-10-28 PROCEDURE — 82746 ASSAY OF FOLIC ACID SERUM: CPT

## 2021-10-28 PROCEDURE — 83540 ASSAY OF IRON: CPT

## 2021-10-28 PROCEDURE — 84466 ASSAY OF TRANSFERRIN: CPT

## 2021-10-28 PROCEDURE — 99214 OFFICE O/P EST MOD 30 MIN: CPT | Performed by: INTERNAL MEDICINE

## 2021-10-28 PROCEDURE — 82728 ASSAY OF FERRITIN: CPT

## 2021-10-28 NOTE — PROGRESS NOTES
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  Had a colonoscopy done since last clinic visit secondary to blood in stool and was found to have 2 polyps which were removed.  Denies any new lymph node enlargement.  Denies any worsening difficulty swallowing.  Denies any bleeding.  Denies any major weight loss or any drenching night sweats.      Past Medical History, Past Surgical History, Social History, Family History have been reviewed and are without significant changes except as mentioned.    Review of Systems   A comprehensive 14 point review of systems was performed and was negative except as mentioned in HPI.    Medications:  The current medication list was reviewed in the EMR    ALLERGIES:    Allergies   Allergen Reactions   • Penicillins Hives   • Strawberry C [Ascorbate] Hives       Objective      Vitals:    10/28/21 1131   BP: 123/79   Pulse: 68   Temp: 97.9 °F (36.6 °C)   SpO2: 96%   Weight: 57.8 kg (127 lb 6.4 oz)   PainSc: 0-No pain     Current Status 4/1/2021   ECOG score 0       Physical Exam  Pulmonary:      Breath sounds: Normal breath sounds.   Neurological:      Mental Status: She is alert and oriented to person, place, and time.           RECENT LABS:  Glucose   Date Value Ref Range Status   10/28/2021 85 65 - 99 mg/dL Final     Sodium   Date Value Ref Range Status   10/28/2021 140 136 - 145 mmol/L Final     Potassium   Date Value Ref Range Status   10/28/2021 3.9 3.5 - 5.2 mmol/L Final     CO2   Date Value Ref Range Status   10/28/2021 27.0 22.0 - 29.0 mmol/L Final     Chloride   Date Value Ref Range Status   10/28/2021 104 98 - 107 mmol/L Final      Anion Gap   Date Value Ref Range Status   10/28/2021 9.0 5.0 - 15.0 mmol/L Final     Creatinine   Date Value Ref Range Status   10/28/2021 0.97 0.57 - 1.00 mg/dL Final     BUN   Date Value Ref Range Status   10/28/2021 19 8 - 23 mg/dL Final     BUN/Creatinine Ratio   Date Value Ref Range Status   10/28/2021 19.6 7.0 - 25.0 Final     Calcium   Date Value Ref Range Status   10/28/2021 9.1 8.6 - 10.5 mg/dL Final     Alkaline Phosphatase   Date Value Ref Range Status   10/28/2021 113 39 - 117 U/L Final     Total Protein   Date Value Ref Range Status   10/28/2021 6.9 6.0 - 8.5 g/dL Final     ALT (SGPT)   Date Value Ref Range Status   10/28/2021 11 1 - 33 U/L Final     AST (SGOT)   Date Value Ref Range Status   10/28/2021 14 1 - 32 U/L Final     Total Bilirubin   Date Value Ref Range Status   10/28/2021 0.6 0.0 - 1.2 mg/dL Final     Albumin   Date Value Ref Range Status   10/28/2021 4.40 3.50 - 5.20 g/dL Final     Globulin   Date Value Ref Range Status   10/28/2021 2.5 gm/dL Final     Lab Results   Component Value Date    WBC 5.21 10/28/2021    HGB 14.7 10/28/2021    HCT 44.8 10/28/2021    MCV 95.3 10/28/2021     10/28/2021     Lab Results   Component Value Date    NEUTROABS 3.17 10/28/2021    IRON 109 10/28/2021    IRON 126 07/29/2021    IRON 96 04/01/2021    TIBC 393 10/28/2021    TIBC 368 07/29/2021    TIBC 395 04/01/2021    LABIRON 28 10/28/2021    LABIRON 34 07/29/2021    LABIRON 24 04/01/2021    FERRITIN 74.35 10/28/2021    FERRITIN 76.25 07/29/2021    FERRITIN 55.18 04/01/2021    XZBRIAAI30 1,820 (H) 10/28/2021    OQVXZNCT15 1,082 (H) 07/29/2021    VXGOCFCW13 853 04/01/2021    FOLATE 15.70 10/28/2021    FOLATE >20.00 07/29/2021    FOLATE >20.00 04/01/2021     No results found for: , LABCA2, AFPTM, HCGQUANT, , CHROMGRNA, [id number], CEA, REFLABREPO      PATHOLOGY:  * Cannot find OR log *         RADIOLOGY DATA :  No radiology results for the last 7 days        Assessment/Plan     1.  Squamous  cell cancer of the midesophagus  -Patient was diagnosed in September 2015  -Had a concurrent chemoradiation with carboplatin Taxol that completed in January 2016  -Had esophagectomy done on March 1, 2016 that showed complete pathologic response.  Patient has been on surveillance since then  -EGD done in February 2021 was negative for any recurrence.  -No need for any routine CT scan at present  -Patient will return to clinic in 4 months with CBC, CMP, iron studies, ferritin, B12 and folate to be done on that day.      2.  Iron deficiency:  -Patient was found to have iron deficiency in December 2020.  Patient is currently on ferrous sulfate 1 tablet p.o. daily along with B12 and folic acid p.o. daily  -Anemia work-up today shows hemoglobin is 14.7.  B12 level is higher than normal, recommend stopping B12 supplement until next clinic visit in 4 months.    3.  Hypertension    4.  Symptoms: Patient does not smoke    5. Advance Care Planning: For now patient remains full code and is able to make decisions.  Patient has health care surrogate mentioned on chart.             PHQ-9 Total Score: 0   -Patient is not homicidal or suicidal.  No acute intervention required.    Maddison Sheridan reports a pain score of 0.  Given her pain assessment as noted, treatment options were discussed and the following options were decided upon as a follow-up plan to address the patient's pain: continuation of current treatment plan for pain.         Camilo Coleman MD  10/29/2021  06:59 CDT        Part of this note may be an electronic transcription/translation of spoken language to printed text using the Dragon Dictation System.          CC:

## 2021-10-29 ENCOUNTER — TELEPHONE (OUTPATIENT)
Dept: ONCOLOGY | Facility: HOSPITAL | Age: 61
End: 2021-10-29

## 2021-10-29 NOTE — TELEPHONE ENCOUNTER
----- Message from Camilo Coleman MD sent at 10/29/2021  7:00 AM CDT -----  Regarding: labs  Please let patient know, she needs to continue taking ferrous sulfate and folic acid p.o. daily.  Recommend stopping B12 supplement until next clinic visit in 4 months.  Thank you

## 2021-11-29 ENCOUNTER — TELEPHONE (OUTPATIENT)
Dept: GASTROENTEROLOGY | Facility: CLINIC | Age: 61
End: 2021-11-29

## 2021-11-29 RX ORDER — FAMOTIDINE 20 MG/1
TABLET, FILM COATED ORAL
Qty: 30 TABLET | OUTPATIENT
Start: 2021-11-29

## 2021-11-29 RX ORDER — FAMOTIDINE 20 MG/1
20 TABLET, FILM COATED ORAL DAILY
Qty: 30 TABLET | Refills: 5 | Status: SHIPPED | OUTPATIENT
Start: 2021-11-29 | End: 2022-02-22 | Stop reason: SDUPTHER

## 2021-11-29 NOTE — TELEPHONE ENCOUNTER
11/29/2021, 1241 - Patient telephone beatriz returned per this staff member [phone].  Patient stated prescription medication Famotidine 20 MG Tablets in need of a Prior Authorization.  Patient made aware this staff member will submit Prior Authorization as of today, Monday, November 289, 2021.  Patient verbalized understanding.    11/29/2021, 1255 - [facility] per this staff member [phone].  Spoke with pharmacy personnel, Peggy.  Peggy made aware Prior Authorization attempted for prescription mediation Famotidine 20 MG Tablets via Cover My Meds, however, notification received stating prescription medication available without Prior Authorization.  Peggy stated prescription medication in need of renewal not Prior Authorization.  Verbal approval received per Dr. Babar Hernandez M.D. for following prescription medication - Famotidine 20 MG Tablets, 1 tablet by mouth daily, #30, 5 renewals.     11/29/2021, 1314 - Patient telephoned per this staff member [phone] with notification prescription medication available without Prior Authorization and [facility]  Patient verbalized understanding.

## 2021-11-29 NOTE — TELEPHONE ENCOUNTER
----- Message from Doug Cash sent at 11/29/2021 10:39 AM CST -----  Pt. Called wanting to know about a prior auth on a medication. Can you please give her a call back. [phone]. Thanks AD

## 2022-02-04 NOTE — TELEPHONE ENCOUNTER
Rx Refill Note  Requested Prescriptions     Pending Prescriptions Disp Refills   • FeroSul 325 (65 Fe) MG tablet [Pharmacy Med Name: FERROUS SULFATE 325MG (5GR) TABS] 30 tablet 3     Sig: TAKE 1 TABLET BY MOUTH EVERY MORNING WITH BREAKFAST      Last office visit with prescribing clinician: 7/29/2021      Next office visit with prescribing clinician: Visit date not found            Sheree Price RN  02/04/22, 17:34 CST

## 2022-02-04 NOTE — TELEPHONE ENCOUNTER
Rx Refill Note  Requested Prescriptions     Pending Prescriptions Disp Refills   • vitamin B-12 (CYANOCOBALAMIN) 1000 MCG tablet [Pharmacy Med Name: VITAMIN B-12 1000MCG TABLETS] 90 tablet 1     Sig: TAKE 1 TABLET BY MOUTH DAILY      Last office visit with prescribing clinician: 10/28/2021      Next office visit with prescribing clinician: 2/24/2022            Sheree Price RN  02/04/22, 09:28 CST

## 2022-02-07 RX ORDER — FERROUS SULFATE 325(65) MG
TABLET ORAL
Qty: 30 TABLET | Refills: 3 | Status: SHIPPED | OUTPATIENT
Start: 2022-02-07 | End: 2022-03-25 | Stop reason: SDUPTHER

## 2022-02-07 RX ORDER — LANOLIN ALCOHOL/MO/W.PET/CERES
1000 CREAM (GRAM) TOPICAL DAILY
Qty: 90 TABLET | Refills: 1 | Status: SHIPPED | OUTPATIENT
Start: 2022-02-07 | End: 2022-03-25

## 2022-02-22 RX ORDER — DEXLANSOPRAZOLE 60 MG/1
60 CAPSULE, DELAYED RELEASE ORAL DAILY
Qty: 30 CAPSULE | Refills: 5 | Status: SHIPPED | OUTPATIENT
Start: 2022-02-22 | End: 2022-05-11 | Stop reason: SDUPTHER

## 2022-02-22 RX ORDER — FAMOTIDINE 20 MG/1
20 TABLET, FILM COATED ORAL DAILY
Qty: 30 TABLET | Refills: 5 | Status: SHIPPED | OUTPATIENT
Start: 2022-02-22 | End: 2022-11-30 | Stop reason: SDUPTHER

## 2022-03-23 ENCOUNTER — APPOINTMENT (OUTPATIENT)
Dept: ONCOLOGY | Facility: HOSPITAL | Age: 62
End: 2022-03-23

## 2022-03-24 ENCOUNTER — LAB (OUTPATIENT)
Dept: ONCOLOGY | Facility: HOSPITAL | Age: 62
End: 2022-03-24

## 2022-03-24 ENCOUNTER — OFFICE VISIT (OUTPATIENT)
Dept: ONCOLOGY | Facility: CLINIC | Age: 62
End: 2022-03-24

## 2022-03-24 VITALS
DIASTOLIC BLOOD PRESSURE: 82 MMHG | OXYGEN SATURATION: 96 % | WEIGHT: 132.5 LBS | BODY MASS INDEX: 23.47 KG/M2 | SYSTOLIC BLOOD PRESSURE: 144 MMHG | HEART RATE: 68 BPM | TEMPERATURE: 97.7 F

## 2022-03-24 DIAGNOSIS — C15.4 MALIGNANT NEOPLASM OF THORACIC ESOPHAGUS: ICD-10-CM

## 2022-03-24 DIAGNOSIS — E61.1 IRON DEFICIENCY: Chronic | ICD-10-CM

## 2022-03-24 DIAGNOSIS — C15.4 MALIGNANT NEOPLASM OF THORACIC ESOPHAGUS: Primary | Chronic | ICD-10-CM

## 2022-03-24 DIAGNOSIS — E61.1 IRON DEFICIENCY: ICD-10-CM

## 2022-03-24 LAB
ALBUMIN SERPL-MCNC: 4.3 G/DL (ref 3.5–5.2)
ALBUMIN/GLOB SERPL: 1.5 G/DL
ALP SERPL-CCNC: 108 U/L (ref 39–117)
ALT SERPL W P-5'-P-CCNC: 13 U/L (ref 1–33)
ANION GAP SERPL CALCULATED.3IONS-SCNC: 8 MMOL/L (ref 5–15)
AST SERPL-CCNC: 16 U/L (ref 1–32)
BASOPHILS # BLD AUTO: 0.03 10*3/MM3 (ref 0–0.2)
BASOPHILS NFR BLD AUTO: 0.5 % (ref 0–1.5)
BILIRUB SERPL-MCNC: 0.7 MG/DL (ref 0–1.2)
BUN SERPL-MCNC: 14 MG/DL (ref 8–23)
BUN/CREAT SERPL: 15.1 (ref 7–25)
CALCIUM SPEC-SCNC: 9.2 MG/DL (ref 8.6–10.5)
CHLORIDE SERPL-SCNC: 104 MMOL/L (ref 98–107)
CO2 SERPL-SCNC: 28 MMOL/L (ref 22–29)
CREAT SERPL-MCNC: 0.93 MG/DL (ref 0.57–1)
DEPRECATED RDW RBC AUTO: 45.4 FL (ref 37–54)
EGFRCR SERPLBLD CKD-EPI 2021: 69.2 ML/MIN/1.73
EOSINOPHIL # BLD AUTO: 0.06 10*3/MM3 (ref 0–0.4)
EOSINOPHIL NFR BLD AUTO: 1 % (ref 0.3–6.2)
ERYTHROCYTE [DISTWIDTH] IN BLOOD BY AUTOMATED COUNT: 12.9 % (ref 12.3–15.4)
FERRITIN SERPL-MCNC: 119.4 NG/ML (ref 13–150)
FOLATE SERPL-MCNC: >20 NG/ML (ref 4.78–24.2)
GLOBULIN UR ELPH-MCNC: 2.8 GM/DL
GLUCOSE SERPL-MCNC: 120 MG/DL (ref 65–99)
HCT VFR BLD AUTO: 44 % (ref 34–46.6)
HGB BLD-MCNC: 14.5 G/DL (ref 12–15.9)
IMM GRANULOCYTES # BLD AUTO: 0.01 10*3/MM3 (ref 0–0.05)
IMM GRANULOCYTES NFR BLD AUTO: 0.2 % (ref 0–0.5)
IRON 24H UR-MRATE: 81 MCG/DL (ref 37–145)
IRON SATN MFR SERPL: 21 % (ref 20–50)
LYMPHOCYTES # BLD AUTO: 1.28 10*3/MM3 (ref 0.7–3.1)
LYMPHOCYTES NFR BLD AUTO: 22.1 % (ref 19.6–45.3)
MCH RBC QN AUTO: 31.6 PG (ref 26.6–33)
MCHC RBC AUTO-ENTMCNC: 33 G/DL (ref 31.5–35.7)
MCV RBC AUTO: 95.9 FL (ref 79–97)
MONOCYTES # BLD AUTO: 0.53 10*3/MM3 (ref 0.1–0.9)
MONOCYTES NFR BLD AUTO: 9.1 % (ref 5–12)
NEUTROPHILS NFR BLD AUTO: 3.89 10*3/MM3 (ref 1.7–7)
NEUTROPHILS NFR BLD AUTO: 67.1 % (ref 42.7–76)
NRBC BLD AUTO-RTO: 0 /100 WBC (ref 0–0.2)
PLATELET # BLD AUTO: 227 10*3/MM3 (ref 140–450)
PMV BLD AUTO: 11.6 FL (ref 6–12)
POTASSIUM SERPL-SCNC: 4.4 MMOL/L (ref 3.5–5.2)
PROT SERPL-MCNC: 7.1 G/DL (ref 6–8.5)
RBC # BLD AUTO: 4.59 10*6/MM3 (ref 3.77–5.28)
SODIUM SERPL-SCNC: 140 MMOL/L (ref 136–145)
TIBC SERPL-MCNC: 393 MCG/DL (ref 298–536)
TRANSFERRIN SERPL-MCNC: 264 MG/DL (ref 200–360)
VIT B12 BLD-MCNC: 1719 PG/ML (ref 211–946)
WBC NRBC COR # BLD: 5.8 10*3/MM3 (ref 3.4–10.8)

## 2022-03-24 PROCEDURE — G0463 HOSPITAL OUTPT CLINIC VISIT: HCPCS | Performed by: INTERNAL MEDICINE

## 2022-03-24 PROCEDURE — 83540 ASSAY OF IRON: CPT

## 2022-03-24 PROCEDURE — 85025 COMPLETE CBC W/AUTO DIFF WBC: CPT

## 2022-03-24 PROCEDURE — 80053 COMPREHEN METABOLIC PANEL: CPT

## 2022-03-24 PROCEDURE — 84466 ASSAY OF TRANSFERRIN: CPT

## 2022-03-24 PROCEDURE — 82728 ASSAY OF FERRITIN: CPT

## 2022-03-24 PROCEDURE — 99214 OFFICE O/P EST MOD 30 MIN: CPT | Performed by: INTERNAL MEDICINE

## 2022-03-24 PROCEDURE — 82607 VITAMIN B-12: CPT

## 2022-03-24 PROCEDURE — 82746 ASSAY OF FOLIC ACID SERUM: CPT

## 2022-03-24 PROCEDURE — 1123F ACP DISCUSS/DSCN MKR DOCD: CPT | Performed by: INTERNAL MEDICINE

## 2022-03-24 PROCEDURE — 1126F AMNT PAIN NOTED NONE PRSNT: CPT | Performed by: INTERNAL MEDICINE

## 2022-03-24 RX ORDER — FAMOTIDINE 20 MG/1
20 TABLET, FILM COATED ORAL DAILY
Qty: 30 TABLET | Refills: 5 | Status: CANCELLED | OUTPATIENT
Start: 2022-03-24

## 2022-03-24 RX ORDER — FOLIC ACID 1 MG/1
1 TABLET ORAL DAILY
Qty: 90 TABLET | Refills: 1 | Status: CANCELLED | OUTPATIENT
Start: 2022-03-24

## 2022-03-24 RX ORDER — LANOLIN ALCOHOL/MO/W.PET/CERES
1000 CREAM (GRAM) TOPICAL DAILY
Qty: 90 TABLET | Refills: 1 | Status: CANCELLED | OUTPATIENT
Start: 2022-03-24

## 2022-03-24 RX ORDER — FERROUS SULFATE 325(65) MG
1 TABLET ORAL
Qty: 30 TABLET | Refills: 3 | Status: CANCELLED | OUTPATIENT
Start: 2022-03-24

## 2022-03-24 RX ORDER — DEXLANSOPRAZOLE 60 MG/1
60 CAPSULE, DELAYED RELEASE ORAL DAILY
Qty: 30 CAPSULE | Refills: 5 | Status: CANCELLED | OUTPATIENT
Start: 2022-03-24

## 2022-03-24 NOTE — PROGRESS NOTES
DATE OF VISIT: 3/25/2022      REASON FOR VISIT: Esophageal cancer history on surveillance, iron deficiency      HISTORY OF PRESENT ILLNESS:   61-year-old female with medical problem consisting of moderately differentiated squamous cell cancer of middle third of right esophagus diagnosed in September 2015 currently on surveillance, anxiety, hypertension, iron deficiency is here for follow-up appointment today.  Denies any bleeding.  Denies any new lymph node enlargement.  Denies any recent worsening of difficulty swallowing.  Denies any major weight loss or drenching night sweats.      Past Medical History, Past Surgical History, Social History, Family History have been reviewed and are without significant changes except as mentioned.    Review of Systems   Constitutional:        Has gained 4 pounds since last clinic visit      A comprehensive 14 point review of systems was performed and was negative except as mentioned in HPI.    Medications:  The current medication list was reviewed in the EMR    ALLERGIES:    Allergies   Allergen Reactions   • Penicillins Hives   • Strawberry C [Ascorbate] Hives       Objective      Vitals:    03/24/22 1033   BP: 144/82   Pulse: 68   Temp: 97.7 °F (36.5 °C)   TempSrc: Temporal   SpO2: 96%   Weight: 60.1 kg (132 lb 8 oz)   PainSc: 0-No pain     Current Status 4/1/2021   ECOG score 0       Physical Exam  Pulmonary:      Breath sounds: Normal breath sounds.   Neurological:      Mental Status: She is alert and oriented to person, place, and time.           RECENT LABS:  Glucose   Date Value Ref Range Status   03/24/2022 120 (H) 65 - 99 mg/dL Final     Sodium   Date Value Ref Range Status   03/24/2022 140 136 - 145 mmol/L Final     Potassium   Date Value Ref Range Status   03/24/2022 4.4 3.5 - 5.2 mmol/L Final     CO2   Date Value Ref Range Status   03/24/2022 28.0 22.0 - 29.0 mmol/L Final     Chloride   Date Value Ref Range Status   03/24/2022 104 98 - 107 mmol/L Final     Anion Gap    Date Value Ref Range Status   03/24/2022 8.0 5.0 - 15.0 mmol/L Final     Creatinine   Date Value Ref Range Status   03/24/2022 0.93 0.57 - 1.00 mg/dL Final     BUN   Date Value Ref Range Status   03/24/2022 14 8 - 23 mg/dL Final     BUN/Creatinine Ratio   Date Value Ref Range Status   03/24/2022 15.1 7.0 - 25.0 Final     Calcium   Date Value Ref Range Status   03/24/2022 9.2 8.6 - 10.5 mg/dL Final     Alkaline Phosphatase   Date Value Ref Range Status   03/24/2022 108 39 - 117 U/L Final     Total Protein   Date Value Ref Range Status   03/24/2022 7.1 6.0 - 8.5 g/dL Final     ALT (SGPT)   Date Value Ref Range Status   03/24/2022 13 1 - 33 U/L Final     AST (SGOT)   Date Value Ref Range Status   03/24/2022 16 1 - 32 U/L Final     Total Bilirubin   Date Value Ref Range Status   03/24/2022 0.7 0.0 - 1.2 mg/dL Final     Albumin   Date Value Ref Range Status   03/24/2022 4.30 3.50 - 5.20 g/dL Final     Globulin   Date Value Ref Range Status   03/24/2022 2.8 gm/dL Final     Lab Results   Component Value Date    WBC 5.80 03/24/2022    HGB 14.5 03/24/2022    HCT 44.0 03/24/2022    MCV 95.9 03/24/2022     03/24/2022     Lab Results   Component Value Date    NEUTROABS 3.89 03/24/2022    IRON 81 03/24/2022    IRON 109 10/28/2021    IRON 126 07/29/2021    TIBC 393 03/24/2022    TIBC 393 10/28/2021    TIBC 368 07/29/2021    LABIRON 21 03/24/2022    LABIRON 28 10/28/2021    LABIRON 34 07/29/2021    FERRITIN 119.40 03/24/2022    FERRITIN 74.35 10/28/2021    FERRITIN 76.25 07/29/2021    PXZVZCKP62 1,719 (H) 03/24/2022    ILDNQSNO49 1,820 (H) 10/28/2021    YBBTQKCT64 1,082 (H) 07/29/2021    FOLATE >20.00 03/24/2022    FOLATE 15.70 10/28/2021    FOLATE >20.00 07/29/2021     No results found for: , LABCA2, AFPTM, HCGQUANT, , CHROMGRNA, [id number], CEA, REFLABREPO      PATHOLOGY:  * Cannot find OR log *         RADIOLOGY DATA :  No radiology results for the last 7 days        Assessment/Plan     1.  Squamous cell  cancer of mid esophagus:  -Patient was diagnosed in September 2015  -Had a concurrent chemoradiation with carboplatin and Taxol that completed in January 2016  -Had esophagectomy done on March 1, 2016 that showed complete pathologic response.  Has been on surveillance since then  -Most recent EGD in February 2021 was negative for recurrence  -Since patient is 5 years out from diagnosis, no need for any routine surveillance CT scan which has been discussed with patient  -Patient will return to clinic in 4 months with repeat CBC, CMP, iron studies, ferritin, B12 and folate to be done on that day.    2.  Iron deficiency  -Patient was found to have iron deficiency in December 2020  -Patient is currently on ferrous sulfate B12 and folic acid p.o. daily  -Anemia work-up done today shows hemoglobin is 14.5.  Iron saturation is 21%.  Recommend continue with ferrous sulfate p.o. daily.  B12 level is 1700, will stop B12 supplement until next clinic visit.  Folate level is also showing improvement will decrease folic acid to 3 times a week.    3.  Health maintenance: Patient does not smoke    4. Advance Care Planning: For now patient remains full code and is able to make decisions.  Patient has health care surrogate mentioned on chart.    5.  Prescriptions: Prescription for ferrous sulfate,  and folic acid has been sent to her pharmacy today             PHQ-9 Total Score: 0   -Patient is not homicidal or suicidal.  No acute intervention required.    Maddison Sheridan reports a pain score of 0.  Given her pain assessment as noted, treatment options were discussed and the following options were decided upon as a follow-up plan to address the patient's pain: continuation of current treatment plan for pain.         Camilo Coleman MD  3/25/2022  07:19 CDT        Part of this note may be an electronic transcription/translation of spoken language to printed text using the Dragon Dictation System.          CC:

## 2022-03-25 RX ORDER — FOLIC ACID 1 MG/1
TABLET ORAL
Qty: 36 TABLET | Refills: 1 | Status: SHIPPED | OUTPATIENT
Start: 2022-03-25 | End: 2022-07-21 | Stop reason: SDUPTHER

## 2022-03-25 RX ORDER — FERROUS SULFATE 325(65) MG
1 TABLET ORAL
Qty: 30 TABLET | Refills: 3 | Status: SHIPPED | OUTPATIENT
Start: 2022-03-25 | End: 2022-06-08

## 2022-03-28 ENCOUNTER — TELEPHONE (OUTPATIENT)
Dept: ONCOLOGY | Facility: CLINIC | Age: 62
End: 2022-03-28

## 2022-03-28 NOTE — TELEPHONE ENCOUNTER
----- Message from Camilo Coleman MD sent at 3/25/2022  7:21 AM CDT -----  Regarding: labs  Please let patient know, she needs to continue taking ferrous sulfate p.o. daily.  B12 level is significantly higher.  Recommend stopping B12 supplement until next clinic visit.  Folate level is also showing improvement, recommend decreasing folic acid to 3 times a week on Monday, Wednesday and Friday only.  Prescription for ferrous sulfate and folic acid has been sent to her pharmacy today.  Thank you

## 2022-05-11 ENCOUNTER — TELEPHONE (OUTPATIENT)
Dept: GASTROENTEROLOGY | Facility: CLINIC | Age: 62
End: 2022-05-11

## 2022-05-11 RX ORDER — DEXLANSOPRAZOLE 60 MG/1
60 CAPSULE, DELAYED RELEASE ORAL DAILY
Qty: 30 CAPSULE | Refills: 5 | Status: SHIPPED | OUTPATIENT
Start: 2022-05-11 | End: 2022-12-22 | Stop reason: SDUPTHER

## 2022-05-11 NOTE — TELEPHONE ENCOUNTER
----- Message from Doug Cash sent at 5/11/2022  8:43 AM CDT -----  Pt called at 8:41 stating that she needs her dexlansoprazole (Dexilant) 60 MG capsule Refilled. She now uses [facility] in [place] [facility]. She said that she is totally out and is needing them filled today if at all possible.

## 2022-05-11 NOTE — TELEPHONE ENCOUNTER
"05/11/2022, 1150 - Patient telephoned per this staff member [phone].  Zero answer.  Zero option to submit voice message - \"the number you dialed has been changed, disconnected, or is no longer in service\".    Rationale for speaking with patient - Notification Dexlansoprazole 60 MG Capsules, 1 capsule by mouth daily, #30, 5 renewals has been submitted to [facility], [place] and reminder of 1 year clinical appointment with Dr. Babar Hernandez M.D. Friday, September 2, 2022 at 1:15 P.M.  "

## 2022-06-08 RX ORDER — FERROUS SULFATE 325(65) MG
TABLET ORAL
Qty: 30 TABLET | Refills: 3 | Status: SHIPPED | OUTPATIENT
Start: 2022-06-08 | End: 2022-07-21 | Stop reason: SDUPTHER

## 2022-06-08 NOTE — TELEPHONE ENCOUNTER
Rx Refill Note  Requested Prescriptions     Pending Prescriptions Disp Refills   • FeroSul 325 (65 Fe) MG tablet [Pharmacy Med Name: FERROUS SULFATE 325MG (5GR) TABS] 30 tablet 3     Sig: TAKE 1 TABLET BY MOUTH DAILY WITH BREAKFAST      Last office visit with prescribing clinician: 3/24/2022      Next office visit with prescribing clinician: 7/21/2022            Luiza Arias RN  06/08/22, 10:03 CDT

## 2022-07-21 ENCOUNTER — OFFICE VISIT (OUTPATIENT)
Dept: ONCOLOGY | Facility: CLINIC | Age: 62
End: 2022-07-21

## 2022-07-21 ENCOUNTER — LAB (OUTPATIENT)
Dept: ONCOLOGY | Facility: HOSPITAL | Age: 62
End: 2022-07-21

## 2022-07-21 VITALS
BODY MASS INDEX: 23.45 KG/M2 | DIASTOLIC BLOOD PRESSURE: 94 MMHG | HEART RATE: 58 BPM | OXYGEN SATURATION: 100 % | SYSTOLIC BLOOD PRESSURE: 166 MMHG | TEMPERATURE: 96.9 F | WEIGHT: 132.4 LBS

## 2022-07-21 DIAGNOSIS — E61.1 IRON DEFICIENCY: ICD-10-CM

## 2022-07-21 DIAGNOSIS — E61.1 IRON DEFICIENCY: Chronic | ICD-10-CM

## 2022-07-21 DIAGNOSIS — C15.4 MALIGNANT NEOPLASM OF THORACIC ESOPHAGUS: ICD-10-CM

## 2022-07-21 DIAGNOSIS — C15.4 MALIGNANT NEOPLASM OF THORACIC ESOPHAGUS: Primary | Chronic | ICD-10-CM

## 2022-07-21 LAB
ALBUMIN SERPL-MCNC: 3.5 G/DL (ref 3.5–5.2)
ALBUMIN/GLOB SERPL: 1.2 G/DL
ALP SERPL-CCNC: 93 U/L (ref 39–117)
ALT SERPL W P-5'-P-CCNC: 10 U/L (ref 1–33)
ANION GAP SERPL CALCULATED.3IONS-SCNC: 9 MMOL/L (ref 5–15)
AST SERPL-CCNC: 14 U/L (ref 1–32)
BASOPHILS # BLD AUTO: 0.03 10*3/MM3 (ref 0–0.2)
BASOPHILS NFR BLD AUTO: 0.7 % (ref 0–1.5)
BILIRUB SERPL-MCNC: 0.7 MG/DL (ref 0–1.2)
BUN SERPL-MCNC: 15 MG/DL (ref 8–23)
BUN/CREAT SERPL: 19 (ref 7–25)
CALCIUM SPEC-SCNC: 9.5 MG/DL (ref 8.6–10.5)
CHLORIDE SERPL-SCNC: 103 MMOL/L (ref 98–107)
CO2 SERPL-SCNC: 26 MMOL/L (ref 22–29)
CREAT SERPL-MCNC: 0.79 MG/DL (ref 0.57–1)
DEPRECATED RDW RBC AUTO: 44.7 FL (ref 37–54)
EGFRCR SERPLBLD CKD-EPI 2021: 83.6 ML/MIN/1.73
EOSINOPHIL # BLD AUTO: 0.08 10*3/MM3 (ref 0–0.4)
EOSINOPHIL NFR BLD AUTO: 1.8 % (ref 0.3–6.2)
ERYTHROCYTE [DISTWIDTH] IN BLOOD BY AUTOMATED COUNT: 12.6 % (ref 12.3–15.4)
FERRITIN SERPL-MCNC: 112.9 NG/ML (ref 13–150)
FOLATE SERPL-MCNC: 17.9 NG/ML (ref 4.78–24.2)
GLOBULIN UR ELPH-MCNC: 2.9 GM/DL
GLUCOSE SERPL-MCNC: 125 MG/DL (ref 65–99)
HCT VFR BLD AUTO: 40 % (ref 34–46.6)
HGB BLD-MCNC: 13.3 G/DL (ref 12–15.9)
HOLD SPECIMEN: NORMAL
IMM GRANULOCYTES # BLD AUTO: 0.01 10*3/MM3 (ref 0–0.05)
IMM GRANULOCYTES NFR BLD AUTO: 0.2 % (ref 0–0.5)
IRON 24H UR-MRATE: 96 MCG/DL (ref 37–145)
IRON SATN MFR SERPL: 25 % (ref 20–50)
LYMPHOCYTES # BLD AUTO: 1.12 10*3/MM3 (ref 0.7–3.1)
LYMPHOCYTES NFR BLD AUTO: 24.5 % (ref 19.6–45.3)
MCH RBC QN AUTO: 32 PG (ref 26.6–33)
MCHC RBC AUTO-ENTMCNC: 33.3 G/DL (ref 31.5–35.7)
MCV RBC AUTO: 96.4 FL (ref 79–97)
MONOCYTES # BLD AUTO: 0.53 10*3/MM3 (ref 0.1–0.9)
MONOCYTES NFR BLD AUTO: 11.6 % (ref 5–12)
NEUTROPHILS NFR BLD AUTO: 2.8 10*3/MM3 (ref 1.7–7)
NEUTROPHILS NFR BLD AUTO: 61.2 % (ref 42.7–76)
NRBC BLD AUTO-RTO: 0 /100 WBC (ref 0–0.2)
PLATELET # BLD AUTO: 212 10*3/MM3 (ref 140–450)
PMV BLD AUTO: 11.6 FL (ref 6–12)
POTASSIUM SERPL-SCNC: 4.1 MMOL/L (ref 3.5–5.2)
PROT SERPL-MCNC: 6.4 G/DL (ref 6–8.5)
RBC # BLD AUTO: 4.15 10*6/MM3 (ref 3.77–5.28)
SODIUM SERPL-SCNC: 138 MMOL/L (ref 136–145)
TIBC SERPL-MCNC: 378 MCG/DL (ref 298–536)
TRANSFERRIN SERPL-MCNC: 254 MG/DL (ref 200–360)
VIT B12 BLD-MCNC: 1462 PG/ML (ref 211–946)
WBC NRBC COR # BLD: 4.57 10*3/MM3 (ref 3.4–10.8)

## 2022-07-21 PROCEDURE — 99214 OFFICE O/P EST MOD 30 MIN: CPT | Performed by: INTERNAL MEDICINE

## 2022-07-21 PROCEDURE — 85025 COMPLETE CBC W/AUTO DIFF WBC: CPT

## 2022-07-21 PROCEDURE — 82746 ASSAY OF FOLIC ACID SERUM: CPT

## 2022-07-21 PROCEDURE — 84466 ASSAY OF TRANSFERRIN: CPT

## 2022-07-21 PROCEDURE — 1123F ACP DISCUSS/DSCN MKR DOCD: CPT | Performed by: INTERNAL MEDICINE

## 2022-07-21 PROCEDURE — 82728 ASSAY OF FERRITIN: CPT

## 2022-07-21 PROCEDURE — G0463 HOSPITAL OUTPT CLINIC VISIT: HCPCS | Performed by: INTERNAL MEDICINE

## 2022-07-21 PROCEDURE — 82607 VITAMIN B-12: CPT

## 2022-07-21 PROCEDURE — 83540 ASSAY OF IRON: CPT

## 2022-07-21 PROCEDURE — 1125F AMNT PAIN NOTED PAIN PRSNT: CPT | Performed by: INTERNAL MEDICINE

## 2022-07-21 PROCEDURE — 80053 COMPREHEN METABOLIC PANEL: CPT

## 2022-07-21 RX ORDER — FERROUS SULFATE 325(65) MG
1 TABLET ORAL
Qty: 30 TABLET | Refills: 3 | Status: SHIPPED | OUTPATIENT
Start: 2022-07-21

## 2022-07-21 RX ORDER — FOLIC ACID 1 MG/1
TABLET ORAL
Qty: 36 TABLET | Refills: 1 | Status: SHIPPED | OUTPATIENT
Start: 2022-07-21

## 2022-07-21 RX ORDER — LANOLIN ALCOHOL/MO/W.PET/CERES
1000 CREAM (GRAM) TOPICAL DAILY
COMMUNITY

## 2022-07-21 RX ORDER — DOCUSATE SODIUM 100 MG/1
100 CAPSULE, LIQUID FILLED ORAL 2 TIMES DAILY PRN
Qty: 60 CAPSULE | Refills: 2 | Status: SHIPPED | OUTPATIENT
Start: 2022-07-21

## 2022-07-21 NOTE — PROGRESS NOTES
DATE OF VISIT: 7/21/2022      REASON FOR VISIT: Esophageal cancer, iron deficiency      HISTORY OF PRESENT ILLNESS:   62-year-old female with medical problem consisting of moderate differentiated squamous cell cancer of medial third of esophagus diagnosed in September 2015 currently on surveillance, anxiety, hypertension, iron deficiency is here for follow-up appointment today.  Complains of back pain radiating down to left lower extremity that started about 2 and half months ago.  Denies any bleeding.  Denies any new lymph node enlargement.  Denies any worsening difficulty swallowing.  Denies any drenching night sweats or any unexpected major weight loss.              Past Medical History, Past Surgical History, Social History, Family History have been reviewed and are without significant changes except as mentioned.    Review of Systems   A comprehensive 14 point review of systems was performed and was negative except as mentioned in HPI.    Medications:  The current medication list was reviewed in the EMR    ALLERGIES:    Allergies   Allergen Reactions   • Penicillins Hives   • Strawberry C [Ascorbate] Hives       Objective      Vitals:    07/21/22 0802   BP: 166/94   Pulse: 58   Temp: 96.9 °F (36.1 °C)   TempSrc: Temporal   SpO2: 100%   Weight: 60.1 kg (132 lb 6.4 oz)   PainSc:   8   PainLoc: Leg  Comment: left butt cheek down her left leg.     Current Status 4/1/2021   ECOG score 0       Physical Exam  Pulmonary:      Breath sounds: Normal breath sounds.   Neurological:      Mental Status: She is alert and oriented to person, place, and time.           RECENT LABS:  Glucose   Date Value Ref Range Status   07/21/2022 125 (H) 65 - 99 mg/dL Final     Sodium   Date Value Ref Range Status   07/21/2022 138 136 - 145 mmol/L Final     Potassium   Date Value Ref Range Status   07/21/2022 4.1 3.5 - 5.2 mmol/L Final     CO2   Date Value Ref Range Status   07/21/2022 26.0 22.0 - 29.0 mmol/L Final     Chloride   Date Value  Ref Range Status   07/21/2022 103 98 - 107 mmol/L Final     Anion Gap   Date Value Ref Range Status   07/21/2022 9.0 5.0 - 15.0 mmol/L Final     Creatinine   Date Value Ref Range Status   07/21/2022 0.79 0.57 - 1.00 mg/dL Final     BUN   Date Value Ref Range Status   07/21/2022 15 8 - 23 mg/dL Final     BUN/Creatinine Ratio   Date Value Ref Range Status   07/21/2022 19.0 7.0 - 25.0 Final     Calcium   Date Value Ref Range Status   07/21/2022 9.5 8.6 - 10.5 mg/dL Final     Alkaline Phosphatase   Date Value Ref Range Status   07/21/2022 93 39 - 117 U/L Final     Total Protein   Date Value Ref Range Status   07/21/2022 6.4 6.0 - 8.5 g/dL Final     ALT (SGPT)   Date Value Ref Range Status   07/21/2022 10 1 - 33 U/L Final     AST (SGOT)   Date Value Ref Range Status   07/21/2022 14 1 - 32 U/L Final     Total Bilirubin   Date Value Ref Range Status   07/21/2022 0.7 0.0 - 1.2 mg/dL Final     Albumin   Date Value Ref Range Status   07/21/2022 3.50 3.50 - 5.20 g/dL Final     Globulin   Date Value Ref Range Status   07/21/2022 2.9 gm/dL Final     Lab Results   Component Value Date    WBC 4.57 07/21/2022    HGB 13.3 07/21/2022    HCT 40.0 07/21/2022    MCV 96.4 07/21/2022     07/21/2022     Lab Results   Component Value Date    NEUTROABS 2.80 07/21/2022    IRON 96 07/21/2022    IRON 81 03/24/2022    IRON 109 10/28/2021    TIBC 378 07/21/2022    TIBC 393 03/24/2022    TIBC 393 10/28/2021    LABIRON 25 07/21/2022    LABIRON 21 03/24/2022    LABIRON 28 10/28/2021    FERRITIN 112.90 07/21/2022    FERRITIN 119.40 03/24/2022    FERRITIN 74.35 10/28/2021    IGPWCSWW53 1,462 (H) 07/21/2022    CMUBJEYU31 1,719 (H) 03/24/2022    IPWGFHRO20 1,820 (H) 10/28/2021    FOLATE 17.90 07/21/2022    FOLATE >20.00 03/24/2022    FOLATE 15.70 10/28/2021     No results found for: , LABCA2, AFPTM, HCGQUANT, , CHROMGRNA, [id number], CEA, REFLABREPO      PATHOLOGY:  * Cannot find OR log *         RADIOLOGY DATA :  No radiology results  for the last 7 days        Assessment & Plan     1.  Squamous cell cancer of mid esophagus  - Patient was diagnosed in September 2015  Had concurrent chemoradiation with carboplatin and Taxol that completed in January 2016  - Had an esophagectomy done in March 1, 2016 that showed complete pathologic response.  Has been on surveillance since then  - EGD in February 2021 was negative for recurrence.  - Since patient is more than 5 years out from her diagnosis no need for any routine surveillance CT scan which was discussed with patient today  - We will have patient return to clinic in 4 months with repeat CBC, CMP, iron studies, ferritin, B12 and folate to be done on that day.    2.  Iron deficiency:  - Patient was found to have iron deficiency in December 2020  - Currently on ferrous sulfate p.o. daily along with folic acid 3 times a week.  - Anemia work-up done today shows hemoglobin is 13.3.  We will continue with same supplements for now    3.  Lower back pain radiating down to her left lower extremity  - Secondary to sciatica.  Recommend following up with primary medical provider for further imaging and treatment of possible sciatica.    4.  Health maintenance: Patient does not smoke    5. Advance Care Planning: For now patient remains full code and is able to make decisions.  Patient has health care surrogate mentioned on chart.    6.  Prescriptions: Prescription for ferrous sulfate, folic acid and Colace has been sent to her pharmacy today             PHQ-9 Total Score: 0   -Patient is not homicidal or suicidal.  No acute intervention required.    Maddison Sheridan reports a pain score of 8.  Given her pain assessment as noted, treatment options were discussed and the following options were decided upon as a follow-up plan to address the patient's pain: referral to Primary Care for assistance in pain treatment guidance.         Camilo Coleman MD  7/21/2022  17:54 CDT        Part of this note may be an  electronic transcription/translation of spoken language to printed text using the Dragon Dictation System.          CC:

## 2022-07-22 ENCOUNTER — TELEPHONE (OUTPATIENT)
Dept: ONCOLOGY | Facility: HOSPITAL | Age: 62
End: 2022-07-22

## 2022-07-22 NOTE — TELEPHONE ENCOUNTER
----- Message from Camilo Coleman MD sent at 7/21/2022  5:55 PM CDT -----  Regarding: labs  Please let patient know, she needs to continue taking ferrous sulfate 1 tablet p.o. daily with folic acid 3 times a week.  Prescription for ferrous sulfate, Colace and folic acid has been sent to her pharmacy today.  Thank you

## 2022-10-20 RX ORDER — FAMOTIDINE 20 MG/1
20 TABLET, FILM COATED ORAL DAILY
Qty: 30 TABLET | Refills: 3 | OUTPATIENT
Start: 2022-10-20

## 2022-11-30 RX ORDER — FAMOTIDINE 20 MG/1
20 TABLET, FILM COATED ORAL DAILY
Qty: 30 TABLET | Refills: 5 | Status: SHIPPED | OUTPATIENT
Start: 2022-11-30 | End: 2022-12-22 | Stop reason: SDUPTHER

## 2022-12-08 ENCOUNTER — LAB (OUTPATIENT)
Dept: ONCOLOGY | Facility: HOSPITAL | Age: 62
End: 2022-12-08

## 2022-12-08 ENCOUNTER — OFFICE VISIT (OUTPATIENT)
Dept: ONCOLOGY | Facility: CLINIC | Age: 62
End: 2022-12-08

## 2022-12-08 VITALS
SYSTOLIC BLOOD PRESSURE: 135 MMHG | BODY MASS INDEX: 23.1 KG/M2 | TEMPERATURE: 97.9 F | DIASTOLIC BLOOD PRESSURE: 88 MMHG | WEIGHT: 130.4 LBS | HEART RATE: 63 BPM | OXYGEN SATURATION: 96 %

## 2022-12-08 DIAGNOSIS — C15.4 MALIGNANT NEOPLASM OF THORACIC ESOPHAGUS: ICD-10-CM

## 2022-12-08 DIAGNOSIS — E61.1 IRON DEFICIENCY: Chronic | ICD-10-CM

## 2022-12-08 DIAGNOSIS — E61.1 IRON DEFICIENCY: ICD-10-CM

## 2022-12-08 DIAGNOSIS — C15.4 MALIGNANT NEOPLASM OF THORACIC ESOPHAGUS: Primary | Chronic | ICD-10-CM

## 2022-12-08 LAB
ALBUMIN SERPL-MCNC: 4.1 G/DL (ref 3.5–5.2)
ALBUMIN/GLOB SERPL: 1.3 G/DL
ALP SERPL-CCNC: 111 U/L (ref 39–117)
ALT SERPL W P-5'-P-CCNC: 10 U/L (ref 1–33)
ANION GAP SERPL CALCULATED.3IONS-SCNC: 7 MMOL/L (ref 5–15)
AST SERPL-CCNC: 13 U/L (ref 1–32)
BASOPHILS # BLD AUTO: 0.04 10*3/MM3 (ref 0–0.2)
BASOPHILS NFR BLD AUTO: 0.7 % (ref 0–1.5)
BILIRUB SERPL-MCNC: 0.5 MG/DL (ref 0–1.2)
BUN SERPL-MCNC: 15 MG/DL (ref 8–23)
BUN/CREAT SERPL: 17.4 (ref 7–25)
CALCIUM SPEC-SCNC: 9 MG/DL (ref 8.6–10.5)
CHLORIDE SERPL-SCNC: 103 MMOL/L (ref 98–107)
CO2 SERPL-SCNC: 30 MMOL/L (ref 22–29)
CREAT SERPL-MCNC: 0.86 MG/DL (ref 0.57–1)
DEPRECATED RDW RBC AUTO: 43.6 FL (ref 37–54)
EGFRCR SERPLBLD CKD-EPI 2021: 75.5 ML/MIN/1.73
EOSINOPHIL # BLD AUTO: 0.13 10*3/MM3 (ref 0–0.4)
EOSINOPHIL NFR BLD AUTO: 2.2 % (ref 0.3–6.2)
ERYTHROCYTE [DISTWIDTH] IN BLOOD BY AUTOMATED COUNT: 12.7 % (ref 12.3–15.4)
FERRITIN SERPL-MCNC: 149.5 NG/ML (ref 13–150)
FOLATE SERPL-MCNC: 17.8 NG/ML (ref 4.78–24.2)
GLOBULIN UR ELPH-MCNC: 3.1 GM/DL
GLUCOSE SERPL-MCNC: 78 MG/DL (ref 65–99)
HCT VFR BLD AUTO: 44.1 % (ref 34–46.6)
HGB BLD-MCNC: 14.6 G/DL (ref 12–15.9)
IMM GRANULOCYTES # BLD AUTO: 0.01 10*3/MM3 (ref 0–0.05)
IMM GRANULOCYTES NFR BLD AUTO: 0.2 % (ref 0–0.5)
IRON 24H UR-MRATE: 87 MCG/DL (ref 37–145)
IRON SATN MFR SERPL: 23 % (ref 20–50)
LYMPHOCYTES # BLD AUTO: 1.35 10*3/MM3 (ref 0.7–3.1)
LYMPHOCYTES NFR BLD AUTO: 23 % (ref 19.6–45.3)
MCH RBC QN AUTO: 31.3 PG (ref 26.6–33)
MCHC RBC AUTO-ENTMCNC: 33.1 G/DL (ref 31.5–35.7)
MCV RBC AUTO: 94.6 FL (ref 79–97)
MONOCYTES # BLD AUTO: 0.62 10*3/MM3 (ref 0.1–0.9)
MONOCYTES NFR BLD AUTO: 10.6 % (ref 5–12)
NEUTROPHILS NFR BLD AUTO: 3.72 10*3/MM3 (ref 1.7–7)
NEUTROPHILS NFR BLD AUTO: 63.3 % (ref 42.7–76)
NRBC BLD AUTO-RTO: 0 /100 WBC (ref 0–0.2)
PLATELET # BLD AUTO: 231 10*3/MM3 (ref 140–450)
PMV BLD AUTO: 11.8 FL (ref 6–12)
POTASSIUM SERPL-SCNC: 3.9 MMOL/L (ref 3.5–5.2)
PROT SERPL-MCNC: 7.2 G/DL (ref 6–8.5)
RBC # BLD AUTO: 4.66 10*6/MM3 (ref 3.77–5.28)
SODIUM SERPL-SCNC: 140 MMOL/L (ref 136–145)
TIBC SERPL-MCNC: 375 MCG/DL (ref 298–536)
TRANSFERRIN SERPL-MCNC: 252 MG/DL (ref 200–360)
VIT B12 BLD-MCNC: 1474 PG/ML (ref 211–946)
WBC NRBC COR # BLD: 5.87 10*3/MM3 (ref 3.4–10.8)

## 2022-12-08 PROCEDURE — 1123F ACP DISCUSS/DSCN MKR DOCD: CPT | Performed by: INTERNAL MEDICINE

## 2022-12-08 PROCEDURE — G0463 HOSPITAL OUTPT CLINIC VISIT: HCPCS | Performed by: INTERNAL MEDICINE

## 2022-12-08 PROCEDURE — 85025 COMPLETE CBC W/AUTO DIFF WBC: CPT | Performed by: INTERNAL MEDICINE

## 2022-12-08 PROCEDURE — 82746 ASSAY OF FOLIC ACID SERUM: CPT | Performed by: INTERNAL MEDICINE

## 2022-12-08 PROCEDURE — 80053 COMPREHEN METABOLIC PANEL: CPT | Performed by: INTERNAL MEDICINE

## 2022-12-08 PROCEDURE — 1126F AMNT PAIN NOTED NONE PRSNT: CPT | Performed by: INTERNAL MEDICINE

## 2022-12-08 PROCEDURE — 99214 OFFICE O/P EST MOD 30 MIN: CPT | Performed by: INTERNAL MEDICINE

## 2022-12-08 PROCEDURE — 82607 VITAMIN B-12: CPT | Performed by: INTERNAL MEDICINE

## 2022-12-08 PROCEDURE — 83540 ASSAY OF IRON: CPT | Performed by: INTERNAL MEDICINE

## 2022-12-08 PROCEDURE — 82728 ASSAY OF FERRITIN: CPT | Performed by: INTERNAL MEDICINE

## 2022-12-08 PROCEDURE — 84466 ASSAY OF TRANSFERRIN: CPT | Performed by: INTERNAL MEDICINE

## 2022-12-08 NOTE — PROGRESS NOTES
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  Denies any bleeding.  Denies any new lymph node enlargement.  Denies any new onset of dysphagia.  Denies any drenching night sweats or any unexpected major weight loss.          Past Medical History, Past Surgical History, Social History, Family History have been reviewed and are without significant changes except as mentioned.    Review of Systems   A comprehensive 14 point review of systems was performed and was negative except as mentioned in HPI.    Medications:  The current medication list was reviewed in the EMR    ALLERGIES:    Allergies   Allergen Reactions   • Penicillins Hives   • Strawberry C [Ascorbate] Hives       Objective      Vitals:    12/08/22 0802   BP: 135/88   Pulse: 63   Temp: 97.9 °F (36.6 °C)   TempSrc: Oral   SpO2: 96%   Weight: 59.1 kg (130 lb 6.4 oz)   PainSc: 0-No pain     Current Status 4/1/2021   ECOG score 0       Physical Exam  Pulmonary:      Breath sounds: Normal breath sounds.   Neurological:      Mental Status: She is alert and oriented to person, place, and time.           RECENT LABS:  Glucose   Date Value Ref Range Status   12/08/2022 78 65 - 99 mg/dL Final     Sodium   Date Value Ref Range Status   12/08/2022 140 136 - 145 mmol/L Final     Potassium   Date Value Ref Range Status   12/08/2022 3.9 3.5 - 5.2 mmol/L Final     CO2   Date Value Ref Range Status   12/08/2022 30.0 (H) 22.0 - 29.0 mmol/L Final     Chloride   Date Value Ref Range Status   12/08/2022 103 98 - 107 mmol/L Final     Anion Gap   Date Value Ref Range Status   12/08/2022 7.0 5.0 - 15.0 mmol/L Final     Creatinine   Date Value Ref Range  Status   12/08/2022 0.86 0.57 - 1.00 mg/dL Final     BUN   Date Value Ref Range Status   12/08/2022 15 8 - 23 mg/dL Final     BUN/Creatinine Ratio   Date Value Ref Range Status   12/08/2022 17.4 7.0 - 25.0 Final     Calcium   Date Value Ref Range Status   12/08/2022 9.0 8.6 - 10.5 mg/dL Final     Alkaline Phosphatase   Date Value Ref Range Status   12/08/2022 111 39 - 117 U/L Final     Total Protein   Date Value Ref Range Status   12/08/2022 7.2 6.0 - 8.5 g/dL Final     ALT (SGPT)   Date Value Ref Range Status   12/08/2022 10 1 - 33 U/L Final     AST (SGOT)   Date Value Ref Range Status   12/08/2022 13 1 - 32 U/L Final     Total Bilirubin   Date Value Ref Range Status   12/08/2022 0.5 0.0 - 1.2 mg/dL Final     Albumin   Date Value Ref Range Status   12/08/2022 4.10 3.50 - 5.20 g/dL Final     Globulin   Date Value Ref Range Status   12/08/2022 3.1 gm/dL Final     Lab Results   Component Value Date    WBC 5.87 12/08/2022    HGB 14.6 12/08/2022    HCT 44.1 12/08/2022    MCV 94.6 12/08/2022     12/08/2022     Lab Results   Component Value Date    NEUTROABS 3.72 12/08/2022    IRON 87 12/08/2022    IRON 96 07/21/2022    IRON 81 03/24/2022    TIBC 375 12/08/2022    TIBC 378 07/21/2022    TIBC 393 03/24/2022    LABIRON 23 12/08/2022    LABIRON 25 07/21/2022    LABIRON 21 03/24/2022    FERRITIN 149.50 12/08/2022    FERRITIN 112.90 07/21/2022    FERRITIN 119.40 03/24/2022    MFJKHQZW28 1,474 (H) 12/08/2022    [id number] 1,462 (H) 07/21/2022    CXTXDDGY25 1,719 (H) 03/24/2022    FOLATE 17.80 12/08/2022    FOLATE 17.90 07/21/2022    FOLATE >20.00 03/24/2022     No results found for: , LABCA2, AFPTM, HCGQUANT, , CHROMGRNA, 0MKVC20NKO, CEA, REFLABREPO      PATHOLOGY:  * Cannot find OR log *         RADIOLOGY DATA :  No radiology results for the last 7 days        Assessment & Plan     1.  Squamous cell cancer of mid esophagus  - Patient was diagnosed in September 2015  - Received concurrent chemoradiation with  weekly carboplatin and Taxol that completed in January 2016  - Had esophagectomy done in March 1, 2016 that showed complete pathologic response.  Has been on surveillance since then  - EGD in February 2021 was negative for recurrence  - Since patient is more than 5 years out from diagnosis, no need for any routine CT scan which has been discussed with patient today  - We will have patient return to clinic in 4 months with repeat CBC, CMP, iron studies, ferritin, B12 and folate to be done on that day    2.  Iron deficiency:  - Patient was found to have iron deficiency in December 2020  - Currently on ferrous sulfate p.o. daily with folic acid 3 times a week  - Anemia work-up done today shows hemoglobin is 14.6.  Iron studies are showing improvement.    3.  Health maintenance: Patient does not smoke    4. Advance Care Planning: For now patient remains full code and is able to make decisions.  Patient has health care surrogate mentioned on chart.                 PHQ-9 Total Score: 0   -Patient is not homicidal or suicidal.  No acute intervention required.    Maddison Sheridan reports a pain score of 0.  Given her pain assessment as noted, treatment options were discussed and the following options were decided upon as a follow-up plan to address the patient's pain: continuation of current treatment plan for pain.         Camilo Coleman MD  12/9/2022  06:50 CST        Part of this note may be an electronic transcription/translation of spoken language to printed text using the Dragon Dictation System.          CC:

## 2022-12-09 ENCOUNTER — TELEPHONE (OUTPATIENT)
Dept: ONCOLOGY | Facility: HOSPITAL | Age: 62
End: 2022-12-09

## 2022-12-09 ENCOUNTER — TELEPHONE (OUTPATIENT)
Dept: ONCOLOGY | Facility: CLINIC | Age: 62
End: 2022-12-09

## 2022-12-09 NOTE — TELEPHONE ENCOUNTER
----- Message from Camilo Coleman MD sent at 12/9/2022  6:50 AM CST -----  Regarding: Lab results  Please let patient know, she needs to continue taking ferrous sulfate.  Recommend continue with folic acid 3 times a week.  Thank you

## 2022-12-22 ENCOUNTER — OFFICE VISIT (OUTPATIENT)
Dept: GASTROENTEROLOGY | Facility: CLINIC | Age: 62
End: 2022-12-22

## 2022-12-22 VITALS
HEIGHT: 63 IN | DIASTOLIC BLOOD PRESSURE: 78 MMHG | BODY MASS INDEX: 23 KG/M2 | SYSTOLIC BLOOD PRESSURE: 130 MMHG | WEIGHT: 129.8 LBS

## 2022-12-22 DIAGNOSIS — R13.19 OTHER DYSPHAGIA: Primary | ICD-10-CM

## 2022-12-22 PROCEDURE — 99214 OFFICE O/P EST MOD 30 MIN: CPT | Performed by: INTERNAL MEDICINE

## 2022-12-22 RX ORDER — FAMOTIDINE 20 MG/1
20 TABLET, FILM COATED ORAL 2 TIMES DAILY
Qty: 180 TABLET | Refills: 5 | Status: SHIPPED | OUTPATIENT
Start: 2022-12-22

## 2022-12-22 RX ORDER — FAMOTIDINE 20 MG/1
20 TABLET, FILM COATED ORAL 2 TIMES DAILY
Qty: 60 TABLET | Refills: 5 | Status: SHIPPED | OUTPATIENT
Start: 2022-12-22 | End: 2022-12-22 | Stop reason: SDUPTHER

## 2022-12-22 RX ORDER — DEXLANSOPRAZOLE 60 MG/1
60 CAPSULE, DELAYED RELEASE ORAL DAILY
Qty: 90 CAPSULE | Refills: 5 | Status: SHIPPED | OUTPATIENT
Start: 2022-12-22

## 2022-12-22 RX ORDER — DEXTROSE AND SODIUM CHLORIDE 5; .45 G/100ML; G/100ML
30 INJECTION, SOLUTION INTRAVENOUS CONTINUOUS PRN
Status: CANCELLED | OUTPATIENT
Start: 2023-01-04

## 2022-12-22 NOTE — PROGRESS NOTES
[facility]      Chief Complaint:   Chief Complaint   Patient presents with   • 1 year follow up       Subjective     HPI:   Patient with history of malignant neoplasm of the esophagus.  Patient states that she is having some difficulty swallowing at this time.  Patient has had a dilated in the past last 2 years ago.  Patient is taking proton pump inhibitor and H2 blocker.    Plan; we will schedule patient for EGD with dilatation.  We will renew patient's P PI and H2 blocker.  We will increase H2 blocker twice a day    Past Medical History:   Past Medical History:   Diagnosis Date   • Abnormal weight loss    • Anxiety    • Atrophic vaginitis    • Depression    • Dysphagia     pharyngoesophageal phase      • Encounter for surgical aftercare following surgery of digestive system     Walter Michael (thoracic esophagectomy, lymphadenectomly, Bronchoscopy, Placement on Q. 3/1/16      • History of esophagogastroduodenoscopy     Normal hypopharynx.A tumor was found in the middle third of the esophagus.Mul.biopsies taken.Normal GE junction.Mild nonerosive gastritis in antrum.Biopsies taken.Normal pylorus and duodenum. 09/24/2015       • History of hysteroscopy     Hysteroscopy, dilation and curettage, and repair of vaginal laceration. 08/03/2013       • History of substance abuse (HCC)    • Hypertension    • Lumbosacral strain    • Myopia    • Presbyopia    • Primary malignant neoplasm of thoracic part of esophagus (HCC)     Squamous cell   • Tobacco dependence     1 ppd       Past Surgical History:  Past Surgical History:   Procedure Laterality Date   • ABDOMINAL SURGERY  03/01/2016    Abdominal portion of an Walter Michael esophagogastrectomy with jejunostomy tube placement. Esophageal carcinoma.   • COLONOSCOPY N/A 12/18/2017    Procedure: COLONOSCOPY;  Surgeon: Babar Connell MD;  Location: [facility] ENDOSCOPY;  Service:    • COLONOSCOPY N/A 8/17/2021    Procedure: COLONOSCOPY;  Surgeon:  Babar Connell MD;  Location: [facility] ENDOSCOPY;  Service: Gastroenterology;  Laterality: N/A;   • ENDOSCOPY N/A 2/3/2017    Procedure: ESOPHAGOGASTRODUODENOSCOPY;  Surgeon: Babar Connell MD;  Location: [facility] ENDOSCOPY;  Service:    • ENDOSCOPY N/A 5/22/2017    Procedure: ESOPHAGOGASTRODUODENOSCOPY;  Surgeon: Babar Connell MD;  Location: [facility] ENDOSCOPY;  Service:    • ENDOSCOPY N/A 9/22/2017    Procedure: ESOPHAGOGASTRODUODENOSCOPY;  Surgeon: Babar Connell MD;  Location: [facility] ENDOSCOPY;  Service:    • ENDOSCOPY N/A 12/18/2017    Procedure: ESOPHAGOGASTRODUODENOSCOPY;  Surgeon: Babar Connell MD;  Location: [facility] ENDOSCOPY;  Service:    • ENDOSCOPY N/A 6/14/2018    Procedure: ESOPHAGOGASTRODUODENOSCOPY possible dilation;  Surgeon: Babar Connell MD;  Location: [facility] ENDOSCOPY;  Service: Gastroenterology   • ENDOSCOPY N/A 12/14/2018    Procedure: ESOPHAGOGASTRODUODENOSCOPY;  Surgeon: Babar Connell MD;  Location: [facility] ENDOSCOPY;  Service: Gastroenterology   • ENDOSCOPY N/A 12/16/2019    Procedure: ESOPHAGOGASTRODUODENOSCOPY;  Surgeon: Babar Connell MD;  Location: [facility] ENDOSCOPY;  Service: Gastroenterology   • ENDOSCOPY N/A 6/3/2020    Procedure: ESOPHAGOGASTRODUODENOSCOPY;  Surgeon: Babar Connell MD;  Location: [facility] ENDOSCOPY;  Service: Gastroenterology;  Laterality: N/A;   • ENDOSCOPY N/A 2/9/2021    Procedure: ESOPHAGOGASTRODUODENOSCOPY;  Surgeon: Babar Connell MD;  Location: [facility] ENDOSCOPY;  Service: Gastroenterology;  Laterality: N/A;   • ESOPHAGUS SURGERY      Thoracic esophagectomy (Walter Michael type).Thoracic lymphadenectomy.Fiberoptic bronchoscopy.Placement of On Q pain pump.Jejunostomy tube placement.Pyloromyotomy. 03/01/2016      • LUMBAR DISC SURGERY     • UPPER GASTROINTESTINAL ENDOSCOPY  02/03/2017   • UPPER GASTROINTESTINAL ENDOSCOPY  05/22/2017   • UPPER GASTROINTESTINAL ENDOSCOPY  09/22/2017   • UPPER GASTROINTESTINAL ENDOSCOPY  12/18/2017   • UPPER GASTROINTESTINAL  ENDOSCOPY  06/14/2018   • UPPER GASTROINTESTINAL ENDOSCOPY  12/14/2018   • UPPER GASTROINTESTINAL ENDOSCOPY  02/09/2021       Family History:  Family History   Problem Relation Age of Onset   • Ovarian cancer Mother    • Cancer Mother    • Osteoporosis Other    • Heart disease Other    • Diabetes Other    • Cancer Other         Colorectal-Parent   • Asthma Other    • Asthma Sister    • Diabetes Sister    • Hypertension Sister    • Alcohol abuse Brother    • Cancer Maternal Uncle        Social History:   reports that she quit smoking about 7 years ago. Her smoking use included cigarettes. She smoked an average of .5 packs per day. She has never used smokeless tobacco. She reports current alcohol use. She reports that she does not use drugs.    Medications:   Prior to Admission medications    Medication Sig Start Date End Date Taking? Authorizing Provider   albuterol (PROVENTIL HFA;VENTOLIN HFA) 108 (90 BASE) MCG/ACT inhaler Inhale 2 puffs Every 4 (Four) Hours As Needed for wheezing. 2/17/17  Yes Tiffany Jimenez MD   dexlansoprazole (Dexilant) 60 MG capsule Take 1 capsule by mouth Daily. 12/22/22  Yes Babar Connell MD   famotidine (PEPCID) 20 MG tablet Take 1 tablet by mouth 2 (Two) Times a Day. 12/22/22  Yes Babar Connell MD   ferrous sulfate (FeroSul) 325 (65 FE) MG tablet Take 1 tablet by mouth Daily With Breakfast. 7/21/22  Yes Camilo Coleman MD   folic acid (FOLVITE) 1 MG tablet Take 1 tablet by mouth on Monday, Wednesday and Friday 7/21/22  Yes Camilo Coleman MD   vitamin B-12 (CYANOCOBALAMIN) 1000 MCG tablet Take 1,000 mcg by mouth Daily.   Yes Zoey Cruz MD   dexlansoprazole (Dexilant) 60 MG capsule Take 1 capsule by mouth Daily. 5/11/22 12/22/22 Yes Babar Connell MD   famotidine (PEPCID) 20 MG tablet Take 1 tablet by mouth Daily. 11/30/22 12/22/22 Yes Babar Connell MD   famotidine (PEPCID) 20 MG tablet Take 1 tablet by mouth 2 (Two) Times a Day. 12/22/22 12/22/22 Yes Babar Connell  "MD   docusate sodium (COLACE) 100 MG capsule Take 1 capsule by mouth 2 (Two) Times a Day As Needed for Constipation. 7/21/22   Camilo Coleman MD       Allergies:  Penicillins and Strawberry c [ascorbate]    ROS:    Review of Systems   Constitutional: Negative for activity change, appetite change, chills, diaphoresis, fatigue, fever and unexpected weight change.   HENT: Negative for sore throat and trouble swallowing.    Respiratory: Negative for shortness of breath.    Gastrointestinal: Negative for abdominal distention, abdominal pain, anal bleeding, blood in stool, constipation, diarrhea, nausea, rectal pain and vomiting.   Endocrine: Negative for polydipsia, polyphagia and polyuria.   Genitourinary: Negative for difficulty urinating.   Musculoskeletal: Negative for arthralgias.   Skin: Negative for pallor.   Allergic/Immunologic: Negative for food allergies.   Neurological: Negative for weakness and light-headedness.   Psychiatric/Behavioral: Negative for behavioral problems.     Objective     Blood pressure 130/78, height 160 cm (63\"), weight 58.9 kg (129 lb 12.8 oz), last menstrual period 03/17/1989, not currently breastfeeding.    Physical Exam  Constitutional:       General: She is not in acute distress.     Appearance: She is well-developed. She is not diaphoretic.   HENT:      Head: Normocephalic and atraumatic.   Cardiovascular:      Rate and Rhythm: Normal rate and regular rhythm.      Heart sounds: Normal heart sounds. No murmur heard.    No friction rub. No gallop.   Pulmonary:      Effort: No respiratory distress.      Breath sounds: Normal breath sounds. No wheezing or rales.   Chest:      Chest wall: No tenderness.   Abdominal:      General: Bowel sounds are normal. There is no distension.      Palpations: Abdomen is soft. There is no mass.      Tenderness: There is no abdominal tenderness. There is no guarding or rebound.      Hernia: No hernia is present.   Musculoskeletal:         General: Normal " range of motion.   Skin:     General: Skin is warm and dry.      Coloration: Skin is not pale.      Findings: No erythema or rash.   Neurological:      Mental Status: She is alert and oriented to person, place, and time.   Psychiatric:         Behavior: Behavior normal.         Thought Content: Thought content normal.         Judgment: Judgment normal.          Assessment & Plan   Diagnoses and all orders for this visit:    1. Other dysphagia (Primary)  -     Case Request; Standing  -     dextrose 5 % and sodium chloride 0.45 % infusion  -     Case Request    Other orders  -     Follow Anesthesia Guidelines / Protocol; Future  -     Obtain Informed Consent; Future  -     Implement Anesthesia Orders Day of Procedure; Standing  -     Obtain Informed Consent; Standing  -     POC Glucose Once; Standing  -     Insert Peripheral IV; Standing  -     Discontinue: famotidine (PEPCID) 20 MG tablet; Take 1 tablet by mouth 2 (Two) Times a Day.  Dispense: 60 tablet; Refill: 5  -     dexlansoprazole (Dexilant) 60 MG capsule; Take 1 capsule by mouth Daily.  Dispense: 90 capsule; Refill: 5  -     famotidine (PEPCID) 20 MG tablet; Take 1 tablet by mouth 2 (Two) Times a Day.  Dispense: 180 tablet; Refill: 5        ESOPHAGOGASTRODUODENOSCOPY (N/A)     Diagnosis Plan   1. Other dysphagia  Case Request    dextrose 5 % and sodium chloride 0.45 % infusion    Case Request          Anticipated Surgical Procedure:  Orders Placed This Encounter   Procedures   • Obtain Informed Consent     Standing Status:   Future     Order Specific Question:   Informed Consent Given For     Answer:   ESOPHAGOGASTRODUODENOSCOPY       The risks, benefits, and alternatives of this procedure have been discussed with the patient or the responsible party- the patient understands and agrees to proceed.

## 2023-02-01 ENCOUNTER — HOSPITAL ENCOUNTER (OUTPATIENT)
Facility: HOSPITAL | Age: 63
Setting detail: HOSPITAL OUTPATIENT SURGERY
Discharge: HOME | End: 2023-02-01
Attending: INTERNAL MEDICINE | Admitting: INTERNAL MEDICINE
Payer: MEDICARE

## 2023-02-01 ENCOUNTER — ANESTHESIA EVENT (OUTPATIENT)
Dept: GASTROENTEROLOGY | Facility: HOSPITAL | Age: 63
End: 2023-02-01
Payer: MEDICARE

## 2023-02-01 ENCOUNTER — ANESTHESIA (OUTPATIENT)
Dept: GASTROENTEROLOGY | Facility: HOSPITAL | Age: 63
End: 2023-02-01
Payer: MEDICARE

## 2023-02-01 VITALS
TEMPERATURE: 97.4 F | OXYGEN SATURATION: 99 % | RESPIRATION RATE: 18 BRPM | SYSTOLIC BLOOD PRESSURE: 115 MMHG | BODY MASS INDEX: 23.35 KG/M2 | DIASTOLIC BLOOD PRESSURE: 68 MMHG | HEIGHT: 63 IN | HEART RATE: 73 BPM | WEIGHT: 131.8 LBS

## 2023-02-01 DIAGNOSIS — R13.19 OTHER DYSPHAGIA: ICD-10-CM

## 2023-02-01 PROCEDURE — C1769 GUIDE WIRE: HCPCS | Performed by: INTERNAL MEDICINE

## 2023-02-01 PROCEDURE — 25010000002 PROPOFOL 10 MG/ML EMULSION: Performed by: NURSE ANESTHETIST, CERTIFIED REGISTERED

## 2023-02-01 PROCEDURE — 43248 EGD GUIDE WIRE INSERTION: CPT | Performed by: INTERNAL MEDICINE

## 2023-02-01 RX ORDER — PROMETHAZINE HYDROCHLORIDE 25 MG/1
25 TABLET ORAL ONCE AS NEEDED
Status: DISCONTINUED | OUTPATIENT
Start: 2023-02-01 | End: 2023-02-01 | Stop reason: HOSPADM

## 2023-02-01 RX ORDER — ONDANSETRON 2 MG/ML
4 INJECTION INTRAMUSCULAR; INTRAVENOUS ONCE AS NEEDED
Status: DISCONTINUED | OUTPATIENT
Start: 2023-02-01 | End: 2023-02-01 | Stop reason: HOSPADM

## 2023-02-01 RX ORDER — DEXTROSE AND SODIUM CHLORIDE 5; .45 G/100ML; G/100ML
30 INJECTION, SOLUTION INTRAVENOUS CONTINUOUS PRN
Status: DISCONTINUED | OUTPATIENT
Start: 2023-02-01 | End: 2023-02-01 | Stop reason: HOSPADM

## 2023-02-01 RX ORDER — PROMETHAZINE HYDROCHLORIDE 25 MG/1
25 SUPPOSITORY RECTAL ONCE AS NEEDED
Status: DISCONTINUED | OUTPATIENT
Start: 2023-02-01 | End: 2023-02-01 | Stop reason: HOSPADM

## 2023-02-01 RX ORDER — PROPOFOL 10 MG/ML
VIAL (ML) INTRAVENOUS AS NEEDED
Status: DISCONTINUED | OUTPATIENT
Start: 2023-02-01 | End: 2023-02-01 | Stop reason: SURG

## 2023-02-01 RX ORDER — MEPERIDINE HYDROCHLORIDE 50 MG/ML
12.5 INJECTION INTRAMUSCULAR; INTRAVENOUS; SUBCUTANEOUS
Status: DISCONTINUED | OUTPATIENT
Start: 2023-02-01 | End: 2023-02-01 | Stop reason: HOSPADM

## 2023-02-01 RX ORDER — DEXTROSE AND SODIUM CHLORIDE 5; .45 G/100ML; G/100ML
INJECTION, SOLUTION INTRAVENOUS CONTINUOUS PRN
Status: DISCONTINUED | OUTPATIENT
Start: 2023-02-01 | End: 2023-02-01 | Stop reason: SURG

## 2023-02-01 RX ADMIN — PROPOFOL 30 MG: 10 INJECTION, EMULSION INTRAVENOUS at 09:59

## 2023-02-01 RX ADMIN — PROPOFOL 70 MG: 10 INJECTION, EMULSION INTRAVENOUS at 09:57

## 2023-02-01 RX ADMIN — PROPOFOL 50 MG: 10 INJECTION, EMULSION INTRAVENOUS at 10:02

## 2023-02-01 RX ADMIN — DEXTROSE AND SODIUM CHLORIDE 30 ML/HR: 5; 450 INJECTION, SOLUTION INTRAVENOUS at 09:01

## 2023-02-01 RX ADMIN — DEXTROSE AND SODIUM CHLORIDE: 5; 450 INJECTION, SOLUTION INTRAVENOUS at 09:57

## 2023-02-01 NOTE — H&P
[facility]      Chief Complaint:   No chief complaint on file.      Subjective     HPI: I agree with the current note with no changes in the history.    Patient with history of malignant neoplasm of the esophagus.  Patient states that she is having some difficulty swallowing at this time.  Patient has had a dilated in the past last 2 years ago.  Patient is taking proton pump inhibitor and H2 blocker.    Plan; we will schedule patient for EGD with dilatation.  We will renew patient's P PI and H2 blocker.  We will increase H2 blocker twice a day    Past Medical History:   Past Medical History:   Diagnosis Date   • Abnormal weight loss    • Anxiety    • Atrophic vaginitis    • Depression    • Dysphagia     pharyngoesophageal phase      • Encounter for surgical aftercare following surgery of digestive system     Walter Michael (thoracic esophagectomy, lymphadenectomly, Bronchoscopy, Placement on Q. 3/1/16      • History of esophagogastroduodenoscopy     Normal hypopharynx.A tumor was found in the middle third of the esophagus.Mul.biopsies taken.Normal GE junction.Mild nonerosive gastritis in antrum.Biopsies taken.Normal pylorus and duodenum. 09/24/2015       • History of hysteroscopy     Hysteroscopy, dilation and curettage, and repair of vaginal laceration. 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 Esophageal carcinoma.   • COLONOSCOPY N/A 12/18/2017    Procedure: COLONOSCOPY;  Surgeon: Babar Connell MD;  Location: [facility] ENDOSCOPY;  Service:    • COLONOSCOPY N/A 8/17/2021     Procedure: COLONOSCOPY;  Surgeon: Babar Connell MD;  Location: [facility] ENDOSCOPY;  Service: Gastroenterology;  Laterality: N/A;   • ENDOSCOPY N/A 2/3/2017    Procedure: ESOPHAGOGASTRODUODENOSCOPY;  Surgeon: Babar Connell MD;  Location: [facility] ENDOSCOPY;  Service:    • ENDOSCOPY N/A 5/22/2017    Procedure: ESOPHAGOGASTRODUODENOSCOPY;  Surgeon: Babar Connell MD;  Location: [facility] ENDOSCOPY;  Service:    • ENDOSCOPY N/A 9/22/2017    Procedure: ESOPHAGOGASTRODUODENOSCOPY;  Surgeon: Babar Connell MD;  Location: [facility] ENDOSCOPY;  Service:    • ENDOSCOPY N/A 12/18/2017    Procedure: ESOPHAGOGASTRODUODENOSCOPY;  Surgeon: Babar Connell MD;  Location: [facility] ENDOSCOPY;  Service:    • ENDOSCOPY N/A 6/14/2018    Procedure: ESOPHAGOGASTRODUODENOSCOPY possible dilation;  Surgeon: Babar Connell MD;  Location: [facility] ENDOSCOPY;  Service: Gastroenterology   • ENDOSCOPY N/A 12/14/2018    Procedure: ESOPHAGOGASTRODUODENOSCOPY;  Surgeon: Babar Connell MD;  Location: [facility] ENDOSCOPY;  Service: Gastroenterology   • ENDOSCOPY N/A 12/16/2019    Procedure: ESOPHAGOGASTRODUODENOSCOPY;  Surgeon: Babar Connell MD;  Location: [facility] ENDOSCOPY;  Service: Gastroenterology   • ENDOSCOPY N/A 6/3/2020    Procedure: ESOPHAGOGASTRODUODENOSCOPY;  Surgeon: Babar Connell MD;  Location: [facility] ENDOSCOPY;  Service: Gastroenterology;  Laterality: N/A;   • ENDOSCOPY N/A 2/9/2021    Procedure: ESOPHAGOGASTRODUODENOSCOPY;  Surgeon: Babar Connell MD;  Location: [facility] ENDOSCOPY;  Service: Gastroenterology;  Laterality: N/A;   • ESOPHAGUS SURGERY      Thoracic esophagectomy (Nikolai Michael type).Thoracic lymphadenectomy.Fiberoptic bronchoscopy.Placement of On Q pain pump.Jejunostomy tube placement.Pyloromyotomy. 03/01/2016      • LUMBAR DISC SURGERY     • UPPER GASTROINTESTINAL ENDOSCOPY  02/03/2017   • UPPER GASTROINTESTINAL ENDOSCOPY  05/22/2017   • UPPER GASTROINTESTINAL ENDOSCOPY  09/22/2017   • UPPER GASTROINTESTINAL ENDOSCOPY   12/18/2017   • UPPER GASTROINTESTINAL ENDOSCOPY  06/14/2018   • UPPER GASTROINTESTINAL ENDOSCOPY  12/14/2018   • UPPER GASTROINTESTINAL ENDOSCOPY  02/09/2021       Family History:  Family History   Problem Relation Age of Onset   • Ovarian cancer Mother    • Cancer Mother    • Osteoporosis Other    • Heart disease Other    • Diabetes Other    • Cancer Other         Colorectal-Parent   • Asthma Other    • Asthma Sister    • Diabetes Sister    • Hypertension Sister    • Alcohol abuse Brother    • Cancer Maternal Uncle        Social History:   reports that she quit smoking about 8 years ago. Her smoking use included cigarettes. She smoked an average of .5 packs per day. She has never used smokeless tobacco. She reports current alcohol use. She reports that she does not use drugs.    Medications:   Prior to Admission medications    Medication Sig Start Date End Date Taking? Authorizing Provider   albuterol (PROVENTIL HFA;VENTOLIN HFA) 108 (90 BASE) MCG/ACT inhaler Inhale 2 puffs Every 4 (Four) Hours As Needed for wheezing. 2/17/17  Yes Tiffany Jimenez MD   dexlansoprazole (Dexilant) 60 MG capsule Take 1 capsule by mouth Daily. 12/22/22  Yes Babar Connell MD   famotidine (PEPCID) 20 MG tablet Take 1 tablet by mouth 2 (Two) Times a Day. 12/22/22  Yes Babar Connell MD   ferrous sulfate (FeroSul) 325 (65 FE) MG tablet Take 1 tablet by mouth Daily With Breakfast. 7/21/22  Yes Camilo Coleman MD   folic acid (FOLVITE) 1 MG tablet Take 1 tablet by mouth on Monday, Wednesday and Friday 7/21/22  Yes Camilo Coleman MD   vitamin B-12 (CYANOCOBALAMIN) 1000 MCG tablet Take 1,000 mcg by mouth Daily.   Yes Zoey Cruz MD   dexlansoprazole (Dexilant) 60 MG capsule Take 1 capsule by mouth Daily. 5/11/22 12/22/22 Yes Babar Connell MD   famotidine (PEPCID) 20 MG tablet Take 1 tablet by mouth Daily. 11/30/22 12/22/22 Yes Babar Connell MD   famotidine (PEPCID) 20 MG tablet Take 1 tablet by mouth 2 (Two) Times a Day.  "12/22/22 12/22/22 Yes Babar Connell MD   docusate sodium (COLACE) 100 MG capsule Take 1 capsule by mouth 2 (Two) Times a Day As Needed for Constipation. 7/21/22   Camilo Coleman MD       Allergies:  Penicillins and Strawberry c [ascorbate]    ROS:    Review of Systems   Constitutional: Negative for activity change, appetite change, chills, diaphoresis, fatigue, fever and unexpected weight change.   HENT: Negative for sore throat and trouble swallowing.    Respiratory: Negative for shortness of breath.    Gastrointestinal: Negative for abdominal distention, abdominal pain, anal bleeding, blood in stool, constipation, diarrhea, nausea, rectal pain and vomiting.   Endocrine: Negative for polydipsia, polyphagia and polyuria.   Genitourinary: Negative for difficulty urinating.   Musculoskeletal: Negative for arthralgias.   Skin: Negative for pallor.   Allergic/Immunologic: Negative for food allergies.   Neurological: Negative for weakness and light-headedness.   Psychiatric/Behavioral: Negative for behavioral problems.     Objective     Blood pressure (!) 158/108, pulse 62, temperature 97.3 °F (36.3 °C), temperature source Temporal, resp. rate 20, height 160 cm (63\"), weight 59.8 kg (131 lb 12.8 oz), last menstrual period 03/17/1989, SpO2 98 %, not currently breastfeeding.    Physical Exam  Constitutional:       General: She is not in acute distress.     Appearance: She is well-developed. She is not diaphoretic.   HENT:      Head: Normocephalic and atraumatic.   Cardiovascular:      Rate and Rhythm: Normal rate and regular rhythm.      Heart sounds: Normal heart sounds. No murmur heard.    No friction rub. No gallop.   Pulmonary:      Effort: No respiratory distress.      Breath sounds: Normal breath sounds. No wheezing or rales.   Chest:      Chest wall: No tenderness.   Abdominal:      General: Bowel sounds are normal. There is no distension.      Palpations: Abdomen is soft. There is no mass.      Tenderness: There " is no abdominal tenderness. There is no guarding or rebound.      Hernia: No hernia is present.   Musculoskeletal:         General: Normal range of motion.   Skin:     General: Skin is warm and dry.      Coloration: Skin is not pale.      Findings: No erythema or rash.   Neurological:      Mental Status: She is alert and oriented to person, place, and time.   Psychiatric:         Behavior: Behavior normal.         Thought Content: Thought content normal.         Judgment: Judgment normal.          Assessment & Plan   Diagnoses and all orders for this visit:    1. Other dysphagia  -     dextrose 5 % and sodium chloride 0.45 % infusion    Other orders  -     Implement Anesthesia Orders Day of Procedure; Standing  -     Obtain Informed Consent; Standing  -     POC Glucose Once; Standing  -     Insert Peripheral IV; Standing  -     Implement Anesthesia Orders Day of Procedure  -     Obtain Informed Consent  -     POC Glucose Once  -     Insert Peripheral IV        ESOPHAGOGASTRODUODENOSCOPY (N/A)     Diagnosis Plan   1. Other dysphagia  dextrose 5 % and sodium chloride 0.45 % infusion          Anticipated Surgical Procedure:  Orders Placed This Encounter   Procedures   • Implement Anesthesia Orders Day of Procedure     Standing Status:   Standing     Number of Occurrences:   1   • Obtain Informed Consent     Standing Status:   Standing     Number of Occurrences:   1     Order Specific Question:   Informed Consent Given For     Answer:   egd   • POC Glucose Once     Prior to Procedure on ALL Diabetic Patients     Standing Status:   Standing     Number of Occurrences:   1   • Insert Peripheral IV     Standing Status:   Standing     Number of Occurrences:   1       The risks, benefits, and alternatives of this procedure have been discussed with the patient or the responsible party- the patient understands and agrees to proceed.

## 2023-02-01 NOTE — ANESTHESIA PREPROCEDURE EVALUATION
Anesthesia Evaluation     NPO Solid Status: > 8 hours  NPO Liquid Status: > 4 hours           Airway   Mallampati: II  Possible difficult intubation  Dental      Pulmonary     breath sounds clear to auscultation  (+) COPD, shortness of breath,   Cardiovascular     Rhythm: regular    (+) hypertension,       Neuro/Psych  (+) psychiatric history,    GI/Hepatic/Renal/Endo    (+)  GERD,      Musculoskeletal     Abdominal    Substance History      OB/GYN          Other                        Anesthesia Plan    ASA 3     general   total IV anesthesia  intravenous induction     Anesthetic plan, risks, benefits, and alternatives have been provided, discussed and informed consent has been obtained with: patient.        CODE STATUS:

## 2023-02-01 NOTE — ANESTHESIA POSTPROCEDURE EVALUATION
Patient: Maddison Sheridan    Procedure Summary     Date: 02/01/23 Room / Location: [facility] ENDOSCOPY 1 / [facility] ENDOSCOPY    Anesthesia Start: 0930 Anesthesia Stop: 1005    Procedure: ESOPHAGOGASTRODUODENOSCOPY Diagnosis:       Other dysphagia      (Other dysphagia [R13.19])    Surgeons: Babar Connell MD Provider: Tammy Infante CRNA    Anesthesia Type: general ASA Status: 3          Anesthesia Type: general    Vitals  No vitals data found for the desired time range.          Post Anesthesia Care and Evaluation    Patient location during evaluation: bedside  Patient participation: waiting for patient participation  Level of consciousness: responsive to light touch  Pain score: 0  Pain management: adequate    Airway patency: patent  Anesthetic complications: No anesthetic complications  PONV Status: none  Cardiovascular status: acceptable  Respiratory status: acceptable  Hydration status: acceptable  No anesthesia care post op

## 2023-05-11 ENCOUNTER — TELEPHONE (OUTPATIENT)
Dept: ONCOLOGY | Facility: CLINIC | Age: 63
End: 2023-05-11
Payer: MEDICARE

## 2023-05-11 NOTE — TELEPHONE ENCOUNTER
LEFT MESSAGE THAT HER APPT WITH DR MCCLURE FOR 5-25-23 HAD TO BE MOVED TO 5-26-23 APPT DATE AND TIME WAS LEFT ON HER VOICEMAIL

## 2023-05-25 ENCOUNTER — OFFICE VISIT (OUTPATIENT)
Dept: GASTROENTEROLOGY | Facility: CLINIC | Age: 63
End: 2023-05-25
Payer: MEDICARE

## 2023-05-25 VITALS
HEIGHT: 63 IN | SYSTOLIC BLOOD PRESSURE: 127 MMHG | HEART RATE: 63 BPM | WEIGHT: 133 LBS | DIASTOLIC BLOOD PRESSURE: 86 MMHG | BODY MASS INDEX: 23.57 KG/M2

## 2023-05-25 DIAGNOSIS — R13.19 OTHER DYSPHAGIA: Primary | ICD-10-CM

## 2023-05-25 RX ORDER — DEXLANSOPRAZOLE 60 MG/1
60 CAPSULE, DELAYED RELEASE ORAL DAILY
Qty: 90 CAPSULE | Refills: 5 | Status: CANCELLED | OUTPATIENT
Start: 2023-05-25

## 2023-05-25 RX ORDER — FAMOTIDINE 20 MG/1
20 TABLET, FILM COATED ORAL 2 TIMES DAILY
Qty: 180 TABLET | Refills: 5 | Status: CANCELLED | OUTPATIENT
Start: 2023-05-25

## 2023-05-25 RX ORDER — FAMOTIDINE 20 MG/1
20 TABLET, FILM COATED ORAL 2 TIMES DAILY
Qty: 180 TABLET | Refills: 5 | Status: SHIPPED | OUTPATIENT
Start: 2023-05-25

## 2023-05-25 RX ORDER — DOCUSATE SODIUM 100 MG/1
100 CAPSULE, LIQUID FILLED ORAL 2 TIMES DAILY PRN
Qty: 60 CAPSULE | Refills: 2 | Status: SHIPPED | OUTPATIENT
Start: 2023-05-25

## 2023-05-25 RX ORDER — DEXLANSOPRAZOLE 60 MG/1
60 CAPSULE, DELAYED RELEASE ORAL DAILY
Qty: 90 CAPSULE | Refills: 5 | Status: SHIPPED | OUTPATIENT
Start: 2023-05-25

## 2023-05-25 NOTE — PROGRESS NOTES
[facility]      Chief Complaint:   Chief Complaint   Patient presents with   • Difficulty Swallowing     Endo Follow Up       Subjective     HPI:   Patient with dysphagia.  Patient states that dysphagia markedly improved.  Patient had dilatation 2 months ago.  Patient is on a proton pump inhibitor and H2 blocker.  We will continue these medications.  We will have patient follow-up as needed for dysphagia.    Plan; we will have patient follow-up for dysphagia as needed otherwise we will have patient follow-up in 6 months    Past Medical History:   Past Medical History:   Diagnosis Date   • Abnormal weight loss    • Anxiety    • Atrophic vaginitis    • Depression    • Dysphagia     pharyngoesophageal phase      • Encounter for surgical aftercare following surgery of digestive system     [place] Michael (thoracic esophagectomy, lymphadenectomly, Bronchoscopy, Placement on Q. 3/1/16      • History of esophagogastroduodenoscopy     Normal hypopharynx.A tumor was found in the middle third of the esophagus.Mul.biopsies taken.Normal GE junction.Mild nonerosive gastritis in antrum.Biopsies taken.Normal pylorus and duodenum. 09/24/2015       • History of hysteroscopy     Hysteroscopy, dilation and curettage, and repair of vaginal laceration. 08/03/2013       • History of substance abuse    • Hypertension    • Lumbosacral strain    • Myopia    • Presbyopia    • Primary malignant neoplasm of thoracic part of esophagus     Squamous cell   • Tobacco dependence     1 ppd       Past Surgical History:    Past Surgical History:   Procedure Laterality Date   • ABDOMINAL SURGERY  03/01/2016    Abdominal portion of an [place] Michael esophagogastrectomy with jejunostomy tube placement. Esophageal carcinoma.   • COLONOSCOPY N/A 12/18/2017    Procedure: COLONOSCOPY;  Surgeon: Babar Connell MD;  Location: [facility] ENDOSCOPY;  Service:    • COLONOSCOPY N/A 8/17/2021    Procedure: COLONOSCOPY;  Surgeon:  Babar Connell MD;  Location: [facility] ENDOSCOPY;  Service: Gastroenterology;  Laterality: N/A;   • ENDOSCOPY N/A 2/3/2017    Procedure: ESOPHAGOGASTRODUODENOSCOPY;  Surgeon: Babar Connell MD;  Location: [facility] ENDOSCOPY;  Service:    • ENDOSCOPY N/A 5/22/2017    Procedure: ESOPHAGOGASTRODUODENOSCOPY;  Surgeon: Babar Connell MD;  Location: [facility] ENDOSCOPY;  Service:    • ENDOSCOPY N/A 9/22/2017    Procedure: ESOPHAGOGASTRODUODENOSCOPY;  Surgeon: Babar Connell MD;  Location: [facility] ENDOSCOPY;  Service:    • ENDOSCOPY N/A 12/18/2017    Procedure: ESOPHAGOGASTRODUODENOSCOPY;  Surgeon: Babar Connell MD;  Location: [facility] ENDOSCOPY;  Service:    • ENDOSCOPY N/A 6/14/2018    Procedure: ESOPHAGOGASTRODUODENOSCOPY possible dilation;  Surgeon: Babar Connell MD;  Location: [facility] ENDOSCOPY;  Service: Gastroenterology   • ENDOSCOPY N/A 12/14/2018    Procedure: ESOPHAGOGASTRODUODENOSCOPY;  Surgeon: Babar Connell MD;  Location: [facility] ENDOSCOPY;  Service: Gastroenterology   • ENDOSCOPY N/A 12/16/2019    Procedure: ESOPHAGOGASTRODUODENOSCOPY;  Surgeon: Babar Connell MD;  Location: [facility] ENDOSCOPY;  Service: Gastroenterology   • ENDOSCOPY N/A 6/3/2020    Procedure: ESOPHAGOGASTRODUODENOSCOPY;  Surgeon: Babar Connell MD;  Location: [facility] ENDOSCOPY;  Service: Gastroenterology;  Laterality: N/A;   • ENDOSCOPY N/A 2/9/2021    Procedure: ESOPHAGOGASTRODUODENOSCOPY;  Surgeon: Babar Connell MD;  Location: [facility] ENDOSCOPY;  Service: Gastroenterology;  Laterality: N/A;   • ENDOSCOPY N/A 2/1/2023    Procedure: ESOPHAGOGASTRODUODENOSCOPY;  Surgeon: Babar Connell MD;  Location: [facility] ENDOSCOPY;  Service: Gastroenterology;  Laterality: N/A;   • ESOPHAGUS SURGERY      Thoracic esophagectomy ([place] Michael type).Thoracic lymphadenectomy.Fiberoptic bronchoscopy.Placement of On Q pain pump.Jejunostomy tube placement.Pyloromyotomy. 03/01/2016      • LUMBAR DISC SURGERY     • UPPER GASTROINTESTINAL ENDOSCOPY  02/03/2017    • UPPER GASTROINTESTINAL ENDOSCOPY  05/22/2017   • UPPER GASTROINTESTINAL ENDOSCOPY  09/22/2017   • UPPER GASTROINTESTINAL ENDOSCOPY  12/18/2017   • UPPER GASTROINTESTINAL ENDOSCOPY  06/14/2018   • UPPER GASTROINTESTINAL ENDOSCOPY  12/14/2018   • UPPER GASTROINTESTINAL ENDOSCOPY  02/09/2021       Family History:  Family History   Problem Relation Age of Onset   • Ovarian cancer Mother    • Cancer Mother    • Osteoporosis Other    • Heart disease Other    • Diabetes Other    • Cancer Other         Colorectal-Parent   • Asthma Other    • Asthma Sister    • Diabetes Sister    • Hypertension Sister    • Alcohol abuse Brother    • Cancer Maternal Uncle        Social History:   reports that she quit smoking about 8 years ago. Her smoking use included cigarettes. She smoked an average of .5 packs per day. She has never used smokeless tobacco. She reports current alcohol use. She reports that she does not use drugs.    Medications:   Prior to Admission medications    Medication Sig Start Date End Date Taking? Authorizing Provider   albuterol (PROVENTIL HFA;VENTOLIN HFA) 108 (90 BASE) MCG/ACT inhaler Inhale 2 puffs Every 4 (Four) Hours As Needed for wheezing. 2/17/17  Yes Tiffany Jimenez MD   dexlansoprazole (Dexilant) 60 MG capsule Take 1 capsule by mouth Daily. 5/25/23  Yes Babar Connell MD   docusate sodium (COLACE) 100 MG capsule Take 1 capsule by mouth 2 (Two) Times a Day As Needed for Constipation. 5/25/23  Yes Babar Connell MD   famotidine (PEPCID) 20 MG tablet Take 1 tablet by mouth 2 (Two) Times a Day. 5/25/23  Yes Babar Connell MD   ferrous sulfate (FeroSul) 325 (65 FE) MG tablet Take 1 tablet by mouth Daily With Breakfast. 7/21/22  Yes Camilo Coleman MD   folic acid (FOLVITE) 1 MG tablet Take 1 tablet by mouth on Monday, Wednesday and Friday 7/21/22  Yes Camilo Coleman MD   vitamin B-12 (CYANOCOBALAMIN) 1000 MCG tablet Take 1 tablet by mouth Daily.   Yes Zoey Cruz MD   dexlansoprazole  "(Dexilant) 60 MG capsule Take 1 capsule by mouth Daily. 12/22/22 5/25/23 Yes Babar Connell MD   docusate sodium (COLACE) 100 MG capsule Take 1 capsule by mouth 2 (Two) Times a Day As Needed for Constipation. 7/21/22 5/25/23 Yes Camilo Coleman MD   famotidine (PEPCID) 20 MG tablet Take 1 tablet by mouth 2 (Two) Times a Day. 12/22/22 5/25/23 Yes Babar Connell MD       Allergies:  Penicillins and Strawberry c [ascorbate]    ROS:    Review of Systems   Constitutional: Negative for activity change, appetite change, chills, diaphoresis, fatigue, fever and unexpected weight change.   HENT: Negative for sore throat and trouble swallowing.    Respiratory: Negative for shortness of breath.    Gastrointestinal: Negative for abdominal distention, abdominal pain, anal bleeding, blood in stool, constipation, diarrhea, nausea, rectal pain and vomiting.   Endocrine: Negative for polydipsia, polyphagia and polyuria.   Genitourinary: Negative for difficulty urinating.   Musculoskeletal: Negative for arthralgias.   Skin: Negative for pallor.   Allergic/Immunologic: Negative for food allergies.   Neurological: Negative for weakness and light-headedness.   Psychiatric/Behavioral: Negative for behavioral problems.     Objective     Blood pressure 127/86, pulse 63, height 160 cm (63\"), weight 60.3 kg (133 lb), last menstrual period 03/17/1989, not currently breastfeeding.    Physical Exam  Constitutional:       General: She is not in acute distress.     Appearance: She is well-developed. She is not diaphoretic.   HENT:      Head: Normocephalic and atraumatic.   Cardiovascular:      Rate and Rhythm: Normal rate and regular rhythm.      Heart sounds: Normal heart sounds. No murmur heard.    No friction rub. No gallop.   Pulmonary:      Effort: No respiratory distress.      Breath sounds: Normal breath sounds. No wheezing or rales.   Chest:      Chest wall: No tenderness.   Abdominal:      General: Bowel sounds are normal. There is no " distension.      Palpations: Abdomen is soft. There is no mass.      Tenderness: There is no abdominal tenderness. There is no guarding or rebound.      Hernia: No hernia is present.   Musculoskeletal:         General: Normal range of motion.   Skin:     General: Skin is warm and dry.      Coloration: Skin is not pale.      Findings: No erythema or rash.   Neurological:      Mental Status: She is alert and oriented to person, place, and time.   Psychiatric:         Behavior: Behavior normal.         Thought Content: Thought content normal.         Judgment: Judgment normal.          Assessment & Plan   Diagnoses and all orders for this visit:    1. Other dysphagia (Primary)    Other orders  -     dexlansoprazole (Dexilant) 60 MG capsule; Take 1 capsule by mouth Daily.  Dispense: 90 capsule; Refill: 5  -     famotidine (PEPCID) 20 MG tablet; Take 1 tablet by mouth 2 (Two) Times a Day.  Dispense: 180 tablet; Refill: 5  -     docusate sodium (COLACE) 100 MG capsule; Take 1 capsule by mouth 2 (Two) Times a Day As Needed for Constipation.  Dispense: 60 capsule; Refill: 2        * Surgery not found *     Diagnosis Plan   1. Other dysphagia            Anticipated Surgical Procedure:  No orders of the defined types were placed in this encounter.      The risks, benefits, and alternatives of this procedure have been discussed with the patient or the responsible party- the patient understands and agrees to proceed.

## 2023-08-21 ENCOUNTER — LAB (OUTPATIENT)
Dept: ONCOLOGY | Facility: HOSPITAL | Age: 63
End: 2023-08-21
Payer: MEDICARE

## 2023-08-21 ENCOUNTER — OFFICE VISIT (OUTPATIENT)
Dept: ONCOLOGY | Facility: CLINIC | Age: 63
End: 2023-08-21
Payer: MEDICARE

## 2023-08-21 VITALS
HEART RATE: 57 BPM | OXYGEN SATURATION: 99 % | BODY MASS INDEX: 22.67 KG/M2 | DIASTOLIC BLOOD PRESSURE: 93 MMHG | WEIGHT: 128 LBS | RESPIRATION RATE: 18 BRPM | SYSTOLIC BLOOD PRESSURE: 173 MMHG

## 2023-08-21 DIAGNOSIS — C15.4 MALIGNANT NEOPLASM OF THORACIC ESOPHAGUS: Primary | Chronic | ICD-10-CM

## 2023-08-21 DIAGNOSIS — C15.4 MALIGNANT NEOPLASM OF THORACIC ESOPHAGUS: ICD-10-CM

## 2023-08-21 DIAGNOSIS — E61.1 IRON DEFICIENCY: ICD-10-CM

## 2023-08-21 LAB
ALBUMIN SERPL-MCNC: 3.8 G/DL (ref 3.5–5.2)
ALBUMIN/GLOB SERPL: 1.1 G/DL
ALP SERPL-CCNC: 99 U/L (ref 39–117)
ALT SERPL W P-5'-P-CCNC: 9 U/L (ref 1–33)
ANION GAP SERPL CALCULATED.3IONS-SCNC: 9 MMOL/L (ref 5–15)
AST SERPL-CCNC: 13 U/L (ref 1–32)
BASOPHILS # BLD AUTO: 0.05 10*3/MM3 (ref 0–0.2)
BASOPHILS NFR BLD AUTO: 0.9 % (ref 0–1.5)
BILIRUB SERPL-MCNC: 0.7 MG/DL (ref 0–1.2)
BUN SERPL-MCNC: 13 MG/DL (ref 8–23)
BUN/CREAT SERPL: 15.5 (ref 7–25)
CALCIUM SPEC-SCNC: 9 MG/DL (ref 8.6–10.5)
CHLORIDE SERPL-SCNC: 104 MMOL/L (ref 98–107)
CO2 SERPL-SCNC: 28 MMOL/L (ref 22–29)
CREAT SERPL-MCNC: 0.84 MG/DL (ref 0.57–1)
DEPRECATED RDW RBC AUTO: 45.3 FL (ref 37–54)
EGFRCR SERPLBLD CKD-EPI 2021: 77.2 ML/MIN/1.73
EOSINOPHIL # BLD AUTO: 0.07 10*3/MM3 (ref 0–0.4)
EOSINOPHIL NFR BLD AUTO: 1.2 % (ref 0.3–6.2)
ERYTHROCYTE [DISTWIDTH] IN BLOOD BY AUTOMATED COUNT: 12.7 % (ref 12.3–15.4)
FERRITIN SERPL-MCNC: 95.24 NG/ML (ref 13–150)
FOLATE SERPL-MCNC: >20 NG/ML (ref 4.78–24.2)
GLOBULIN UR ELPH-MCNC: 3.4 GM/DL
GLUCOSE SERPL-MCNC: 102 MG/DL (ref 65–99)
HCT VFR BLD AUTO: 42.8 % (ref 34–46.6)
HGB BLD-MCNC: 14.1 G/DL (ref 12–15.9)
IMM GRANULOCYTES # BLD AUTO: 0.01 10*3/MM3 (ref 0–0.05)
IMM GRANULOCYTES NFR BLD AUTO: 0.2 % (ref 0–0.5)
IRON 24H UR-MRATE: 86 MCG/DL (ref 37–145)
IRON SATN MFR SERPL: 24 % (ref 20–50)
LYMPHOCYTES # BLD AUTO: 1.23 10*3/MM3 (ref 0.7–3.1)
LYMPHOCYTES NFR BLD AUTO: 21.1 % (ref 19.6–45.3)
MCH RBC QN AUTO: 31.8 PG (ref 26.6–33)
MCHC RBC AUTO-ENTMCNC: 32.9 G/DL (ref 31.5–35.7)
MCV RBC AUTO: 96.4 FL (ref 79–97)
MONOCYTES # BLD AUTO: 0.55 10*3/MM3 (ref 0.1–0.9)
MONOCYTES NFR BLD AUTO: 9.4 % (ref 5–12)
NEUTROPHILS NFR BLD AUTO: 3.92 10*3/MM3 (ref 1.7–7)
NEUTROPHILS NFR BLD AUTO: 67.2 % (ref 42.7–76)
NRBC BLD AUTO-RTO: 0 /100 WBC (ref 0–0.2)
PLATELET # BLD AUTO: 215 10*3/MM3 (ref 140–450)
PMV BLD AUTO: 12 FL (ref 6–12)
POTASSIUM SERPL-SCNC: 3.7 MMOL/L (ref 3.5–5.2)
PROT SERPL-MCNC: 7.2 G/DL (ref 6–8.5)
RBC # BLD AUTO: 4.44 10*6/MM3 (ref 3.77–5.28)
SODIUM SERPL-SCNC: 141 MMOL/L (ref 136–145)
TIBC SERPL-MCNC: 362 MCG/DL (ref 298–536)
TRANSFERRIN SERPL-MCNC: 243 MG/DL (ref 200–360)
VIT B12 BLD-MCNC: 1743 PG/ML (ref 211–946)
WBC NRBC COR # BLD: 5.83 10*3/MM3 (ref 3.4–10.8)

## 2023-08-21 PROCEDURE — 82746 ASSAY OF FOLIC ACID SERUM: CPT

## 2023-08-21 PROCEDURE — 80053 COMPREHEN METABOLIC PANEL: CPT

## 2023-08-21 PROCEDURE — 85025 COMPLETE CBC W/AUTO DIFF WBC: CPT

## 2023-08-21 PROCEDURE — 83540 ASSAY OF IRON: CPT

## 2023-08-21 PROCEDURE — 82607 VITAMIN B-12: CPT

## 2023-08-21 PROCEDURE — 82728 ASSAY OF FERRITIN: CPT

## 2023-08-21 PROCEDURE — 84466 ASSAY OF TRANSFERRIN: CPT

## 2023-08-21 PROCEDURE — G0463 HOSPITAL OUTPT CLINIC VISIT: HCPCS | Performed by: NURSE PRACTITIONER

## 2023-08-25 NOTE — PROGRESS NOTES
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   She denies any bleeding; denies any new lymph node enlargement. Denies any new dysphagia.       Past Medical History, Past Surgical History, Social History, Family History have been reviewed and are without significant changes except as mentioned.    Review of Systems   A comprehensive 14 point review of systems was performed and was negative except as mentioned.    Medications:  The current medication list was reviewed in the EMR    ALLERGIES:    Allergies   Allergen Reactions    Penicillins Hives    Strawberry C [Ascorbate] Hives       Objective      Vitals:    08/21/23 0823   BP: 173/93   Pulse: 57   Resp: 18   SpO2: 99%   Weight: 58.1 kg (128 lb)   PainSc: 0-No pain         4/1/2021    10:21 AM   Current Status   ECOG score 0       Physical Exam  General: alert and oriented no acute distress  Lungs;normal breath sounds  Card:RRR  Ext; no edema    RECENT LABS:  Glucose   Date Value Ref Range Status   08/21/2023 102 (H) 65 - 99 mg/dL Final     Sodium   Date Value Ref Range Status   08/21/2023 141 136 - 145 mmol/L Final     Potassium   Date Value Ref Range Status   08/21/2023 3.7 3.5 - 5.2 mmol/L Final     CO2   Date Value Ref Range Status   08/21/2023 28.0 22.0 - 29.0 mmol/L Final     Chloride   Date Value Ref Range Status   08/21/2023 104 98 - 107 mmol/L Final     Anion Gap   Date Value Ref Range Status   08/21/2023 9.0 5.0 - 15.0 mmol/L Final     Creatinine   Date Value Ref Range Status   08/21/2023 0.84 0.57 - 1.00 mg/dL Final     BUN   Date Value Ref Range Status   08/21/2023 13 8 - 23 mg/dL Final     BUN/Creatinine Ratio   Date Value  Ref Range Status   08/21/2023 15.5 7.0 - 25.0 Final     Calcium   Date Value Ref Range Status   08/21/2023 9.0 8.6 - 10.5 mg/dL Final     Alkaline Phosphatase   Date Value Ref Range Status   08/21/2023 99 39 - 117 U/L Final     Total Protein   Date Value Ref Range Status   08/21/2023 7.2 6.0 - 8.5 g/dL Final     ALT (SGPT)   Date Value Ref Range Status   08/21/2023 9 1 - 33 U/L Final     AST (SGOT)   Date Value Ref Range Status   08/21/2023 13 1 - 32 U/L Final     Total Bilirubin   Date Value Ref Range Status   08/21/2023 0.7 0.0 - 1.2 mg/dL Final     Albumin   Date Value Ref Range Status   08/21/2023 3.8 3.5 - 5.2 g/dL Final     Globulin   Date Value Ref Range Status   08/21/2023 3.4 gm/dL Final     Lab Results   Component Value Date    WBC 5.83 08/21/2023    HGB 14.1 08/21/2023    HCT 42.8 08/21/2023    MCV 96.4 08/21/2023     08/21/2023     Lab Results   Component Value Date    NEUTROABS 3.92 08/21/2023    IRON 86 08/21/2023    IRON 87 12/08/2022    IRON 96 07/21/2022    TIBC 362 08/21/2023    TIBC 375 12/08/2022    TIBC 378 07/21/2022    LABIRON 24 08/21/2023    LABIRON 23 12/08/2022    LABIRON 25 07/21/2022    FERRITIN 95.24 08/21/2023    FERRITIN 149.50 12/08/2022    FERRITIN 112.90 07/21/2022    MRIOKNRY92 1,743 (H) 08/21/2023    QNTNYRSD56 1,474 (H) 12/08/2022    ZCBWOPRJ15 1,462 (H) 07/21/2022    FOLATE >20.00 08/21/2023    FOLATE 17.80 12/08/2022    FOLATE 17.90 07/21/2022     No results found for: , LABCA2, AFPTM, HCGQUANT, , CHROMGRNA, [id number], CEA, REFLABREPO      RADIOLOGY DATA :  No radiology results for the last 7 days        Assessment & Plan       1.  Squamous cell cancer of mid esophagus  - Patient was diagnosed in September 2015  - Received concurrent chemoradiation with weekly carboplatin and Taxol that completed in January 2016  - Had esophagectomy done in March 1, 2016 that showed complete pathologic response.  Has been on surveillance since then  - EGD in February 2021 was  negative for recurrence  - Since patient is more than 5 years out from diagnosis, no need for any routine CT scan which has been discussed with patient today  - We will have patient return to clinic in 4 months with repeat CBC, CMP, iron studies, ferritin, B12 and folate to be done on that day     2.  Iron deficiency:  - Patient was found to have iron deficiency in December 2020  - Currently on ferrous sulfate p.o. daily with folic acid 3 times a week  - labs today reviewed; Hgb is 14.1 with table iron studies. Recommend she continue with iron tablet daily and folic acid TIW.      3.  Health maintenance: Patient does not smoke     4. Advance Care Planning: For now patient remains full code and is able to make decisions.  Patient has health care surrogate mentioned on chart.             PHQ-9 Total Score: 0 pt is not suicidal or homicidal    Maddisonjulia Sheridan reports a pain score of 0.  Given her pain assessment as noted, treatment options were discussed and the following options were decided upon as a follow-up plan to address the patient's pain:  no pain today .         Milagros Velasquez, APRN  8/25/2023  10:49 CDT        Part of this note may be an electronic transcription/translation of spoken language to printed text using the Dragon Dictation System.          CC:

## (undated) DEVICE — BITEBLOCK ENDO W/STRAP 60F A/ LF DISP

## (undated) DEVICE — CANN SMPL SOFTECH BIFLO ETCO2 A/M 7FT

## (undated) DEVICE — SINGLE-USE BIOPSY FORCEPS: Brand: RADIAL JAW 4

## (undated) DEVICE — CLEANGUIDE DISPOSABLE MARKED SPRING TIP GUIDEWIRE, 1.86 MM X 210 CM: Brand: CLEANGUIDE

## (undated) DEVICE — ESOPHAGEAL BALLOON DILATATION CATHETER: Brand: CRE FIXED WIRE

## (undated) DEVICE — Device: Brand: DISPOSABLE ELECTROSURGICAL SNARE

## (undated) DEVICE — MSK ENDO PORT O2 POM ELITE CURAPLEX A/

## (undated) DEVICE — TRAP SXN POLYP QUICKCATCH LF

## (undated) DEVICE — THE DISPOSABLE ROTH NET FOREIGN BODY STANDARD RETRIEVAL DEVICE IS USED IN THE ENDOSCOPIC RETRIEVAL OF FOREIGN BODY, FOOD BOLUS AND EXCISED TISSUE SUCH AS POLYPS.: Brand: ROTH NET

## (undated) DEVICE — PATIENT RETURN ELECTRODE, SINGLE-USE, CONTACT QUALITY MONITORING, ADULT, WITH 9FT CORD, FOR PATIENTS WEIGING OVER 33LBS. (15KG): Brand: MEGADYNE

## (undated) DEVICE — DEV INFL BALN BIG60 W/GAUGE 60ML